# Patient Record
Sex: FEMALE | Race: WHITE | Employment: OTHER | URBAN - METROPOLITAN AREA
[De-identification: names, ages, dates, MRNs, and addresses within clinical notes are randomized per-mention and may not be internally consistent; named-entity substitution may affect disease eponyms.]

---

## 2017-01-01 ENCOUNTER — SNF VISIT (OUTPATIENT)
Dept: INTERNAL MEDICINE CLINIC | Facility: SKILLED NURSING FACILITY | Age: 82
End: 2017-01-01

## 2017-01-01 ENCOUNTER — HOSPITAL ENCOUNTER (OUTPATIENT)
Facility: HOSPITAL | Age: 82
Setting detail: OBSERVATION
Discharge: NURSING, REHAB OR LONG-TERM CARE | End: 2017-01-01
Attending: EMERGENCY MEDICINE | Admitting: INTERNAL MEDICINE
Payer: MEDICARE

## 2017-01-01 ENCOUNTER — APPOINTMENT (OUTPATIENT)
Dept: GENERAL RADIOLOGY | Facility: HOSPITAL | Age: 82
End: 2017-01-01
Attending: EMERGENCY MEDICINE
Payer: MEDICARE

## 2017-01-01 ENCOUNTER — OFFICE VISIT (OUTPATIENT)
Dept: CARDIOLOGY CLINIC | Facility: HOSPITAL | Age: 82
End: 2017-01-01
Attending: INTERNAL MEDICINE
Payer: MEDICARE

## 2017-01-01 ENCOUNTER — HOSPITAL ENCOUNTER (OUTPATIENT)
Facility: HOSPITAL | Age: 82
Setting detail: OBSERVATION
Discharge: HOME HEALTH | End: 2017-01-01
Attending: EMERGENCY MEDICINE | Admitting: INTERNAL MEDICINE
Payer: MEDICARE

## 2017-01-01 ENCOUNTER — HOSPITAL ENCOUNTER (EMERGENCY)
Facility: HOSPITAL | Age: 82
Discharge: HOME | End: 2017-01-01
Attending: EMERGENCY MEDICINE
Payer: MEDICARE

## 2017-01-01 ENCOUNTER — APPOINTMENT (OUTPATIENT)
Dept: GENERAL RADIOLOGY | Facility: HOSPITAL | Age: 82
End: 2017-01-01
Attending: ORTHOPAEDIC SURGERY
Payer: MEDICARE

## 2017-01-01 ENCOUNTER — APPOINTMENT (OUTPATIENT)
Dept: CT IMAGING | Facility: HOSPITAL | Age: 82
End: 2017-01-01
Attending: EMERGENCY MEDICINE
Payer: MEDICARE

## 2017-01-01 ENCOUNTER — LAB REQUISITION (OUTPATIENT)
Dept: LAB | Facility: HOSPITAL | Age: 82
End: 2017-01-01
Payer: MEDICARE

## 2017-01-01 VITALS
HEART RATE: 86 BPM | BODY MASS INDEX: 33 KG/M2 | DIASTOLIC BLOOD PRESSURE: 62 MMHG | SYSTOLIC BLOOD PRESSURE: 130 MMHG | OXYGEN SATURATION: 90 % | WEIGHT: 169 LBS

## 2017-01-01 VITALS
TEMPERATURE: 97 F | DIASTOLIC BLOOD PRESSURE: 80 MMHG | HEART RATE: 78 BPM | WEIGHT: 164 LBS | RESPIRATION RATE: 20 BRPM | OXYGEN SATURATION: 91 % | SYSTOLIC BLOOD PRESSURE: 119 MMHG | BODY MASS INDEX: 32 KG/M2

## 2017-01-01 VITALS
DIASTOLIC BLOOD PRESSURE: 77 MMHG | TEMPERATURE: 97 F | WEIGHT: 175 LBS | OXYGEN SATURATION: 94 % | BODY MASS INDEX: 34 KG/M2 | SYSTOLIC BLOOD PRESSURE: 155 MMHG | HEART RATE: 85 BPM | RESPIRATION RATE: 19 BRPM

## 2017-01-01 VITALS
HEIGHT: 60 IN | OXYGEN SATURATION: 93 % | RESPIRATION RATE: 20 BRPM | DIASTOLIC BLOOD PRESSURE: 62 MMHG | HEART RATE: 90 BPM | TEMPERATURE: 98 F | BODY MASS INDEX: 32 KG/M2 | WEIGHT: 163 LBS | SYSTOLIC BLOOD PRESSURE: 155 MMHG

## 2017-01-01 VITALS
DIASTOLIC BLOOD PRESSURE: 42 MMHG | SYSTOLIC BLOOD PRESSURE: 122 MMHG | HEART RATE: 68 BPM | BODY MASS INDEX: 34 KG/M2 | WEIGHT: 175 LBS | OXYGEN SATURATION: 90 %

## 2017-01-01 VITALS
WEIGHT: 173 LBS | SYSTOLIC BLOOD PRESSURE: 141 MMHG | BODY MASS INDEX: 34 KG/M2 | HEART RATE: 78 BPM | DIASTOLIC BLOOD PRESSURE: 66 MMHG | OXYGEN SATURATION: 89 %

## 2017-01-01 VITALS
SYSTOLIC BLOOD PRESSURE: 145 MMHG | OXYGEN SATURATION: 94 % | HEART RATE: 70 BPM | BODY MASS INDEX: 33 KG/M2 | WEIGHT: 170.5 LBS | DIASTOLIC BLOOD PRESSURE: 73 MMHG

## 2017-01-01 VITALS
WEIGHT: 172.31 LBS | RESPIRATION RATE: 18 BRPM | DIASTOLIC BLOOD PRESSURE: 71 MMHG | OXYGEN SATURATION: 91 % | SYSTOLIC BLOOD PRESSURE: 133 MMHG | BODY MASS INDEX: 34 KG/M2 | HEART RATE: 77 BPM

## 2017-01-01 VITALS
HEART RATE: 76 BPM | DIASTOLIC BLOOD PRESSURE: 72 MMHG | BODY MASS INDEX: 34 KG/M2 | WEIGHT: 176 LBS | OXYGEN SATURATION: 91 % | SYSTOLIC BLOOD PRESSURE: 149 MMHG

## 2017-01-01 VITALS
RESPIRATION RATE: 20 BRPM | OXYGEN SATURATION: 95 % | HEART RATE: 83 BPM | SYSTOLIC BLOOD PRESSURE: 124 MMHG | HEIGHT: 60 IN | TEMPERATURE: 98 F | DIASTOLIC BLOOD PRESSURE: 50 MMHG | BODY MASS INDEX: 31.74 KG/M2 | WEIGHT: 161.69 LBS

## 2017-01-01 VITALS
BODY MASS INDEX: 34 KG/M2 | DIASTOLIC BLOOD PRESSURE: 60 MMHG | SYSTOLIC BLOOD PRESSURE: 154 MMHG | OXYGEN SATURATION: 91 % | WEIGHT: 176 LBS | HEART RATE: 79 BPM

## 2017-01-01 DIAGNOSIS — I50.9 CHF (CONGESTIVE HEART FAILURE) (HCC): ICD-10-CM

## 2017-01-01 DIAGNOSIS — I50.33 ACUTE ON CHRONIC DIASTOLIC CONGESTIVE HEART FAILURE (HCC): Primary | Chronic | ICD-10-CM

## 2017-01-01 DIAGNOSIS — E87.70 HYPERVOLEMIA, UNSPECIFIED HYPERVOLEMIA TYPE: Primary | ICD-10-CM

## 2017-01-01 DIAGNOSIS — I50.9 HEART FAILURE (HCC): ICD-10-CM

## 2017-01-01 DIAGNOSIS — I25.10 ATHEROSCLEROTIC HEART DISEASE OF NATIVE CORONARY ARTERY WITHOUT ANGINA PECTORIS: ICD-10-CM

## 2017-01-01 DIAGNOSIS — I48.19 ATRIAL FIBRILLATION, PERSISTENT (HCC): ICD-10-CM

## 2017-01-01 DIAGNOSIS — R60.9 PERIPHERAL EDEMA: ICD-10-CM

## 2017-01-01 DIAGNOSIS — M43.6 TORTICOLLIS: ICD-10-CM

## 2017-01-01 DIAGNOSIS — M43.6 TORTICOLLIS: Primary | ICD-10-CM

## 2017-01-01 DIAGNOSIS — I50.9 HEART FAILURE, UNSPECIFIED (HCC): ICD-10-CM

## 2017-01-01 DIAGNOSIS — Z91.81 RISK FOR FALLS: Primary | ICD-10-CM

## 2017-01-01 DIAGNOSIS — I50.32 CHRONIC DIASTOLIC CHF (CONGESTIVE HEART FAILURE) (HCC): Primary | ICD-10-CM

## 2017-01-01 DIAGNOSIS — I50.33 ACUTE ON CHRONIC DIASTOLIC CONGESTIVE HEART FAILURE (HCC): Chronic | ICD-10-CM

## 2017-01-01 DIAGNOSIS — D64.9 ANEMIA, UNSPECIFIED TYPE: ICD-10-CM

## 2017-01-01 DIAGNOSIS — T07.XXXA MULTIPLE CONTUSIONS: ICD-10-CM
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
## 2017-01-01 DIAGNOSIS — N30.00 ACUTE CYSTITIS WITHOUT HEMATURIA: Primary | ICD-10-CM

## 2017-01-01 LAB
ANION GAP SERPL CALC-SCNC: 10 MMOL/L (ref 0–18)
ANION GAP SERPL CALC-SCNC: 9 MMOL/L (ref 0–18)
ANION GAP SERPL CALC-SCNC: 9 MMOL/L (ref 0–18)
BASOPHILS # BLD: 0 K/UL (ref 0–0.2)
BASOPHILS # BLD: 0.1 K/UL (ref 0–0.2)
BASOPHILS NFR BLD: 0 %
BASOPHILS NFR BLD: 1 %
BNP SERPL-MCNC: 181 PG/ML (ref 0–100)
BUN SERPL-MCNC: 14 MG/DL (ref 8–20)
BUN SERPL-MCNC: 18 MG/DL (ref 8–20)
BUN SERPL-MCNC: 18 MG/DL (ref 8–20)
BUN/CREAT SERPL: 16.3 (ref 10–20)
BUN/CREAT SERPL: 17.1 (ref 10–20)
BUN/CREAT SERPL: 22.2 (ref 10–20)
CALCIUM SERPL-MCNC: 8.6 MG/DL (ref 8.5–10.5)
CALCIUM SERPL-MCNC: 9 MG/DL (ref 8.5–10.5)
CALCIUM SERPL-MCNC: 9.1 MG/DL (ref 8.5–10.5)
CHLORIDE SERPL-SCNC: 92 MMOL/L (ref 95–110)
CHLORIDE SERPL-SCNC: 94 MMOL/L (ref 95–110)
CHLORIDE SERPL-SCNC: 99 MMOL/L (ref 95–110)
CO2 SERPL-SCNC: 31 MMOL/L (ref 22–32)
CO2 SERPL-SCNC: 33 MMOL/L (ref 22–32)
CO2 SERPL-SCNC: 35 MMOL/L (ref 22–32)
CREAT SERPL-MCNC: 0.81 MG/DL (ref 0.5–1.5)
CREAT SERPL-MCNC: 0.86 MG/DL (ref 0.5–1.5)
CREAT SERPL-MCNC: 1.05 MG/DL (ref 0.5–1.5)
EOSINOPHIL # BLD: 0.1 K/UL (ref 0–0.7)
EOSINOPHIL # BLD: 0.2 K/UL (ref 0–0.7)
EOSINOPHIL NFR BLD: 1 %
EOSINOPHIL NFR BLD: 2 %
ERYTHROCYTE [DISTWIDTH] IN BLOOD BY AUTOMATED COUNT: 13.6 % (ref 11–15)
ERYTHROCYTE [DISTWIDTH] IN BLOOD BY AUTOMATED COUNT: 13.9 % (ref 11–15)
GLUCOSE SERPL-MCNC: 102 MG/DL (ref 70–99)
GLUCOSE SERPL-MCNC: 97 MG/DL (ref 70–99)
GLUCOSE SERPL-MCNC: 98 MG/DL (ref 70–99)
HCT VFR BLD AUTO: 39.1 % (ref 35–48)
HCT VFR BLD AUTO: 41.3 % (ref 35–48)
HGB BLD-MCNC: 13 G/DL (ref 12–16)
HGB BLD-MCNC: 13.8 G/DL (ref 12–16)
LYMPHOCYTES # BLD: 1.6 K/UL (ref 1–4)
LYMPHOCYTES # BLD: 1.6 K/UL (ref 1–4)
LYMPHOCYTES NFR BLD: 13 %
LYMPHOCYTES NFR BLD: 17 %
MAGNESIUM SERPL-MCNC: 2 MG/DL (ref 1.8–2.5)
MCH RBC QN AUTO: 31 PG (ref 27–32)
MCH RBC QN AUTO: 31.3 PG (ref 27–32)
MCHC RBC AUTO-ENTMCNC: 33.4 G/DL (ref 32–37)
MCHC RBC AUTO-ENTMCNC: 33.5 G/DL (ref 32–37)
MCV RBC AUTO: 92.4 FL (ref 80–100)
MCV RBC AUTO: 93.8 FL (ref 80–100)
MONOCYTES # BLD: 1.1 K/UL (ref 0–1)
MONOCYTES # BLD: 1.2 K/UL (ref 0–1)
MONOCYTES NFR BLD: 10 %
MONOCYTES NFR BLD: 12 %
NEUTROPHILS # BLD AUTO: 6.3 K/UL (ref 1.8–7.7)
NEUTROPHILS # BLD AUTO: 9.1 K/UL (ref 1.8–7.7)
NEUTROPHILS NFR BLD: 69 %
NEUTROPHILS NFR BLD: 76 %
OSMOLALITY UR CALC.SUM OF ELEC: 282 MOSM/KG (ref 275–295)
OSMOLALITY UR CALC.SUM OF ELEC: 288 MOSM/KG (ref 275–295)
OSMOLALITY UR CALC.SUM OF ELEC: 288 MOSM/KG (ref 275–295)
PLATELET # BLD AUTO: 191 K/UL (ref 140–400)
PLATELET # BLD AUTO: 206 K/UL (ref 140–400)
PMV BLD AUTO: 7.1 FL (ref 7.4–10.3)
PMV BLD AUTO: 7.3 FL (ref 7.4–10.3)
POTASSIUM SERPL-SCNC: 3.7 MMOL/L (ref 3.3–5.1)
POTASSIUM SERPL-SCNC: 3.7 MMOL/L (ref 3.3–5.1)
POTASSIUM SERPL-SCNC: 3.8 MMOL/L (ref 3.3–5.1)
POTASSIUM SERPL-SCNC: 3.9 MMOL/L (ref 3.3–5.1)
RBC # BLD AUTO: 4.17 M/UL (ref 3.7–5.4)
RBC # BLD AUTO: 4.47 M/UL (ref 3.7–5.4)
SODIUM SERPL-SCNC: 135 MMOL/L (ref 136–144)
SODIUM SERPL-SCNC: 138 MMOL/L (ref 136–144)
SODIUM SERPL-SCNC: 139 MMOL/L (ref 136–144)
TROPONIN I SERPL-MCNC: 0.03 NG/ML (ref ?–0.03)
WBC # BLD AUTO: 12.1 K/UL (ref 4–11)
WBC # BLD AUTO: 9.2 K/UL (ref 4–11)

## 2017-01-01 PROCEDURE — 80048 BASIC METABOLIC PNL TOTAL CA: CPT | Performed by: EMERGENCY MEDICINE

## 2017-01-01 PROCEDURE — 93005 ELECTROCARDIOGRAM TRACING: CPT

## 2017-01-01 PROCEDURE — 80048 BASIC METABOLIC PNL TOTAL CA: CPT | Performed by: NURSE PRACTITIONER

## 2017-01-01 PROCEDURE — 85025 COMPLETE CBC W/AUTO DIFF WBC: CPT | Performed by: EMERGENCY MEDICINE

## 2017-01-01 PROCEDURE — 72100 X-RAY EXAM L-S SPINE 2/3 VWS: CPT | Performed by: EMERGENCY MEDICINE

## 2017-01-01 PROCEDURE — 80061 LIPID PANEL: CPT | Performed by: INTERNAL MEDICINE

## 2017-01-01 PROCEDURE — 99285 EMERGENCY DEPT VISIT HI MDM: CPT

## 2017-01-01 PROCEDURE — 84132 ASSAY OF SERUM POTASSIUM: CPT | Performed by: INTERNAL MEDICINE

## 2017-01-01 PROCEDURE — 73030 X-RAY EXAM OF SHOULDER: CPT | Performed by: EMERGENCY MEDICINE

## 2017-01-01 PROCEDURE — 99211 OFF/OP EST MAY X REQ PHY/QHP: CPT | Performed by: NURSE PRACTITIONER

## 2017-01-01 PROCEDURE — 93010 ELECTROCARDIOGRAM REPORT: CPT | Performed by: EMERGENCY MEDICINE

## 2017-01-01 PROCEDURE — 96375 TX/PRO/DX INJ NEW DRUG ADDON: CPT

## 2017-01-01 PROCEDURE — 3E0233Z INTRODUCTION OF ANTI-INFLAMMATORY INTO MUSCLE, PERCUTANEOUS APPROACH: ICD-10-PCS | Performed by: ANESTHESIOLOGY

## 2017-01-01 PROCEDURE — 99214 OFFICE O/P EST MOD 30 MIN: CPT | Performed by: NURSE PRACTITIONER

## 2017-01-01 PROCEDURE — 97166 OT EVAL MOD COMPLEX 45 MIN: CPT

## 2017-01-01 PROCEDURE — 84460 ALANINE AMINO (ALT) (SGPT): CPT | Performed by: INTERNAL MEDICINE

## 2017-01-01 PROCEDURE — 36415 COLL VENOUS BLD VENIPUNCTURE: CPT | Performed by: NURSE PRACTITIONER

## 2017-01-01 PROCEDURE — 83735 ASSAY OF MAGNESIUM: CPT | Performed by: INTERNAL MEDICINE

## 2017-01-01 PROCEDURE — 87641 MR-STAPH DNA AMP PROBE: CPT | Performed by: EMERGENCY MEDICINE

## 2017-01-01 PROCEDURE — 80048 BASIC METABOLIC PNL TOTAL CA: CPT | Performed by: INTERNAL MEDICINE

## 2017-01-01 PROCEDURE — 97140 MANUAL THERAPY 1/> REGIONS: CPT

## 2017-01-01 PROCEDURE — 87086 URINE CULTURE/COLONY COUNT: CPT | Performed by: EMERGENCY MEDICINE

## 2017-01-01 PROCEDURE — 83880 ASSAY OF NATRIURETIC PEPTIDE: CPT | Performed by: NURSE PRACTITIONER

## 2017-01-01 PROCEDURE — 83735 ASSAY OF MAGNESIUM: CPT | Performed by: NURSE PRACTITIONER

## 2017-01-01 PROCEDURE — 3E023BZ INTRODUCTION OF ANESTHETIC AGENT INTO MUSCLE, PERCUTANEOUS APPROACH: ICD-10-PCS | Performed by: ANESTHESIOLOGY

## 2017-01-01 PROCEDURE — 81001 URINALYSIS AUTO W/SCOPE: CPT | Performed by: EMERGENCY MEDICINE

## 2017-01-01 PROCEDURE — 97110 THERAPEUTIC EXERCISES: CPT

## 2017-01-01 PROCEDURE — 97116 GAIT TRAINING THERAPY: CPT

## 2017-01-01 PROCEDURE — 36415 COLL VENOUS BLD VENIPUNCTURE: CPT

## 2017-01-01 PROCEDURE — 96374 THER/PROPH/DIAG INJ IV PUSH: CPT | Performed by: NURSE PRACTITIONER

## 2017-01-01 PROCEDURE — 97530 THERAPEUTIC ACTIVITIES: CPT

## 2017-01-01 PROCEDURE — 70450 CT HEAD/BRAIN W/O DYE: CPT | Performed by: EMERGENCY MEDICINE

## 2017-01-01 PROCEDURE — 97162 PT EVAL MOD COMPLEX 30 MIN: CPT

## 2017-01-01 PROCEDURE — 87077 CULTURE AEROBIC IDENTIFY: CPT | Performed by: EMERGENCY MEDICINE

## 2017-01-01 PROCEDURE — 96374 THER/PROPH/DIAG INJ IV PUSH: CPT

## 2017-01-01 PROCEDURE — 84450 TRANSFERASE (AST) (SGOT): CPT | Performed by: INTERNAL MEDICINE

## 2017-01-01 PROCEDURE — 72040 X-RAY EXAM NECK SPINE 2-3 VW: CPT | Performed by: ORTHOPAEDIC SURGERY

## 2017-01-01 PROCEDURE — 96365 THER/PROPH/DIAG IV INF INIT: CPT

## 2017-01-01 PROCEDURE — 84484 ASSAY OF TROPONIN QUANT: CPT | Performed by: EMERGENCY MEDICINE

## 2017-01-01 PROCEDURE — 20552 NJX 1/MLT TRIGGER POINT 1/2: CPT

## 2017-01-01 PROCEDURE — 73560 X-RAY EXAM OF KNEE 1 OR 2: CPT | Performed by: EMERGENCY MEDICINE

## 2017-01-01 PROCEDURE — 82272 OCCULT BLD FECES 1-3 TESTS: CPT

## 2017-01-01 PROCEDURE — 85025 COMPLETE CBC W/AUTO DIFF WBC: CPT | Performed by: INTERNAL MEDICINE

## 2017-01-01 PROCEDURE — 97161 PT EVAL LOW COMPLEX 20 MIN: CPT

## 2017-01-01 RX ORDER — ROPINIROLE 1 MG/1
1 TABLET, FILM COATED ORAL NIGHTLY
Status: DISCONTINUED | OUTPATIENT
Start: 2017-01-01 | End: 2017-01-01

## 2017-01-01 RX ORDER — MELATONIN
325
Status: DISCONTINUED | OUTPATIENT
Start: 2017-01-01 | End: 2017-01-01

## 2017-01-01 RX ORDER — SENNA AND DOCUSATE SODIUM 50; 8.6 MG/1; MG/1
2 TABLET, FILM COATED ORAL DAILY
Status: DISCONTINUED | OUTPATIENT
Start: 2017-01-01 | End: 2017-01-01

## 2017-01-01 RX ORDER — POTASSIUM CHLORIDE 20 MEQ/1
TABLET, EXTENDED RELEASE ORAL
Status: COMPLETED
Start: 2017-01-01 | End: 2017-01-01

## 2017-01-01 RX ORDER — LIDOCAINE 50 MG/G
1 PATCH TOPICAL DAILY
Qty: 15 PATCH | Refills: 0 | Status: ON HOLD | OUTPATIENT
Start: 2017-01-01 | End: 2018-01-01

## 2017-01-01 RX ORDER — PANTOPRAZOLE SODIUM 40 MG/1
40 TABLET, DELAYED RELEASE ORAL DAILY
Status: DISCONTINUED | OUTPATIENT
Start: 2017-01-01 | End: 2017-01-01

## 2017-01-01 RX ORDER — ASPIRIN 81 MG/1
81 TABLET ORAL DAILY
Status: DISCONTINUED | OUTPATIENT
Start: 2017-01-01 | End: 2017-01-01

## 2017-01-01 RX ORDER — LEVOTHYROXINE SODIUM 88 UG/1
88 TABLET ORAL
Status: DISCONTINUED | OUTPATIENT
Start: 2017-01-01 | End: 2017-01-01

## 2017-01-01 RX ORDER — FUROSEMIDE 10 MG/ML
INJECTION INTRAMUSCULAR; INTRAVENOUS
Status: COMPLETED
Start: 2017-01-01 | End: 2017-01-01

## 2017-01-01 RX ORDER — HYDROCODONE BITARTRATE AND ACETAMINOPHEN 5; 325 MG/1; MG/1
1 TABLET ORAL EVERY 8 HOURS PRN
Qty: 20 TABLET | Refills: 0 | Status: SHIPPED | OUTPATIENT
Start: 2017-01-01 | End: 2017-01-01

## 2017-01-01 RX ORDER — MAGNESIUM OXIDE 400 MG (241.3 MG MAGNESIUM) TABLET
400 TABLET 2 TIMES DAILY
Status: DISCONTINUED | OUTPATIENT
Start: 2017-01-01 | End: 2017-01-01

## 2017-01-01 RX ORDER — DIAZEPAM 2 MG/1
2 TABLET ORAL EVERY 8 HOURS PRN
Status: DISCONTINUED | OUTPATIENT
Start: 2017-01-01 | End: 2017-01-01

## 2017-01-01 RX ORDER — POTASSIUM CHLORIDE 20 MEQ/1
20 TABLET, EXTENDED RELEASE ORAL 2 TIMES DAILY
Status: DISCONTINUED | OUTPATIENT
Start: 2017-01-01 | End: 2017-01-01

## 2017-01-01 RX ORDER — GABAPENTIN 100 MG/1
100 CAPSULE ORAL NIGHTLY
Status: DISCONTINUED | OUTPATIENT
Start: 2017-01-01 | End: 2017-01-01

## 2017-01-01 RX ORDER — DIAZEPAM 2 MG/1
2 TABLET ORAL ONCE
Status: COMPLETED | OUTPATIENT
Start: 2017-01-01 | End: 2017-01-01

## 2017-01-01 RX ORDER — DIAZEPAM 5 MG/ML
2.5 INJECTION, SOLUTION INTRAMUSCULAR; INTRAVENOUS ONCE
Status: COMPLETED | OUTPATIENT
Start: 2017-01-01 | End: 2017-01-01

## 2017-01-01 RX ORDER — TORSEMIDE 20 MG/1
20 TABLET ORAL ONCE
Status: COMPLETED | OUTPATIENT
Start: 2017-01-01 | End: 2017-01-01

## 2017-01-01 RX ORDER — POLYETHYLENE GLYCOL 3350 17 G/17G
17 POWDER, FOR SOLUTION ORAL DAILY
Status: DISCONTINUED | OUTPATIENT
Start: 2017-01-01 | End: 2017-01-01

## 2017-01-01 RX ORDER — TORSEMIDE 20 MG/1
TABLET ORAL
Qty: 60 TABLET | Refills: 0 | Status: SHIPPED | OUTPATIENT
Start: 2017-01-01 | End: 2017-01-01

## 2017-01-01 RX ORDER — DILTIAZEM HYDROCHLORIDE 240 MG/1
240 CAPSULE, COATED, EXTENDED RELEASE ORAL DAILY
Status: DISCONTINUED | OUTPATIENT
Start: 2017-01-01 | End: 2017-01-01

## 2017-01-01 RX ORDER — POLYETHYLENE GLYCOL 3350 17 G/17G
17 POWDER, FOR SOLUTION ORAL DAILY PRN
Status: DISCONTINUED | OUTPATIENT
Start: 2017-01-01 | End: 2017-01-01

## 2017-01-01 RX ORDER — POTASSIUM CHLORIDE 750 MG/1
30 TABLET, FILM COATED, EXTENDED RELEASE ORAL 2 TIMES DAILY
Qty: 120 TABLET | Refills: 1 | Status: ON HOLD | OUTPATIENT
Start: 2017-01-01 | End: 2018-01-01 | Stop reason: DRUGHIGH

## 2017-01-01 RX ORDER — CETIRIZINE HYDROCHLORIDE 5 MG/1
5 TABLET ORAL DAILY PRN
Status: DISCONTINUED | OUTPATIENT
Start: 2017-01-01 | End: 2017-01-01

## 2017-01-01 RX ORDER — LORATADINE 10 MG/1
10 TABLET ORAL DAILY PRN
Status: DISCONTINUED | OUTPATIENT
Start: 2017-01-01 | End: 2017-01-01 | Stop reason: RX

## 2017-01-01 RX ORDER — LIDOCAINE 50 MG/G
1 PATCH TOPICAL EVERY 24 HOURS
Status: DISCONTINUED | OUTPATIENT
Start: 2017-01-01 | End: 2017-01-01

## 2017-01-01 RX ORDER — FUROSEMIDE 40 MG/1
40 TABLET ORAL 2 TIMES DAILY
Status: DISCONTINUED | OUTPATIENT
Start: 2017-01-01 | End: 2017-01-01

## 2017-01-01 RX ORDER — SENNA AND DOCUSATE SODIUM 50; 8.6 MG/1; MG/1
2 TABLET, FILM COATED ORAL NIGHTLY PRN
Status: DISCONTINUED | OUTPATIENT
Start: 2017-01-01 | End: 2017-01-01

## 2017-01-01 RX ORDER — POTASSIUM CHLORIDE 750 MG/1
TABLET, FILM COATED, EXTENDED RELEASE ORAL
Qty: 120 TABLET | Refills: 0 | Status: SHIPPED | OUTPATIENT
Start: 2017-01-01 | End: 2017-01-01

## 2017-01-01 RX ORDER — POTASSIUM CHLORIDE 20 MEQ/1
40 TABLET, EXTENDED RELEASE ORAL ONCE
Status: COMPLETED | OUTPATIENT
Start: 2017-01-01 | End: 2017-01-01

## 2017-01-01 RX ORDER — DIAZEPAM 2 MG/1
2 TABLET ORAL EVERY 8 HOURS PRN
Qty: 20 TABLET | Refills: 0 | Status: SHIPPED | OUTPATIENT
Start: 2017-01-01 | End: 2017-01-01

## 2017-01-01 RX ORDER — LIDOCAINE 50 MG/G
1 PATCH TOPICAL DAILY
Status: DISCONTINUED | OUTPATIENT
Start: 2017-01-01 | End: 2017-01-01

## 2017-01-01 RX ORDER — POTASSIUM CHLORIDE 20 MEQ/1
TABLET, EXTENDED RELEASE ORAL
Status: DISCONTINUED
Start: 2017-01-01 | End: 2017-01-01

## 2017-01-01 RX ORDER — BISACODYL 10 MG
10 SUPPOSITORY, RECTAL RECTAL
Status: DISCONTINUED | OUTPATIENT
Start: 2017-01-01 | End: 2017-01-01

## 2017-01-01 RX ORDER — HYDROMORPHONE HYDROCHLORIDE 1 MG/ML
0.2 INJECTION, SOLUTION INTRAMUSCULAR; INTRAVENOUS; SUBCUTANEOUS 4 TIMES DAILY PRN
Status: DISCONTINUED | OUTPATIENT
Start: 2017-01-01 | End: 2017-01-01

## 2017-01-01 RX ORDER — PANTOPRAZOLE SODIUM 40 MG/1
40 TABLET, DELAYED RELEASE ORAL
Status: DISCONTINUED | OUTPATIENT
Start: 2017-01-01 | End: 2017-01-01

## 2017-01-01 RX ORDER — FUROSEMIDE 40 MG/1
40 TABLET ORAL
Status: DISCONTINUED | OUTPATIENT
Start: 2017-01-01 | End: 2017-01-01

## 2017-01-01 RX ORDER — POTASSIUM CHLORIDE 750 MG/1
TABLET, FILM COATED, EXTENDED RELEASE ORAL
Qty: 120 TABLET | Refills: 3 | OUTPATIENT
Start: 2017-01-01

## 2017-01-01 RX ORDER — TORSEMIDE 20 MG/1
TABLET ORAL
Qty: 60 TABLET | Refills: 1 | Status: SHIPPED | OUTPATIENT
Start: 2017-01-01 | End: 2017-01-01 | Stop reason: DRUGHIGH

## 2017-01-01 RX ORDER — CEPHALEXIN 250 MG/1
250 CAPSULE ORAL 3 TIMES DAILY
Qty: 21 CAPSULE | Refills: 0 | Status: SHIPPED | OUTPATIENT
Start: 2017-01-01 | End: 2017-01-01

## 2017-01-01 RX ORDER — POTASSIUM CHLORIDE 750 MG/1
20 TABLET, EXTENDED RELEASE ORAL 2 TIMES DAILY
Qty: 120 TABLET | Refills: 0 | Status: SHIPPED | OUTPATIENT
Start: 2017-01-01 | End: 2017-01-01

## 2017-01-01 RX ORDER — ACETAMINOPHEN 325 MG/1
650 TABLET ORAL EVERY 6 HOURS PRN
Status: DISCONTINUED | OUTPATIENT
Start: 2017-01-01 | End: 2017-01-01

## 2017-01-01 RX ORDER — POTASSIUM CHLORIDE 750 MG/1
TABLET, FILM COATED, EXTENDED RELEASE ORAL
Qty: 120 TABLET | Refills: 1 | Status: SHIPPED | OUTPATIENT
Start: 2017-01-01 | End: 2017-01-01

## 2017-01-01 RX ORDER — NYSTATIN 100000 U/G
CREAM TOPICAL 2 TIMES DAILY
Status: DISCONTINUED | OUTPATIENT
Start: 2017-01-01 | End: 2017-01-01

## 2017-01-01 RX ORDER — CETIRIZINE HYDROCHLORIDE 10 MG/1
10 TABLET ORAL
Status: DISCONTINUED | OUTPATIENT
Start: 2017-01-01 | End: 2017-01-01

## 2017-01-01 RX ORDER — TRAMADOL HYDROCHLORIDE 50 MG/1
50 TABLET ORAL EVERY 6 HOURS PRN
Status: DISCONTINUED | OUTPATIENT
Start: 2017-01-01 | End: 2017-01-01

## 2017-01-01 RX ORDER — FUROSEMIDE 40 MG/1
TABLET ORAL
Qty: 60 TABLET | Refills: 2 | OUTPATIENT
Start: 2017-01-01

## 2017-01-01 RX ORDER — TORSEMIDE 20 MG/1
40 TABLET ORAL
Qty: 120 TABLET | Refills: 0 | Status: ON HOLD | OUTPATIENT
Start: 2017-01-01 | End: 2018-01-01

## 2017-01-01 RX ORDER — PRAVASTATIN SODIUM 20 MG
20 TABLET ORAL NIGHTLY
Status: DISCONTINUED | OUTPATIENT
Start: 2017-01-01 | End: 2017-01-01

## 2017-01-01 RX ORDER — HYDROCODONE BITARTRATE AND ACETAMINOPHEN 5; 325 MG/1; MG/1
1 TABLET ORAL EVERY 6 HOURS PRN
Status: DISCONTINUED | OUTPATIENT
Start: 2017-01-01 | End: 2017-01-01

## 2017-01-01 RX ORDER — TORSEMIDE 20 MG/1
20 TABLET ORAL
Qty: 60 TABLET | Refills: 0 | Status: SHIPPED | OUTPATIENT
Start: 2017-01-01 | End: 2017-01-01

## 2017-01-01 RX ORDER — CYCLOBENZAPRINE HCL 10 MG
5 TABLET ORAL 3 TIMES DAILY PRN
Status: DISCONTINUED | OUTPATIENT
Start: 2017-01-01 | End: 2017-01-01

## 2017-01-01 RX ADMIN — FUROSEMIDE 40 MG: 10 INJECTION INTRAMUSCULAR; INTRAVENOUS at 13:30:00

## 2017-01-01 RX ADMIN — POTASSIUM CHLORIDE 20 MEQ: 20 TABLET, EXTENDED RELEASE ORAL at 11:45:00

## 2017-01-01 RX ADMIN — FUROSEMIDE 40 MG: 10 INJECTION INTRAMUSCULAR; INTRAVENOUS at 13:25:00

## 2017-01-01 RX ADMIN — POTASSIUM CHLORIDE 20 MEQ: 20 TABLET, EXTENDED RELEASE ORAL at 13:40:00

## 2017-01-01 RX ADMIN — FUROSEMIDE 40 MG: 10 INJECTION INTRAMUSCULAR; INTRAVENOUS at 11:45:00

## 2017-01-01 RX ADMIN — POTASSIUM CHLORIDE 20 MEQ: 20 TABLET, EXTENDED RELEASE ORAL at 13:30:00

## 2017-01-01 RX ADMIN — POTASSIUM CHLORIDE 20 MEQ: 20 TABLET, EXTENDED RELEASE ORAL at 13:35:00

## 2017-01-01 RX ADMIN — FUROSEMIDE 40 MG: 10 INJECTION INTRAMUSCULAR; INTRAVENOUS at 13:49:00

## 2017-01-01 RX ADMIN — FUROSEMIDE 40 MG: 10 INJECTION INTRAMUSCULAR; INTRAVENOUS at 16:03:00

## 2017-01-04 ENCOUNTER — PRIOR ORIGINAL RECORDS (OUTPATIENT)
Dept: OTHER | Age: 82
End: 2017-01-04

## 2017-01-04 ENCOUNTER — OFFICE VISIT (OUTPATIENT)
Dept: CARDIOLOGY CLINIC | Facility: HOSPITAL | Age: 82
End: 2017-01-04
Attending: INTERNAL MEDICINE

## 2017-01-04 VITALS
RESPIRATION RATE: 18 BRPM | OXYGEN SATURATION: 91 % | BODY MASS INDEX: 31 KG/M2 | WEIGHT: 157 LBS | SYSTOLIC BLOOD PRESSURE: 126 MMHG | HEART RATE: 70 BPM | DIASTOLIC BLOOD PRESSURE: 52 MMHG

## 2017-01-04 DIAGNOSIS — I50.9 CONGESTIVE HEART FAILURE, UNSPECIFIED: Primary | ICD-10-CM

## 2017-01-04 LAB
ANION GAP SERPL CALC-SCNC: 10 MMOL/L (ref 0–18)
BUN SERPL-MCNC: 25 MG/DL (ref 8–20)
BUN/CREAT SERPL: 26.9 (ref 10–20)
CALCIUM SERPL-MCNC: 9.1 MG/DL (ref 8.5–10.5)
CHLORIDE SERPL-SCNC: 98 MMOL/L (ref 95–110)
CO2 SERPL-SCNC: 29 MMOL/L (ref 22–32)
CREAT SERPL-MCNC: 0.93 MG/DL (ref 0.5–1.5)
GLUCOSE SERPL-MCNC: 108 MG/DL (ref 70–99)
OSMOLALITY UR CALC.SUM OF ELEC: 289 MOSM/KG (ref 275–295)
POTASSIUM SERPL-SCNC: 3.7 MMOL/L (ref 3.3–5.1)
SODIUM SERPL-SCNC: 137 MMOL/L (ref 136–144)

## 2017-01-04 PROCEDURE — 80048 BASIC METABOLIC PNL TOTAL CA: CPT | Performed by: NURSE PRACTITIONER

## 2017-01-04 PROCEDURE — 99213 OFFICE O/P EST LOW 20 MIN: CPT | Performed by: NURSE PRACTITIONER

## 2017-01-04 PROCEDURE — 99211 OFF/OP EST MAY X REQ PHY/QHP: CPT | Performed by: NURSE PRACTITIONER

## 2017-01-04 PROCEDURE — 36415 COLL VENOUS BLD VENIPUNCTURE: CPT | Performed by: NURSE PRACTITIONER

## 2017-01-04 RX ORDER — FUROSEMIDE 40 MG/1
40 TABLET ORAL 2 TIMES DAILY
COMMUNITY
End: 2017-01-01 | Stop reason: ALTCHOICE

## 2017-01-04 RX ORDER — POTASSIUM CHLORIDE 750 MG/1
20 TABLET, EXTENDED RELEASE ORAL 2 TIMES DAILY
COMMUNITY
End: 2017-01-01

## 2017-01-04 NOTE — PROGRESS NOTES
Heart Failure [facility] Patient Status:  Outpatient    3/1/1925 MRN [id number]   Location MD Hill Frod MD        80 y.o. female with PMH of CHF, A.fib, Hypothyroidi ([address]     HTN (hypertension)        Subjective  A few nights ago the patient slide off the bed at home. She hit her back when this happened. She called 911 to have her get off the edge of the bed. Refused going to hospital at that time.  2 of last 4 nights symmetrical, trachea midline  Lungs: + bibasilar rales  Chest wall: no tenderness  Heart: S1, S2 normal, irregularly irregular rhythm  Abdomen: soft, non-tender; bowel sounds normal; no masses,  no organomegaly  Extremities:edema 1-2+ extending just below soy sauce, pre-packaged rice or potatoes.  Please remember to read nutrition labels for sodium content.     -Keep fluids at 32 oz minimum and 64 oz maximum daily.     -Please call with more shortness of breath, more swelling in your feet, legs, ankles, or a

## 2017-01-04 NOTE — PATIENT INSTRUCTIONS
Please obtain comfortable compression stockings. Please wear your compression stockings throughout the day. You may take off at bedtime. Continue all medications.      Please see Dr Lucila Medina on 2/8/17 as scheduled    Things for You to Remember:     -Tono

## 2017-02-07 ENCOUNTER — PRIOR ORIGINAL RECORDS (OUTPATIENT)
Dept: OTHER | Age: 82
End: 2017-02-07

## 2017-02-07 LAB
BUN: 25 MG/DL
CREATININE, SERUM: 0.93 MG/DL
GLUCOSE: 108 MG/DL
POTASSIUM, SERUM: 3.7 MEQ/L
SODIUM: 137 MEQ/L

## 2017-02-08 ENCOUNTER — PRIOR ORIGINAL RECORDS (OUTPATIENT)
Dept: OTHER | Age: 82
End: 2017-02-08

## 2017-03-14 ENCOUNTER — APPOINTMENT (OUTPATIENT)
Dept: GENERAL RADIOLOGY | Facility: HOSPITAL | Age: 82
DRG: 605 | End: 2017-03-14
Attending: EMERGENCY MEDICINE
Payer: MEDICARE

## 2017-03-14 ENCOUNTER — HOSPITAL ENCOUNTER (INPATIENT)
Facility: HOSPITAL | Age: 82
LOS: 3 days | Discharge: NURSING, REHAB OR LONG-TERM CARE | DRG: 605 | End: 2017-03-17
Attending: EMERGENCY MEDICINE | Admitting: INTERNAL MEDICINE
Payer: MEDICARE

## 2017-03-14 DIAGNOSIS — M25.561 ACUTE PAIN OF BOTH KNEES: ICD-10-CM

## 2017-03-14 DIAGNOSIS — M25.562 ACUTE PAIN OF BOTH KNEES: ICD-10-CM

## 2017-03-14 DIAGNOSIS — R60.9 PERIPHERAL EDEMA: ICD-10-CM

## 2017-03-14 DIAGNOSIS — R26.2 INABILITY TO AMBULATE DUE TO KNEE: ICD-10-CM
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
## 2017-03-14 PROBLEM — E03.9 HYPOTHYROIDISM: Chronic | Status: ACTIVE | Noted: 2017-03-14

## 2017-03-14 PROBLEM — I50.32 DIASTOLIC CHF, CHRONIC (HCC): Chronic | Status: ACTIVE | Noted: 2017-03-14

## 2017-03-14 LAB
ANION GAP SERPL CALC-SCNC: 11 MMOL/L (ref 0–18)
BASOPHILS # BLD: 0 K/UL (ref 0–0.2)
BASOPHILS NFR BLD: 0 %
BILIRUB UR QL: NEGATIVE
BNP SERPL-MCNC: 158 PG/ML (ref 0–100)
BUN SERPL-MCNC: 25 MG/DL (ref 8–20)
BUN/CREAT SERPL: 18.7 (ref 10–20)
CALCIUM SERPL-MCNC: 9.6 MG/DL (ref 8.5–10.5)
CHLORIDE SERPL-SCNC: 99 MMOL/L (ref 95–110)
CLARITY UR: CLEAR
CO2 SERPL-SCNC: 28 MMOL/L (ref 22–32)
COLOR UR: YELLOW
CREAT SERPL-MCNC: 1.34 MG/DL (ref 0.5–1.5)
EOSINOPHIL # BLD: 0 K/UL (ref 0–0.7)
EOSINOPHIL NFR BLD: 1 %
ERYTHROCYTE [DISTWIDTH] IN BLOOD BY AUTOMATED COUNT: 14.6 % (ref 11–15)
GLUCOSE SERPL-MCNC: 155 MG/DL (ref 70–99)
GLUCOSE UR-MCNC: NEGATIVE MG/DL
HCT VFR BLD AUTO: 41.4 % (ref 35–48)
HGB BLD-MCNC: 13.6 G/DL (ref 12–16)
HYALINE CASTS #/AREA URNS AUTO: 5 /LPF
KETONES UR-MCNC: NEGATIVE MG/DL
LYMPHOCYTES # BLD: 0.8 K/UL (ref 1–4)
LYMPHOCYTES NFR BLD: 8 %
MAGNESIUM SERPL-MCNC: 2.2 MG/DL (ref 1.8–2.5)
MCH RBC QN AUTO: 29.2 PG (ref 27–32)
MCHC RBC AUTO-ENTMCNC: 32.8 G/DL (ref 32–37)
MCV RBC AUTO: 89 FL (ref 80–100)
MONOCYTES # BLD: 0.7 K/UL (ref 0–1)
MONOCYTES NFR BLD: 7 %
NEUTROPHILS # BLD AUTO: 8.2 K/UL (ref 1.8–7.7)
NEUTROPHILS NFR BLD: 84 %
NITRITE UR QL STRIP.AUTO: NEGATIVE
OSMOLALITY UR CALC.SUM OF ELEC: 294 MOSM/KG (ref 275–295)
PH UR: 6 [PH] (ref 5–8)
PLATELET # BLD AUTO: 276 K/UL (ref 140–400)
PMV BLD AUTO: 6.4 FL (ref 7.4–10.3)
POTASSIUM SERPL-SCNC: 4.4 MMOL/L (ref 3.3–5.1)
PROT UR-MCNC: NEGATIVE MG/DL
RBC # BLD AUTO: 4.65 M/UL (ref 3.7–5.4)
RBC #/AREA URNS AUTO: 1 /HPF
SODIUM SERPL-SCNC: 138 MMOL/L (ref 136–144)
SP GR UR STRIP: 1.01 (ref 1–1.03)
TROPONIN I SERPL-MCNC: 0.03 NG/ML (ref ?–0.03)
UROBILINOGEN UR STRIP-ACNC: <2
VIT C UR-MCNC: NEGATIVE MG/DL
WBC # BLD AUTO: 9.7 K/UL (ref 4–11)
WBC #/AREA URNS AUTO: 16 /HPF

## 2017-03-14 PROCEDURE — 72170 X-RAY EXAM OF PELVIS: CPT

## 2017-03-14 PROCEDURE — 81001 URINALYSIS AUTO W/SCOPE: CPT | Performed by: EMERGENCY MEDICINE

## 2017-03-14 PROCEDURE — 83880 ASSAY OF NATRIURETIC PEPTIDE: CPT | Performed by: EMERGENCY MEDICINE

## 2017-03-14 PROCEDURE — 96375 TX/PRO/DX INJ NEW DRUG ADDON: CPT

## 2017-03-14 PROCEDURE — 85025 COMPLETE CBC W/AUTO DIFF WBC: CPT | Performed by: EMERGENCY MEDICINE

## 2017-03-14 PROCEDURE — 97161 PT EVAL LOW COMPLEX 20 MIN: CPT

## 2017-03-14 PROCEDURE — 93005 ELECTROCARDIOGRAM TRACING: CPT

## 2017-03-14 PROCEDURE — 71010 XR CHEST AP PORTABLE  (CPT=71010): CPT

## 2017-03-14 PROCEDURE — 80048 BASIC METABOLIC PNL TOTAL CA: CPT | Performed by: EMERGENCY MEDICINE

## 2017-03-14 PROCEDURE — 99285 EMERGENCY DEPT VISIT HI MDM: CPT

## 2017-03-14 PROCEDURE — 84484 ASSAY OF TROPONIN QUANT: CPT | Performed by: EMERGENCY MEDICINE

## 2017-03-14 PROCEDURE — 93010 ELECTROCARDIOGRAM REPORT: CPT | Performed by: EMERGENCY MEDICINE

## 2017-03-14 PROCEDURE — 83735 ASSAY OF MAGNESIUM: CPT | Performed by: EMERGENCY MEDICINE

## 2017-03-14 PROCEDURE — 87086 URINE CULTURE/COLONY COUNT: CPT | Performed by: EMERGENCY MEDICINE

## 2017-03-14 PROCEDURE — 96374 THER/PROPH/DIAG INJ IV PUSH: CPT

## 2017-03-14 PROCEDURE — 97166 OT EVAL MOD COMPLEX 45 MIN: CPT

## 2017-03-14 PROCEDURE — 73560 X-RAY EXAM OF KNEE 1 OR 2: CPT

## 2017-03-14 RX ORDER — HYDROMORPHONE HYDROCHLORIDE 1 MG/ML
0.3 INJECTION, SOLUTION INTRAMUSCULAR; INTRAVENOUS; SUBCUTANEOUS EVERY 6 HOURS PRN
Status: DISCONTINUED | OUTPATIENT
Start: 2017-03-14 | End: 2017-03-17

## 2017-03-14 RX ORDER — POTASSIUM CHLORIDE 20 MEQ/1
20 TABLET, EXTENDED RELEASE ORAL 2 TIMES DAILY
Status: DISCONTINUED | OUTPATIENT
Start: 2017-03-14 | End: 2017-03-17

## 2017-03-14 RX ORDER — FUROSEMIDE 10 MG/ML
40 INJECTION INTRAMUSCULAR; INTRAVENOUS
Status: DISCONTINUED | OUTPATIENT
Start: 2017-03-14 | End: 2017-03-17

## 2017-03-14 RX ORDER — FUROSEMIDE 10 MG/ML
40 INJECTION INTRAMUSCULAR; INTRAVENOUS ONCE
Status: COMPLETED | OUTPATIENT
Start: 2017-03-14 | End: 2017-03-14

## 2017-03-14 RX ORDER — MAGNESIUM OXIDE 400 MG (241.3 MG MAGNESIUM) TABLET
400 TABLET 2 TIMES DAILY
Status: DISCONTINUED | OUTPATIENT
Start: 2017-03-14 | End: 2017-03-17

## 2017-03-14 RX ORDER — MELATONIN
325
Status: DISCONTINUED | OUTPATIENT
Start: 2017-03-14 | End: 2017-03-17

## 2017-03-14 RX ORDER — CETIRIZINE HYDROCHLORIDE 5 MG/1
5 TABLET ORAL DAILY PRN
Status: DISCONTINUED | OUTPATIENT
Start: 2017-03-14 | End: 2017-03-17

## 2017-03-14 RX ORDER — LEVOTHYROXINE SODIUM 88 UG/1
88 TABLET ORAL
Status: DISCONTINUED | OUTPATIENT
Start: 2017-03-14 | End: 2017-03-17

## 2017-03-14 RX ORDER — HYDROCODONE BITARTRATE AND ACETAMINOPHEN 5; 325 MG/1; MG/1
1 TABLET ORAL EVERY 6 HOURS PRN
Status: DISCONTINUED | OUTPATIENT
Start: 2017-03-14 | End: 2017-03-17

## 2017-03-14 RX ORDER — ACETAMINOPHEN 325 MG/1
650 TABLET ORAL EVERY 6 HOURS PRN
Status: DISCONTINUED | OUTPATIENT
Start: 2017-03-14 | End: 2017-03-17

## 2017-03-14 RX ORDER — PANTOPRAZOLE SODIUM 40 MG/1
40 TABLET, DELAYED RELEASE ORAL DAILY
Status: DISCONTINUED | OUTPATIENT
Start: 2017-03-14 | End: 2017-03-17

## 2017-03-14 RX ORDER — POLYETHYLENE GLYCOL 3350 17 G/17G
17 POWDER, FOR SOLUTION ORAL DAILY
Status: DISCONTINUED | OUTPATIENT
Start: 2017-03-14 | End: 2017-03-17

## 2017-03-14 RX ORDER — MORPHINE SULFATE 2 MG/ML
2 INJECTION, SOLUTION INTRAMUSCULAR; INTRAVENOUS ONCE
Status: COMPLETED | OUTPATIENT
Start: 2017-03-14 | End: 2017-03-14

## 2017-03-14 RX ORDER — ROPINIROLE 1 MG/1
1 TABLET, FILM COATED ORAL NIGHTLY
Status: DISCONTINUED | OUTPATIENT
Start: 2017-03-14 | End: 2017-03-17

## 2017-03-14 RX ORDER — DILTIAZEM HYDROCHLORIDE 240 MG/1
240 CAPSULE, COATED, EXTENDED RELEASE ORAL DAILY
Status: DISCONTINUED | OUTPATIENT
Start: 2017-03-14 | End: 2017-03-17

## 2017-03-14 RX ORDER — TRIAMCINOLONE ACETONIDE 40 MG/ML
80 INJECTION, SUSPENSION INTRA-ARTICULAR; INTRAMUSCULAR ONCE
Status: DISCONTINUED | OUTPATIENT
Start: 2017-03-14 | End: 2017-03-17

## 2017-03-14 RX ORDER — SENNA AND DOCUSATE SODIUM 50; 8.6 MG/1; MG/1
2 TABLET, FILM COATED ORAL NIGHTLY PRN
Status: DISCONTINUED | OUTPATIENT
Start: 2017-03-14 | End: 2017-03-17

## 2017-03-14 RX ORDER — NYSTATIN 100000 U/G
CREAM TOPICAL 2 TIMES DAILY
Status: DISCONTINUED | OUTPATIENT
Start: 2017-03-14 | End: 2017-03-17

## 2017-03-14 RX ORDER — ASPIRIN 81 MG/1
81 TABLET ORAL DAILY
Status: DISCONTINUED | OUTPATIENT
Start: 2017-03-14 | End: 2017-03-17

## 2017-03-14 RX ORDER — BISACODYL 10 MG
10 SUPPOSITORY, RECTAL RECTAL
Status: DISCONTINUED | OUTPATIENT
Start: 2017-03-14 | End: 2017-03-17

## 2017-03-14 RX ORDER — GABAPENTIN 100 MG/1
100 CAPSULE ORAL NIGHTLY
Status: DISCONTINUED | OUTPATIENT
Start: 2017-03-14 | End: 2017-03-17

## 2017-03-14 RX ORDER — PRAVASTATIN SODIUM 20 MG
20 TABLET ORAL NIGHTLY
Status: DISCONTINUED | OUTPATIENT
Start: 2017-03-14 | End: 2017-03-17

## 2017-03-14 NOTE — H&P
[facility] Patient Status:  Inpatient    3/1/1925 MRN [id number]   Location [facility] 4W/SW/SE Attending Chung Gallo MD   Hosp Day # 0 PCP Eliana Chin MD     Date:  3/14/2017  Date of A times daily. Disp:  Rfl:  Taking   Potassium Chloride ER 10 MEQ Oral Tab CR Take 20 mEq by mouth 2 (two) times daily.  Disp:  Rfl:  Taking   Levothyroxine Sodium 88 MCG Oral Tab Take 88 mcg by mouth before breakfast. Disp:  Rfl:  Taking   apixaban 2.5 MG Or of breath and wheezing. Cardiovascular: Positive for palpitations and leg swelling. Negative for chest pain. Gastrointestinal: Positive for constipation. Negative for nausea, vomiting and abdominal pain.    Genitourinary: Negative for hematuria and dif [facility])  Will ask Cardiology to see. Hypothyroidism  Stable on Levothyroxine.           Tatyana Madera MD  3/14/2017  7:56 AM

## 2017-03-14 NOTE — PROGRESS NOTES
80year old  Admitted for evaluation of Accidental fall, initial encounter . Condition is stable .  For details see H & P.

## 2017-03-14 NOTE — CONSULTS
[facility] NOTE  Cardiology Consultation    [facility] Patient Status:  Inpatient    3/1/1925 MRN [id number]   Location [facility] 4W/SW/SE Attending Twyla Mendoza MD   Hosp Day # 0 PCP Laine Schneider MD     [facility] for [facility] SERVICES, [facility] persistent ([facility] [address]     HTN (hypertension)     Accidental fall     Accidental fall, initial encounter     Acute pain of both knees     Peripheral edema     Inability to ambulate due to knee     Diastolic CHF, chronic (HCC)     Hypothyroidism          Assessme 40 mg by mouth 2 (two) times daily. Disp:  Rfl:  Taking   Potassium Chloride ER 10 MEQ Oral Tab CR Take 20 mEq by mouth 2 (two) times daily.  Disp:  Rfl:  Taking   Levothyroxine Sodium 88 MCG Oral Tab Take 88 mcg by mouth before breakfast. Disp:  Rfl:  Francisco Chandler (VITAMIN D) 1000 units tab 1,000 Units, 1,000 Units, Oral, Daily  •  DilTIAZem HCl ER Coated Beads (CARDIZEM CD) 24 hr cap 240 mg, 240 mg, Oral, Daily  •  ferrous sulfate EC tab 325 mg, 325 mg, Oral, Daily with breakfast  •  Levothyroxine Sodium (SYNTHROID Exam:  Physical Exam:  The patient appears alert and comfortable in no acute distress  Head; atraumatic normocephalic  Conjunctiva; not injected nonicteric  Neck; no thyroidomegaly, jugular venous pulses normal carotid upstrokes are normal no bruits  Lungs

## 2017-03-14 NOTE — OCCUPATIONAL THERAPY NOTE
OCCUPATIONAL THERAPY EVALUATION - INPATIENT     Room Number: 417/417-A  Evaluation Date: 3/14/2017  Type of Evaluation: Initial  Presenting Problem:  (sp fall with HAYDEE knee contusions)    Physician Order: IP Consult to Occupational Therapy  Reason for Ther Glaucoma    • Macular degeneration    • Hypothyroidism    • Peripheral neuropathy ([address]    • Diverticulitis        Past Surgical History      Past Surgical History    TOTAL HIP REPLACEMENT      Comment Bilateral    REMOVAL GALLBLADDER      HYSTERECTOMY None    AM-PAC Score:  Score: 16  Approx Degree of Impairment: 53.32%  Standardized Score (AM-PAC Scale): 35.96  CMS Modifier (G-Code): CK    FUNCTIONAL TRANSFER ASSESSMENT  Supine to Sit : Maximum assistance  Sit to Stand: Minimum assistance    Toilet Tra

## 2017-03-14 NOTE — ED PROVIDER NOTES
Patient Seen in: [facility] AND [facility] Emergency Department    History   Patient presents with:  Fall    Stated Complaint: Mechanical fall     HPI    81 yo F with PMH HTN, HL, afib on eliquis, CAD, CHF presenting for evaluation after inadvertently falling forw Sulfate 325 (65 FE) MG Oral Tab,  Take 325 mg by mouth daily with breakfast.     Pantoprazole Sodium (PROTONIX) 40 MG Oral Tab EC,  Take 1 tablet by mouth daily. Pravastatin Sodium (PRAVACHOL) 20 MG Oral Tab,  Take 20 mg by mouth nightly.      Cholecalcif MM.  Head: Normocephalic. Atraumatic. Neck: Neck supple. No midline c-spine tenderness/stepoff/deformtiy. Cardiovascular: Irregularly irregular. Pulmonary/Chest: Effort normal. Bibasilar crackles. Abdominal: Soft. Nontender.   Musculoskeletal: No gross artficat            Bilateral knee radiographs   AP pelvic radiograph   AP portable chest radiograph  4 AM      IMPRESSION:  Chest:  Minimal perihilar bronchial wall cuffing bilaterally  Minimal hazy left retrocardiac opacification, likely atelectasis, cli printouts. MDM     DIFFERENTIAL DIAGNOSIS: After history and physical exam differential diagnosis was considered for fracture, CHF/ACS. Pulse ox: 89%: Abnormal on RA, as interpreted by myself    Cardiac Monitor Interpretation: Pulse Readings from Last

## 2017-03-14 NOTE — HISTORICAL OFFICE NOTE
Gina Aleman  : 1925  ACCOUNT:  [id number]  [phone]  PCP: Dr. Deidra Buerger     TODAY'S DATE: 2017  DICTATED BY:  Brent Monge M.D., F.A.C.C. ]    CHIEF COMPLAINT: [Followup of . CAD, established, Followup of .  Heart failure, congestive, F Lisinopril - Oral, Cough and Penicillins - CLASS    MEDICATIONS: Selected prescriptions see below    VITAL SIGNS: [B/P - 134/60 , Pulse - 78, Weight -  156, Height -   60 , BMI - 30.5 ]    CONS: frail. WEIGHT: BMI parameters reviewed and discussed.  HEAD/FA Pantoprazole Sodium   40MG      daily                                    05/20/16 Klor-Con M10          10MEQ     1 tab once daily                         05/20/16 MagOx 400             400 (241  one tab once daily                       03/04/16 Levothyrox FACTORS:  CAD - Hypertension    REVIEW OF SYSTEMS:  CONS: doing well. EYES: denies significant visual changes. ENMT: denies difficulties with hearing, otherwise negative. CV: no chest pains and see HPI. RESP: denies dyspnea, cough or wheezing.  GI: denies m [facility] pacemaker today. Still in atrial fibrillation, sometimes rapid rates up to 160s.]    DECISION MAKIN. Atrial fibrillation. Has persistent atrial fibrillation for the last two months.   Is anticoagulated continuously for over a month, initial 1000UNIT  1 daily      LOW [place] - DD: 03/04/2016 - DT: 03/07/2016 - Job ID: [id number]   C: Dr. Trevor Coy

## 2017-03-14 NOTE — CONSULTS
[facility]    Report of Consultation    Shelia Cardoso Patient Status:  Inpatient    3/1/1925 MRN [id number]   Location [facility] 4W/SW/SE Attending Chung Gallo MD   Hosp Day # 0 PCP Eliana Chin MD     Date of Admission:  3/1 Oral, Daily  •  Cholecalciferol (VITAMIN D) 1000 units tab 1,000 Units, 1,000 Units, Oral, Daily  •  DilTIAZem HCl ER Coated Beads (CARDIZEM CD) 24 hr cap 240 mg, 240 mg, Oral, Daily  •  ferrous sulfate EC tab 325 mg, 325 mg, Oral, Daily with breakfast  • 41.4 03/14/2017    03/14/2017   CREATSERUM 1.34 03/14/2017   BUN 25 03/14/2017    03/14/2017   K 4.4 03/14/2017   CL 99 03/14/2017   CO2 28 03/14/2017    03/14/2017   CA 9.6 03/14/2017   MG 2.2 03/14/2017   TROP 0.03 03/14/2017      Xr ONEALD.  Will order kenalog for injection to knees. Phys tx. Full weight bearing. Bilateral neoprene knee supports.   Thank you for consult        Clemencia Staples  3/14/2017  12:31 PM

## 2017-03-14 NOTE — DISCHARGE PLANNING
MARK met with the pt. At bedside and spoke with her son Anu Brizuela via telephone. The pt. Lives in a one level home and has a caregriver 7 days a week from 7am-1pm.  The pt's caregiver assists with adls, cooking and cleaning. The pt.  Uses a walker in the house an

## 2017-03-14 NOTE — PHYSICAL THERAPY NOTE
PHYSICAL THERAPY EVALUATION - INPATIENT     Room Number: 417/417-A  Evaluation Date: 3/14/2017  Type of Evaluation: Initial  Physician Order: PT Eval and Treat    Presenting Problem: fall  Reason for Therapy: Mobility Dysfunction and Discharge Planning neuropathy ([facility] [address]    • Diverticulitis        Past Surgical History      Past Surgical History    TOTAL HIP REPLACEMENT      Comment Bilateral    REMOVAL GALLBLADDER      HYSTERECTOMY      WRIST FRACTURE SURGERY Right     CARDIAC PACEMAKER PLACEMENT  2014 or (including adjusting bedclothes, sheets and blankets)?: A Lot   -   Sitting down on and standing up from a chair with arms (e.g., wheelchair, bedside commode, etc.): Unable   -   Moving from lying on back to sitting on the side of the bed?: A Lot   How muc with walker - rolling     Goal #2  Current Status    Goal #3 Patient is able to ambulate 50 feet with assist device: walker - rolling at assistance level: minimum assistance   Goal #3   Current Status    Goal #4    Goal #4   Current Status    Goal #5 Anthony

## 2017-03-15 ENCOUNTER — PRIOR ORIGINAL RECORDS (OUTPATIENT)
Dept: OTHER | Age: 82
End: 2017-03-15

## 2017-03-15 LAB
ANION GAP SERPL CALC-SCNC: 3 MMOL/L (ref 0–18)
BASOPHILS # BLD: 0 K/UL (ref 0–0.2)
BASOPHILS NFR BLD: 1 %
BUN SERPL-MCNC: 18 MG/DL (ref 8–20)
BUN/CREAT SERPL: 18.6 (ref 10–20)
CALCIUM SERPL-MCNC: 8.8 MG/DL (ref 8.5–10.5)
CHLORIDE SERPL-SCNC: 98 MMOL/L (ref 95–110)
CO2 SERPL-SCNC: 36 MMOL/L (ref 22–32)
CREAT SERPL-MCNC: 0.97 MG/DL (ref 0.5–1.5)
EOSINOPHIL # BLD: 0.3 K/UL (ref 0–0.7)
EOSINOPHIL NFR BLD: 4 %
ERYTHROCYTE [DISTWIDTH] IN BLOOD BY AUTOMATED COUNT: 14.6 % (ref 11–15)
GLUCOSE SERPL-MCNC: 100 MG/DL (ref 70–99)
HCT VFR BLD AUTO: 34.5 % (ref 35–48)
HGB BLD-MCNC: 11.6 G/DL (ref 12–16)
LYMPHOCYTES # BLD: 2.2 K/UL (ref 1–4)
LYMPHOCYTES NFR BLD: 29 %
MAGNESIUM SERPL-MCNC: 1.9 MG/DL (ref 1.8–2.5)
MCH RBC QN AUTO: 30 PG (ref 27–32)
MCHC RBC AUTO-ENTMCNC: 33.7 G/DL (ref 32–37)
MCV RBC AUTO: 89 FL (ref 80–100)
MONOCYTES # BLD: 0.8 K/UL (ref 0–1)
MONOCYTES NFR BLD: 10 %
NEUTROPHILS # BLD AUTO: 4.3 K/UL (ref 1.8–7.7)
NEUTROPHILS NFR BLD: 57 %
OSMOLALITY UR CALC.SUM OF ELEC: 286 MOSM/KG (ref 275–295)
PLATELET # BLD AUTO: 215 K/UL (ref 140–400)
PMV BLD AUTO: 6.8 FL (ref 7.4–10.3)
POTASSIUM SERPL-SCNC: 3.9 MMOL/L (ref 3.3–5.1)
RBC # BLD AUTO: 3.87 M/UL (ref 3.7–5.4)
SODIUM SERPL-SCNC: 137 MMOL/L (ref 136–144)
WBC # BLD AUTO: 7.6 K/UL (ref 4–11)

## 2017-03-15 PROCEDURE — 83735 ASSAY OF MAGNESIUM: CPT | Performed by: INTERNAL MEDICINE

## 2017-03-15 PROCEDURE — 97530 THERAPEUTIC ACTIVITIES: CPT

## 2017-03-15 PROCEDURE — 80048 BASIC METABOLIC PNL TOTAL CA: CPT | Performed by: INTERNAL MEDICINE

## 2017-03-15 PROCEDURE — 3E0U33Z INTRODUCTION OF ANTI-INFLAMMATORY INTO JOINTS, PERCUTANEOUS APPROACH: ICD-10-PCS | Performed by: ORTHOPAEDIC SURGERY

## 2017-03-15 PROCEDURE — 97110 THERAPEUTIC EXERCISES: CPT

## 2017-03-15 PROCEDURE — 85025 COMPLETE CBC W/AUTO DIFF WBC: CPT | Performed by: INTERNAL MEDICINE

## 2017-03-15 RX ORDER — GENTAMICIN SULFATE 3 MG/ML
1 SOLUTION/ DROPS OPHTHALMIC 2 TIMES DAILY
Status: DISCONTINUED | OUTPATIENT
Start: 2017-03-15 | End: 2017-03-17

## 2017-03-15 RX ORDER — 0.9 % SODIUM CHLORIDE 0.9 %
VIAL (ML) INJECTION
Status: COMPLETED
Start: 2017-03-15 | End: 2017-03-15

## 2017-03-15 NOTE — PLAN OF CARE
GENITOURINARY - ADULT    • Absence of urinary retention-taylor in place Progressing        MUSCULOSKELETAL - ADULT    • Return mobility to safest level of function-was up to chiar with therapy Progressing    • Maintain proper alignment of affected body part

## 2017-03-15 NOTE — PHYSICAL THERAPY NOTE
PHYSICAL THERAPY TREATMENT NOTE - INPATIENT    Room Number: 417/417-A       Presenting Problem: fall    Problem List  Principal Problem:    Accidental fall, initial encounter  Active Problems:    Acute pain of both knees    Lumbago    Atrial fibrillation, a railing?: Total    AM-PAC Score:  Raw Score: 8   PT Approx Degree of Impairment Score: 86.62%   Standardized Score (AM-PAC Scale): 28.58   CMS Modifier (G-Code): CM    FUNCTIONAL ABILITY STATUS  Gait Assessment   Gait Assistance: Maximum assistance  Dist

## 2017-03-15 NOTE — PLAN OF CARE
GENITOURINARY - ADULT    • Absence of urinary retention Progressing    Pt continues to have a taylor     PAIN - ADULT    • Verbalizes/displays adequate comfort level or patient's stated pain goal Progressing    Pt has been receiving prn pain meds from this

## 2017-03-15 NOTE — PROGRESS NOTES
[facility]    Progress Note    Graham Waterman Patient Status:  Inpatient    3/1/1925 MRN [id number]   Location [facility] 4W/SW/SE Attending Oliverio Davis MD   Hosp Day # 1 PCP Dalton Hooper MD       SUBJECTIVE:  Sleeping.  Easil magnesium oxide (MAG-OX) tab 400 mg 400 mg Oral BID   Pantoprazole Sodium (PROTONIX) EC tab 40 mg 40 mg Oral Daily   Potassium Chloride ER (K-DUR M20) CR tab 20 mEq 20 mEq Oral BID   Pravastatin Sodium (PRAVACHOL) tab 20 mg 20 mg Oral Nightly   rOPINIRol discharge, patient will require TBD.         LOLA ZAMUDIO  3/15/2017  12:15 PM

## 2017-03-15 NOTE — PLAN OF CARE
GENITOURINARY - ADULT    • Absence of urinary retention Progressing    Hilario cath inserted this am due to strict i&o and frequency. No s/s infection noted.      MUSCULOSKELETAL - ADULT    • Return mobility to safest level of function Progressing    • Martin

## 2017-03-16 LAB
ANION GAP SERPL CALC-SCNC: 8 MMOL/L (ref 0–18)
BASOPHILS # BLD: 0 K/UL (ref 0–0.2)
BASOPHILS NFR BLD: 0 %
BUN SERPL-MCNC: 17 MG/DL (ref 8–20)
BUN/CREAT SERPL: 22.7 (ref 10–20)
CALCIUM SERPL-MCNC: 9.3 MG/DL (ref 8.5–10.5)
CHLORIDE SERPL-SCNC: 95 MMOL/L (ref 95–110)
CO2 SERPL-SCNC: 36 MMOL/L (ref 22–32)
CREAT SERPL-MCNC: 0.75 MG/DL (ref 0.5–1.5)
EOSINOPHIL # BLD: 0.1 K/UL (ref 0–0.7)
EOSINOPHIL NFR BLD: 1 %
ERYTHROCYTE [DISTWIDTH] IN BLOOD BY AUTOMATED COUNT: 14.3 % (ref 11–15)
GLUCOSE SERPL-MCNC: 124 MG/DL (ref 70–99)
HCT VFR BLD AUTO: 38.1 % (ref 35–48)
HGB BLD-MCNC: 12.7 G/DL (ref 12–16)
LYMPHOCYTES # BLD: 1.1 K/UL (ref 1–4)
LYMPHOCYTES NFR BLD: 16 %
MCH RBC QN AUTO: 29.8 PG (ref 27–32)
MCHC RBC AUTO-ENTMCNC: 33.3 G/DL (ref 32–37)
MCV RBC AUTO: 89.3 FL (ref 80–100)
MONOCYTES # BLD: 0.4 K/UL (ref 0–1)
MONOCYTES NFR BLD: 6 %
NEUTROPHILS # BLD AUTO: 5.4 K/UL (ref 1.8–7.7)
NEUTROPHILS NFR BLD: 77 %
OSMOLALITY UR CALC.SUM OF ELEC: 291 MOSM/KG (ref 275–295)
PLATELET # BLD AUTO: 233 K/UL (ref 140–400)
PMV BLD AUTO: 7.1 FL (ref 7.4–10.3)
POTASSIUM SERPL-SCNC: 3.9 MMOL/L (ref 3.3–5.1)
RBC # BLD AUTO: 4.26 M/UL (ref 3.7–5.4)
SODIUM SERPL-SCNC: 139 MMOL/L (ref 136–144)
WBC # BLD AUTO: 7 K/UL (ref 4–11)

## 2017-03-16 PROCEDURE — 85025 COMPLETE CBC W/AUTO DIFF WBC: CPT | Performed by: INTERNAL MEDICINE

## 2017-03-16 PROCEDURE — 97535 SELF CARE MNGMENT TRAINING: CPT

## 2017-03-16 PROCEDURE — 97110 THERAPEUTIC EXERCISES: CPT

## 2017-03-16 PROCEDURE — 97530 THERAPEUTIC ACTIVITIES: CPT

## 2017-03-16 PROCEDURE — 80048 BASIC METABOLIC PNL TOTAL CA: CPT | Performed by: INTERNAL MEDICINE

## 2017-03-16 RX ORDER — BISACODYL 10 MG
10 SUPPOSITORY, RECTAL RECTAL
Qty: 10 SUPPOSITORY | Refills: 0 | Status: ON HOLD | OUTPATIENT
Start: 2017-03-16 | End: 2018-01-01

## 2017-03-16 RX ORDER — ACETAMINOPHEN 325 MG/1
650 TABLET ORAL EVERY 6 HOURS PRN
Qty: 60 TABLET | Refills: 0 | Status: ON HOLD | OUTPATIENT
Start: 2017-03-16 | End: 2018-01-01

## 2017-03-16 RX ORDER — POLYETHYLENE GLYCOL 3350 17 G/17G
17 POWDER, FOR SOLUTION ORAL DAILY
Qty: 1 EACH | Refills: 0 | Status: ON HOLD | OUTPATIENT
Start: 2017-03-16 | End: 2018-01-01

## 2017-03-16 RX ORDER — 0.9 % SODIUM CHLORIDE 0.9 %
VIAL (ML) INJECTION
Status: COMPLETED
Start: 2017-03-16 | End: 2017-03-16

## 2017-03-16 RX ORDER — GENTAMICIN SULFATE 3 MG/ML
1 SOLUTION/ DROPS OPHTHALMIC 2 TIMES DAILY
Qty: 1 BOTTLE | Refills: 0 | Status: SHIPPED | OUTPATIENT
Start: 2017-03-16 | End: 2017-03-23

## 2017-03-16 RX ORDER — NYSTATIN 100000 U/G
CREAM TOPICAL
Qty: 1 TUBE | Refills: 0 | Status: ON HOLD | OUTPATIENT
Start: 2017-03-16 | End: 2018-01-01

## 2017-03-16 NOTE — PROGRESS NOTES
[facility]    Progress Note    Nghia Dewitt Patient Status:  Inpatient    3/1/1925 MRN [id number]   Location [facility] 4W/SW/SE Attending Rahul Matthews MD   Hosp Day # 2 PCP Carmelita Dumont MD       Interval History:   Feels much 29.8   MCHC  32.8  33.7  33.3   RDW  14.6  14.6  14.3   WBC  9.7  7.6  7.0   PLT  276  215  233     Recent Labs   Lab  03/14/17   0356  03/15/17   0536  03/16/17   0604   GLU  155*  100*  124*   BUN  25*  18  17   CREATSERUM  1.34  0.97  0.75   GFRAA  45* Levothyroxine. [place] for discharge to Rehab, discussed with son.         Halley Ochoa MD  3/16/2017

## 2017-03-16 NOTE — PROGRESS NOTES
[facility]    Progress Note    Shelia Cardoso Patient Status:  Inpatient    3/1/1925 MRN [id number]   Location [facility] 4W/SW/SE Attending Chung Gallo MD   Hosp Day # 2 PCP Eliana Chin MD     Subjective:  Shelia Cardoso is

## 2017-03-16 NOTE — PLAN OF CARE
GENITOURINARY - ADULT    • Absence of urinary retention Progressing        MUSCULOSKELETAL - ADULT    • Return mobility to safest level of function Progressing    • Maintain proper alignment of affected body part Progressing        PAIN - ADULT    • Verbal

## 2017-03-16 NOTE — PHYSICAL THERAPY NOTE
PHYSICAL THERAPY TREATMENT NOTE - INPATIENT    Room Number: 417/417-A       Presenting Problem: fall    Problem List  Principal Problem:    Accidental fall, initial encounter  Active Problems:    Acute pain of both knees    Lumbago    Atrial fibrillation, Score: 8   PT Approx Degree of Impairment Score: 86.62%   Standardized Score (AM-PAC Scale): 28.58   CMS Modifier (G-Code): CM    FUNCTIONAL ABILITY STATUS  Gait Assessment   Gait Assistance: Not tested  Distance (ft): 4-5 side steps  Assistive Device: Rol

## 2017-03-16 NOTE — OCCUPATIONAL THERAPY NOTE
OCCUPATIONAL THERAPY TREATMENT NOTE - INPATIENT     Room Number: 417/417-A         Presenting Problem:  (sp fall with HAYDEE knee contusions)    Problem List  Principal Problem:    Accidental fall, initial encounter  Active Problems:    Acute pain of both kne Putting on and taking off regular lower body clothing?: A Lot  -   Bathing (including washing, rinsing, drying)?: A Lot  -   Toileting, which includes using toilet, bedpan or urinal? : A Little  -   Putting on and taking off regular upper body clothing?: N

## 2017-03-17 VITALS
TEMPERATURE: 98 F | SYSTOLIC BLOOD PRESSURE: 158 MMHG | HEART RATE: 89 BPM | HEIGHT: 62 IN | OXYGEN SATURATION: 91 % | WEIGHT: 153.19 LBS | RESPIRATION RATE: 18 BRPM | DIASTOLIC BLOOD PRESSURE: 58 MMHG | BODY MASS INDEX: 28.19 KG/M2

## 2017-03-17 RX ORDER — 0.9 % SODIUM CHLORIDE 0.9 %
VIAL (ML) INJECTION
Status: COMPLETED
Start: 2017-03-17 | End: 2017-03-17

## 2017-03-17 NOTE — PLAN OF CARE
GENITOURINARY - ADULT    • Absence of urinary retention Adequate for Discharge        MUSCULOSKELETAL - ADULT    • Return mobility to safest level of function Adequate for Discharge    • Maintain proper alignment of affected body part Adequate for Discharg

## 2017-03-17 NOTE — PROGRESS NOTES
[facility]    Progress Note    Marcfernandez Keane Patient Status:  Inpatient    3/1/1925 MRN [id number]   Location [facility] 4W/SW/SE Attending Nelly Stauffer MD   Hosp Day # 3 PCP Tatyana Madera MD       Interval History:   Feels good 233     Recent Labs   Lab  03/15/17   0536  03/16/17   0604   GLU  100*  124*   BUN  18  17   CREATSERUM  0.97  0.75   GFRAA  >60  >60   GFRNAA  54*  >60   CA  8.8  9.3   NA  137  139   K  3.9  3.9   CL  98  95   CO2  36*  36*   MG  1.9   --      No result

## 2017-03-17 NOTE — PLAN OF CARE
Xiomara Dewitt rested well, has very little discomfort, gave tylenol, uses call light appropriately, will DC to [facility] today.

## 2017-03-17 NOTE — DISCHARGE PLANNING
The pt. Is scheduled to discharge to [facility] today 3/17 at 3p, via [address] [facility]. The pt. And son are aware and agreeable.       Report [phone]    [facility], [address] [place] [address]

## 2017-03-20 NOTE — DISCHARGE SUMMARY
[facility]    Discharge Summary    Marquis Givens Patient Status:  Inpatient    3/1/1925 MRN [id number]   Location [facility] 4W/SW/SE Attending No att. providers found   [facility] Day # 3 PCP Lorena Hall MD     Date of Admission: 3/1 Consulting Physician     Dominic Salgado MD Consulting Physician               Discharge Plan:   Discharge Condition: Good    Discharge Medication List as of 3/17/2017 12:45 PM    New Orders    acetaminophen 325 MG Oral Tab  Take 2 tablets (650 mg total) b R-2    Pravastatin Sodium (PRAVACHOL) 20 MG Oral Tab  Take 20 mg by mouth nightly.  , Historical    Cholecalciferol (VITAMIN D) 1000 UNITS Oral Cap  Take 1,000 Units by mouth daily.   , Historical    rOPINIRole HCl (REQUIP) 1 MG Oral Tab  Take 1 mg by mouth

## 2017-03-21 ENCOUNTER — SNF VISIT (OUTPATIENT)
Dept: INTERNAL MEDICINE CLINIC | Facility: SKILLED NURSING FACILITY | Age: 82
End: 2017-03-21

## 2017-03-21 VITALS
OXYGEN SATURATION: 98 % | HEART RATE: 80 BPM | RESPIRATION RATE: 20 BRPM | SYSTOLIC BLOOD PRESSURE: 150 MMHG | DIASTOLIC BLOOD PRESSURE: 77 MMHG | TEMPERATURE: 98 F | BODY MASS INDEX: 28 KG/M2 | WEIGHT: 153 LBS

## 2017-03-21 NOTE — PROGRESS NOTES
Vandana Hassan  : 3/1/1925  Age 80year old  female patient is admitted to [facility] 3/17/17 for Rehabilitation    Chief complaint:Peripheral edema   Inability to ambulate due to knee   Accidental fall, initial encounter   Acute pain of both knees 02/20/1970    Alcohol Use: Yes                Comment: 1 small glass of wine 2-3 times per                 month.       ALLERGIES:    Corn                      Crestor [Rosuvastat*      Digitek [Digoxin]         Iodine I 131 Tositu*      Latex tablet Rfl: 3   aspirin 81 MG Oral Tab Take 81 mg by mouth daily.  Disp:  Rfl:    Ferrous Sulfate 325 (65 FE) MG Oral Tab Take 325 mg by mouth daily with breakfast.   Disp:  Rfl: 0   Pantoprazole Sodium (PROTONIX) 40 MG Oral Tab EC Take 1 tablet by mouth da normal; there is no nystagmus, no drainage from eyes, receiving eyes drops for a recent eye inflammation  HENT: normocephalic; normal nose, no nasal drainage, mucous membranes pink, moist, pharynx no exudate, no visible cerumen.   NECK: supple; FROM; no JVD the time was spent counseling the patient and/or coordinating care.     PACO Pierre  03/21/2017   10:34 AM

## 2017-03-24 ENCOUNTER — SNF VISIT (OUTPATIENT)
Dept: INTERNAL MEDICINE CLINIC | Facility: SKILLED NURSING FACILITY | Age: 82
End: 2017-03-24

## 2017-03-24 VITALS
WEIGHT: 153 LBS | OXYGEN SATURATION: 98 % | HEART RATE: 80 BPM | SYSTOLIC BLOOD PRESSURE: 132 MMHG | DIASTOLIC BLOOD PRESSURE: 78 MMHG | RESPIRATION RATE: 20 BRPM | TEMPERATURE: 98 F | BODY MASS INDEX: 28 KG/M2

## 2017-03-24 DIAGNOSIS — M25.562 ACUTE PAIN OF BOTH KNEES: ICD-10-CM

## 2017-03-24 DIAGNOSIS — R26.2 INABILITY TO AMBULATE DUE TO KNEE: Primary | ICD-10-CM

## 2017-03-24 DIAGNOSIS — M25.561 ACUTE PAIN OF BOTH KNEES: ICD-10-CM

## 2017-03-24 NOTE — PROGRESS NOTES
Isaak Bruce  : 3/1/1925  Age 80year old  female [facility]    Ad[facility] to [facility] on: 3/14/17 to 3/17/17    Chief complaint:Peripheral edema   Inability to ambulate due to knee   Accidental Tube Rfl: 0   Polyethylene Glycol 3350 Oral Powd Pack Take 17 g by mouth daily. Disp: 1 each Rfl: 0   bisacodyl 10 MG Rectal Suppos Place 1 suppository (10 mg total) rectally daily as needed.  Disp: 10 suppository Rfl: 0   furosemide 40 MG Oral Tab Take 40 noted  EYES:no visual complaints or deficits  HENT: denies nasal congestion, sinus pain or sore throat;  RESPIRATORY: denies shortness of breath, wheezing or cough   CARDIOVASCULAR:denies chest pain, no palpitations   GI: denies nausea, vomiting, constipat THIS VISIT:  Reviewed [facility] records    SEE PLAN BELOW  1.  Knee pain s/p fall  -received steriod injections both knees By Dr. Xi Lainez to monitor knee bruises  -cont pain management with acetaminophen 650 mg every 6 prn    -cont PT/OT  -Will nee

## 2017-03-27 ENCOUNTER — SNF VISIT (OUTPATIENT)
Dept: INTERNAL MEDICINE CLINIC | Facility: SKILLED NURSING FACILITY | Age: 82
End: 2017-03-27

## 2017-03-27 VITALS
TEMPERATURE: 97 F | OXYGEN SATURATION: 97 % | DIASTOLIC BLOOD PRESSURE: 68 MMHG | SYSTOLIC BLOOD PRESSURE: 119 MMHG | RESPIRATION RATE: 18 BRPM | BODY MASS INDEX: 25 KG/M2 | WEIGHT: 139 LBS | HEART RATE: 62 BPM

## 2017-03-27 DIAGNOSIS — M25.561 ACUTE PAIN OF BOTH KNEES: Primary | ICD-10-CM

## 2017-03-27 DIAGNOSIS — I10 ESSENTIAL HYPERTENSION: ICD-10-CM

## 2017-03-27 DIAGNOSIS — Z91.81 RISK FOR FALLS: ICD-10-CM

## 2017-03-27 DIAGNOSIS — M25.562 ACUTE PAIN OF BOTH KNEES: Primary | ICD-10-CM

## 2017-03-27 NOTE — PROGRESS NOTES
Kulwinder Carlisle  : 3/1/1925  Age 80year old  female patient is admitted to [facility] 3/17/17 for Rehabilitation    Admitted to [facility] on:3/14/17 to 3/17/17      Chief complaint::Peripheral edema    Inability to ambulate due to knee    Acci 2 tablets (650 mg total) by mouth every 6 (six) hours as needed. Disp: 60 tablet Rfl: 0   nystatin 256244 UNIT/GM External Cream Apply to skin folds twice  A day. Disp: 1 Tube Rfl: 0   Polyethylene Glycol 3350 Oral Powd Pack Take 17 g by mouth daily.  Disp: Wt 139 lb (63.05 kg)  SpO2 97%      REVIEW OF SYSTEMS:  GENERAL HEALTH:feels well otherwise  SKIN: reports bruises on knees and rash under skin folds  WOUNDS: none noted  EYES:no visual complaints or deficits  HENT: denies nasal congestion, sinus pain or edema  NEUROLOGIC: intact; no sensorimotor deficit  PSYCHIATRIC: alert and oriented x 3; affect appropriate      MEDICAL DECISION MAKING  POA son    DIAGNOSTICS REVIEWED AT THIS VISIT:  Reviewed [address] [place] [address] records    SEE PLAN BELOW  1.  Knee pain s/p fall- improvi

## 2017-03-28 ENCOUNTER — SNF VISIT (OUTPATIENT)
Dept: INTERNAL MEDICINE CLINIC | Facility: SKILLED NURSING FACILITY | Age: 82
End: 2017-03-28

## 2017-03-28 VITALS
RESPIRATION RATE: 18 BRPM | DIASTOLIC BLOOD PRESSURE: 64 MMHG | SYSTOLIC BLOOD PRESSURE: 130 MMHG | WEIGHT: 170 LBS | BODY MASS INDEX: 31 KG/M2 | TEMPERATURE: 97 F | OXYGEN SATURATION: 96 % | HEART RATE: 70 BPM

## 2017-03-28 DIAGNOSIS — I10 ESSENTIAL HYPERTENSION: ICD-10-CM

## 2017-03-28 DIAGNOSIS — M25.562 ACUTE PAIN OF BOTH KNEES: Primary | ICD-10-CM

## 2017-03-28 DIAGNOSIS — M25.561 ACUTE PAIN OF BOTH KNEES: Primary | ICD-10-CM

## 2017-04-04 ENCOUNTER — SNF VISIT (OUTPATIENT)
Dept: INTERNAL MEDICINE CLINIC | Facility: SKILLED NURSING FACILITY | Age: 82
End: 2017-04-04

## 2017-04-04 VITALS
SYSTOLIC BLOOD PRESSURE: 145 MMHG | OXYGEN SATURATION: 98 % | HEART RATE: 80 BPM | RESPIRATION RATE: 18 BRPM | TEMPERATURE: 98 F | BODY MASS INDEX: 28 KG/M2 | DIASTOLIC BLOOD PRESSURE: 65 MMHG | WEIGHT: 153 LBS

## 2017-04-04 DIAGNOSIS — M25.561 ACUTE PAIN OF BOTH KNEES: Primary | ICD-10-CM

## 2017-04-04 DIAGNOSIS — M25.562 ACUTE PAIN OF BOTH KNEES: Primary | ICD-10-CM

## 2017-04-04 NOTE — PROGRESS NOTES
Akhilon Klyah  : 3/1/1925  Age 80year old  female patient is admitted to [place] [address] for 3/17/17 for Rehabilitation    Admitted to [facility] on:3/14/17 to 3/17/17    Chief complaint:Peripheral edema    Inability to ambulate due to knee    Acc Outpatient Prescriptions:  acetaminophen 325 MG Oral Tab Take 2 tablets (650 mg total) by mouth every 6 (six) hours as needed. Disp: 60 tablet Rfl: 0   nystatin 997279 UNIT/GM External Cream Apply to skin folds twice  A day.  Disp: 1 Tube Rfl: 0   Polyethyl Pulse 80  Temp(Src) 98 °F (36.7 °C) (Tympanic)  Resp 18  Wt 153 lb (69.4 kg)  SpO2 98%      REVIEW OF SYSTEMS:  GENERAL HEALTH:feels well otherwise  SKIN: reports bruises on knees and rash under skin folds, both improving  WOUNDS: none noted  EYES:no visu discolored  MUSCULOSKELETAL: no acute synovitis upper or lower extremity  EXTREMITIES/VASCULAR:no cyanosis, clubbing or edema  NEUROLOGIC: intact; no sensorimotor deficit  PSYCHIATRIC: alert and oriented x 3; affect appropriate but confused slightly, easy

## 2017-04-05 ENCOUNTER — SNF VISIT (OUTPATIENT)
Dept: INTERNAL MEDICINE CLINIC | Facility: SKILLED NURSING FACILITY | Age: 82
End: 2017-04-05

## 2017-04-05 VITALS
TEMPERATURE: 98 F | SYSTOLIC BLOOD PRESSURE: 117 MMHG | WEIGHT: 153 LBS | BODY MASS INDEX: 28 KG/M2 | HEART RATE: 80 BPM | DIASTOLIC BLOOD PRESSURE: 78 MMHG | RESPIRATION RATE: 20 BRPM | OXYGEN SATURATION: 96 %

## 2017-04-05 DIAGNOSIS — M25.562 ACUTE PAIN OF BOTH KNEES: Primary | ICD-10-CM

## 2017-04-05 DIAGNOSIS — I10 ESSENTIAL HYPERTENSION: ICD-10-CM

## 2017-04-05 DIAGNOSIS — M25.561 ACUTE PAIN OF BOTH KNEES: Primary | ICD-10-CM

## 2017-04-05 NOTE — PROGRESS NOTES
Christian Herrera, 11/1925, 80year old, female is being discharged from [place] [address] 3/17/17 for Rehabilitation      DISCHARGE SUMMARY    Date of Admission:3/14/17 to 3/17/17 [address] [place] [address]    Date of Discharge: 3/17/17 TO 4/5/17                               Gracie anemia, taking Iron tabs  ENDOCRINE: denies excessive thirst or urination; denies unexpected wt gain or wt loss  ALLERGY/IMM.: denies food or seasonal allergies    PHYSICAL EXAM:  GENERAL HEALTH: well developed, well nourished, in no apparent distress  CASTRO bid  -Dietician  followed  5. Constipation -resolved  -cont to monitor stools daily  -cont Miralax and senna prn    -Dulcolax suppos prn  -ctm  6. Htn- stable  -VS Q SHIFT  - Cont Ditiazem CD 240mg po daily  -cpm  7.  Bacterial Infection    -cont to monitor

## 2017-04-20 LAB — MAGNESIUM: 1.9 MG/DL

## 2017-05-09 ENCOUNTER — PRIOR ORIGINAL RECORDS (OUTPATIENT)
Dept: OTHER | Age: 82
End: 2017-05-09

## 2017-07-27 PROBLEM — M43.6 TORTICOLLIS: Status: ACTIVE | Noted: 2017-01-01

## 2017-07-27 NOTE — ED INITIAL ASSESSMENT (HPI)
Woke up this morning with neck pain. Started radiating to shoulders, head, and ears throughout the day.  Contacted EMS for tport to hospital. Norbertoies MARLA, n/v/d, states that she does have a little more trouble breathing than usual.

## 2017-07-28 NOTE — CONSULTS
Delmi Keane    80year old  Wt Readings from Last 6 Encounters:  07/27/17 : 161 lb 11.2 oz (73.3 kg)  04/05/17 : 153 lb (69.4 kg)  04/04/17 : 153 lb (69.4 kg)  03/28/17 : 170 lb (77.1 kg)  03/27/17 : 139 lb (63 kg)  03/24/17 : 153 lb (69.4 kg)        Naima Oconnor Oral Tab Take 1 tablet (400 mg total) by mouth 2 (two) times daily. Disp: 60 tablet Rfl: 3   aspirin 81 MG Oral Tab Take 81 mg by mouth daily.  Disp:  Rfl:    Ferrous Sulfate 325 (65 FE) MG Oral Tab Take 325 mg by mouth daily with breakfast.   Disp:  Rfl: 0 EXAM     07/28/17  0956   BP: 133/59   Pulse: 79   Resp: 20   Temp: 97.6 °F (36.4 °C)       No intake or output data in the 24 hours ending 07/28/17 1022        Patient has good strength in the upper and lower extremities including shoulder abductors, gio

## 2017-07-28 NOTE — PROGRESS NOTES
[facility] Note:  Renal Dose Adjustment for Cetirizine (ZYRTEC)    Daljit Whittaker has been prescribed Cetirizine (Zyrtec) 10 mg orally daily. Estimated Creatinine Clearance: 24.6 mL/min (based on SCr of 1.05 mg/dL).     Her calculated creatinine clearan

## 2017-07-28 NOTE — PHYSICAL THERAPY NOTE
PHYSICAL THERAPY EVALUATION - INPATIENT     Room Number: 566/566-A  Evaluation Date: 7/28/2017  Type of Evaluation: Initial  Physician Order: PT Eval and Treat    Presenting Problem: Torticollis and neck pain  Reason for Therapy: Mobility Dysfunction a apnea, peripheral neuropathy, RA, HLD, and Afib      Problem List  Principal Problem:    Torticollis  Active Problems:    Atrial fibrillation, persistent (HCC)    HTN (hypertension)    Diastolic CHF, chronic (HCC)    Hypothyroidism      Past Medical Histor limited    Lower extremity ROM is within functional limits    Lower extremity strength is grossly 3/5 strength     BALANCE  Static Sitting: Good  Dynamic Sitting: Fair +  Static Standing: Fair -  Dynamic Standing: Poor +    ADDITIONAL TESTS  Additional Bijal met by: 8/5/17  Patient Goal Patient's self-stated goal is: to go home    Goal #1 Patient is able to demonstrate supine - sit EOB @ level: modified independent     Goal #1   Current Status    Goal #2 Patient is able to demonstrate transfers Sit to/from [facility]

## 2017-07-28 NOTE — OCCUPATIONAL THERAPY NOTE
OCCUPATIONAL THERAPY EVALUATION - INPATIENT     Room Number: 566/566-A  Evaluation Date: 7/28/2017  Type of Evaluation: Initial  Presenting Problem:  (neck pain)    Physician Order: IP Consult to Occupational Therapy  Reason for Therapy: ADL/IADL Dysfuncti • Macular degeneration    • Osteoporosis    • Peripheral neuropathy ([facility])    • Restless leg syndrome    • Rheumatoid arthritis(714.0) ([facility])    • Sleep apnea    • Thyroid disease    • Unspecified essential hypertension    • Wrist fracture     RT       Pas off regular upper body clothing?: A Lot  -   Taking care of personal grooming such as brushing teeth?: A Little  -   Eating meals?: A Little    AM-PAC Score:  Score: 14  Approx Degree of Impairment: 59.67%  Standardized Score (AM-PAC Scale): 33.39  CMS Mod

## 2017-07-28 NOTE — ED PROVIDER NOTES
Patient Seen in: [facility] 5sw/se    History   Patient presents with:  Neck Pain (musculoskeletal, neurologic)    Stated Complaint: Neck/ear pain    HPI    80-year-old female presents for evaluation of right neck pain.   Patient reports she woke u Sennosides-Docusate Sodium 8.6-50 MG Oral Tab,  Take 2 tablets by mouth nightly as needed for constipation. magnesium oxide 400 MG Oral Tab,  Take 1 tablet (400 mg total) by mouth 2 (two) times daily.    aspirin 81 MG Oral Tab,  Take 81 mg by mouth alessandro 1747]    Current:/60 (BP Location: Right arm)   Pulse 91   Temp 97.9 °F (36.6 °C) (Oral)   Resp 20   Ht 152.4 cm (5')   Wt 73.3 kg   SpO2 95%   BMI 31.58 kg/m²         Physical Exam   Constitutional: She is oriented to person, place, and time.  She ap METABOLIC PANEL (8)   MAGNESIUM   RAINBOW DRAW BLUE   RAINBOW DRAW GOLD   RAINBOW DRAW LAVENDER   RAINBOW DRAW LIGHT GREEN   RAINBOW DRAW LAVENDER TALL (BNP)   RAINBOW DRAW DARK GREEN     EKG    Rate, intervals and axes as noted on EKG Report.   Rate: 100

## 2017-07-28 NOTE — PHYSICAL THERAPY NOTE
Attempted physical therapy evaluation. Patient presented in bed with 8/10 pain at neck. Patient declined physical therapy as she was in too much pain and wanting to rest. Patient also reported that she is going to have imaging performed of her neck.  Bonilla sandoval

## 2017-07-28 NOTE — PROGRESS NOTES
Horace Atkinson    Wt Readings from Last 6 Encounters:  07/27/17 : 161 lb 11.2 oz (73.3 kg)  04/05/17 : 153 lb (69.4 kg)  04/04/17 : 153 lb (69.4 kg)  03/28/17 : 170 lb (77.1 kg)  03/27/17 : 139 lb (63 kg)  03/24/17 : 153 lb (69.4 kg)    80year old        P 400 MG Oral Tab Take 1 tablet (400 mg total) by mouth 2 (two) times daily. Disp: 60 tablet Rfl: 3   aspirin 81 MG Oral Tab Take 81 mg by mouth daily.  Disp:  Rfl:    Ferrous Sulfate 325 (65 FE) MG Oral Tab Take 325 mg by mouth daily with breakfast.   Disp: PHYSICAL EXAM     07/27/17  1946 07/27/17 2117 07/27/17 2235 07/28/17  0956   BP: 135/64 152/60  133/59   Pulse: 96 91  79   Resp: 18 20  20   Temp:  97.9 °F (36.6 °C)  97.6 °F (36.4 °C)   TempSrc:  Oral  Oral   SpO2: 97% 95%  93%   Weight:   1

## 2017-07-28 NOTE — PLAN OF CARE
Problem: Patient Centered Care  Goal: Patient preferences are identified and integrated in the patient's plan of care  Interventions:  - What would you like us to know as we care for you?   - Provide timely, complete, and accurate information to patient/fa using appropriate pain scale  - Administer analgesics based on type and severity of pain and evaluate response  - Implement non-pharmacological measures as appropriate and evaluate response  - Consider cultural and social influences on pain and pain manage

## 2017-07-28 NOTE — H&P
[facility] Patient Status:  Observation    3/1/1925 MRN [id number]   Location [facility] 5SW/SE Attending Cindy Batista MD   Hosp Day # 0 PCP Yeyo Huerta MD     Date:  2017  Date of A Disp: 60 tablet Rfl: 0 7/26/2017 at Unknown time   nystatin [id number] UNIT/GM External Cream Apply to skin folds twice  A day. Disp: 1 Tube Rfl: 0 7/26/2017 at Unknown time   furosemide 40 MG Oral Tab Take 40 mg by mouth 2 (two) times daily.  Disp:  Rfl:  7/27 loratadine 10 MG Oral Tab Take 10 mg by mouth daily as needed for Allergies. Disp:  Rfl:  Unknown at Unknown time   Cholecalciferol (VITAMIN D) 1000 UNITS Oral Cap Take 1,000 Units by mouth daily.    Disp:  Rfl:  Taking       Review of Systems:     Enoc Davis ([facility]  Compensated. Hypothyroidism  Resume Levothyroxine.           South Aquino MD  7/28/2017  8:02 AM

## 2017-07-28 NOTE — PROGRESS NOTES
Patient has been stable through the shifts. Patient is currently up and eating dinner. Patient had complaints of pain earlier in her neck on right side, PRN flexeral was given with good effect.  Patient was seen by today by PT/OT and is recommending rehab f

## 2017-07-29 NOTE — CHRONIC PAIN
[facility] - [facility]  Report of Consultation    Harsha Nazario Patient Status:  Observation    3/1/1925 MRN [id number]   Location [facility] 5SW/SE Attending Damion Loredo MD   Hosp Day # 0 PCP Annalee Mckinley MD     Date of Admission:  / Pregabalin                Raloxifene                Shellfish-Derived P*        Medications:    Current Facility-Administered Medications:   •  [START ON 7/30/2017] Pantoprazole Sodium (PROTONIX) EC tab 40 mg, 40 mg, Oral, QAM AC  •  acetaminophen (TYLEN mg Oral Daily   • furosemide  40 mg Oral BID (Diuretic)   • Levothyroxine Sodium  88 mcg Oral Before breakfast   • magnesium oxide  400 mg Oral BID   • Cholecalciferol  1,000 Units Oral Daily     Continuous Infusions:   PRN Meds:.acetaminophen, Cetirizine 7/28/2017 at 12:37          =====  CONCLUSION:             1. Demineralization. 2. Moderate osteoarthritis. 3. Atherosclerosis. 4. Pacemaker leads.               Impression:  Patient Active Problem List:     Congenital spondylolisthesis     Degeneration

## 2017-07-29 NOTE — PROGRESS NOTES
[facility]    Progress Note    Krystal Whalen Patient Status:  Observation    3/1/1925 MRN [id number]   Location [facility] 5SW/SE Attending Chester Crenshaw MD   Hosp Day # 0 PCP Candance Shack, MD       Interval History:   Feels bett 59*  >60   GFRNAA  49*  >60   CA  9.0  8.6   NA  135*  138   K  3.8  3.7   CL  92*  94*   CO2  33*  35*   MG   --   2.0     No results for input(s): ALKPHO, AST, ALT, BILT, TP, PT, INR, PTT in the last 72 hours.     Xr Cervical Spine (2 Views) (cpt=[id number])

## 2017-07-29 NOTE — PLAN OF CARE
Pt discharge home alert and oriented with 02 @ 2lnc , removed PIV, pt left unit in stable condition by ambulance, belongings taken by pt, instructions and given to EMS son Ajay Walter made aware of discharge

## 2017-07-29 NOTE — HOME CARE LIAISON
Met with pt in her room prior to discharge. Discussed home health, she has had services before. She is in agreement with the plan for services through [address]. Referral sent to [facility] intake.

## 2017-07-29 NOTE — PLAN OF CARE
Problem: Patient Centered Care  Goal: Patient preferences are identified and integrated in the patient's plan of care  Interventions:  - What would you like us to know as we care for you?   - Provide timely, complete, and accurate information to patient/fa severity of pain and evaluate response  - Implement non-pharmacological measures as appropriate and evaluate response  - Consider cultural and social influences on pain and pain management  - Manage/alleviate anxiety  - Utilize distraction and/or relaxatio

## 2017-07-30 NOTE — PLAN OF CARE
Problem: Patient/Family Goals  Goal: Patient/Family Long Term Goal  Patient's Long Term Goal: to be discharged back home. Interventions:  - Monitor labs/vitals.   - Pain control.  - See additional Care Plan goals for specific interventions    Outcome: Pr precautions as indicated by assessment.  - Educate pt/family on patient safety including physical limitations  - Instruct pt to call for assistance with activity based on assessment  - Modify environment to reduce risk of injury  - Provide assistive device

## 2017-07-30 NOTE — ED NOTES
Patient reports pain between the shoulder blades that has increased to \"intolerable levels\" today. Denies chest pain, denies jaw pain, denies shoulder pain and denies radiating pain.

## 2017-07-30 NOTE — DISCHARGE PLANNING
Pt was just discharged yesterday, 7/29, w/ [facility]. Pt was observation last hospitalization and is currently observation this hospitalization. If SNF is recommended, pt would not have benefits for SNF and would have to pay out of pocket.  PT/OT to e

## 2017-07-30 NOTE — PLAN OF CARE
Problem: Patient Centered Care  Goal: Patient preferences are identified and integrated in the patient's plan of care  Interventions:  - What would you like us to know as we care for you?   - Provide timely, complete, and accurate information to patient/fa redness and/or skin breakdown  - Initiate interventions, skin care algorithm/standards of care as needed  Outcome: Not Progressing  Redness under abd. Folds, breasts, groin area. Med rec completed. Meds reordered, PT/OT/SW consults ordered.   Orders car

## 2017-07-30 NOTE — PHYSICAL THERAPY NOTE
PHYSICAL THERAPY TREATMENT NOTE - INPATIENT    Room Number: 554/554-A     Session: PT treatment             Problem List  Principal Problem:    Torticollis      Past Medical History  Past Medical History:   Diagnosis Date   • Arthritis    • Chronic a-fib down on and standing up from a chair with arms (e.g., wheelchair, bedside commode, etc.): A Lot   -   Moving from lying on back to sitting on the side of the bed?: A Lot   How much help from another person does the patient currently need. ..   -   Moving to with walker - rolling   Goal #2  Current Status     Goal #3 Patient is able to ambulate 20 feet with assist device: walker - rolling at assistance level: supervision   Goal #3   Current Status     Goal #4     Goal #4   Current Status     Goal #5 Patient to

## 2017-07-30 NOTE — H&P
[facility] Patient Status:  Observation    3/1/1925 MRN [id number]   Location [facility] 5SW/SE Attending Siri Talavera MD   Hosp Day # 0 PCP Dawson Hobson MD     Date:  2017  Date of A mouth every 8 (eight) hours as needed (muscle spasm). Disp: 20 tablet Rfl: 0 Taking   acetaminophen 325 MG Oral Tab Take 2 tablets (650 mg total) by mouth every 6 (six) hours as needed.  Disp: 60 tablet Rfl: 0 Taking   nystatin [id number] UNIT/GM External Cream Taking   gabapentin (NEURONTIN) 100 MG Oral Cap TAKE 2 CAPSULES BY MOUTH AT BEDTIME (Patient taking differently: 1 cap at bedtime) Disp: 60 capsule Rfl: 0 Taking       Review of Systems:     Constitutional: Positive for fatigue.    Respiratory: Negative for

## 2017-07-30 NOTE — ED PROVIDER NOTES
Patient Seen in: [facility] Emergency Department    History   Patient presents with:  Neck Pain (musculoskeletal, neurologic)    Stated Complaint: neck pain    HPI    Patient is a 55-year-old female from independent living who presents with severe daily. Levothyroxine Sodium 88 MCG Oral Tab,  Take 88 mcg by mouth before breakfast.   apixaban 2.5 MG Oral Tab,  Take 5 mg by mouth 2 (two) times daily. DiltiaZEM HCl ER Coated Beads 240 MG Oral Capsule SR 24 Hr,  Take 240 mg by mouth daily.    Sennosi air)    Current:/41   Pulse 84   Temp 98.4 °F (36.9 °C) (Temporal)   Resp 20   Wt 64 kg   SpO2 95%   BMI 27.54 kg/m²         Physical Exam  GENERAL: No acute distress, awake and alert  HEENT: MMM, EOMI, PERRL  Neck: TTP diffusely in paracervical musc

## 2017-07-30 NOTE — ED INITIAL ASSESSMENT (HPI)
Dx with torticollis a few days ago, was discharged home. Patient c/o increased neck pain. No further complaints.

## 2017-07-31 ENCOUNTER — PRIOR ORIGINAL RECORDS (OUTPATIENT)
Dept: OTHER | Age: 82
End: 2017-07-31

## 2017-07-31 NOTE — PAYOR COMM NOTE
HPI:   Salas Antoine is a(n) 80year old female with PMH of HTN, Atrial fibrillation. CHF admitted for evaluation   And treatment of severe right sided neck pains for the past 5-6 days.  Was discharged from the Hospital   yesterday , but at home pain got wo

## 2017-07-31 NOTE — PLAN OF CARE
Problem: Patient/Family Goals  Goal: Patient/Family Long Term Goal  Patient's Long Term Goal: to be discharged back home. Interventions:  - Monitor labs/vitals.   - Pain control.  - See additional Care Plan goals for specific interventions    Outcome: Pr response  - Consider cultural and social influences on pain and pain management  - Manage/alleviate anxiety  - Utilize distraction and/or relaxation techniques  - Monitor for opioid side effects  - Notify MD/LIP if interventions unsuccessful or patient rep

## 2017-07-31 NOTE — PROCEDURES
Trigger Point Injection    Pt placed in in supine upright position with head turned to the left side. Area cleaned and prepped with alcohol. Trigger point identified along the right cervical trapzeious muscle.   Using a 25g needle, 5ml of 0.25% Marcaine p

## 2017-07-31 NOTE — PROGRESS NOTES
[facility]    Progress Note    Kenzie Cruise Patient Status:  Observation    3/1/1925 MRN [id number]   Location [facility] 5SW/SE Attending Oswald Daniel MD   Hosp Day # 0 PCP Terri Ch MD       Interval History:   Feels bett 2.0     No results for input(s): ALKPHO, AST, ALT, BILT, TP, PT, INR, PTT in the last 72 hours. Xr Cervical Spine (2 Views) (tfe=[id number])    Result Date: 7/28/2017  CONCLUSION:  1. Demineralization. 2. Moderate osteoarthritis. 3. Atherosclerosis.  4. Pac

## 2017-07-31 NOTE — OCCUPATIONAL THERAPY NOTE
OCCUPATIONAL THERAPY EVALUATION - INPATIENT     Room Number: 554/554-A  Evaluation Date: 7/31/2017  Type of Evaluation: Initial  Presenting Problem:  (neck pain )    Physician Order: IP Consult to Occupational Therapy  Reason for Therapy: ADL/IADL Dysfunct cervical exercises to promote better ROM and to improve flexibility  - pt also completed periscapular isometrics from seated position in protraction/retraction and shoulder shrugs.   Sit to stand mod a x 1 with slow shuffling gait to pivot to b/s chair - de distances)    Stairs in Home: 1 stoop to enter  Use of Assistive Device(s): RW for short distance with slow gait     Prior Level of [place]: pt has a caregiver 7am to 8pm at night that assist with am/pm adls, uses RW for functional transfers to and fr Little  -   Eating meals?: None    AM-PAC Score:  Score: 16  Approx Degree of Impairment: 53.32%  Standardized Score (AM-PAC Scale): 35.96  CMS Modifier (G-Code): CK    FUNCTIONAL TRANSFER ASSESSMENT  Supine to Sit : Moderate assistance  Sit to Stand:  Mode

## 2017-07-31 NOTE — PHYSICAL THERAPY NOTE
PHYSICAL THERAPY EVALUATION - INPATIENT     Room Number: 554/554-A  Evaluation Date: 7/31/2017  Type of Evaluation: Initial  Physician Order: PT Eval and Treat    Presenting Problem: Torticollis R sided  Reason for Therapy: Mobility Dysfunction and Dis to current admission: Pt was just discharged yesterday, 7/29, w/ [facility] Pt was observation last hospitalization and is currently observation this hospitalization.     Problem List  Principal Problem:    Torticollis      Past Medical History  Past 3-/5<>3/5  BALANCE  Static Sitting: Fair +  Dynamic Sitting: Fair +  Static Standing: Poor +  Dynamic Standing: Poor    ACTIVITY TOLERANCE  No shortness of breath    AM-PAC '6-Clicks' INPATIENT SHORT FORM - BASIC MOBILITY  How much difficulty does the jaden Goal #3 Patient is able to ambulate 50 feet with assist device: walker - rolling at assistance level: minimum assistance   Goal #3   Current Status    Goal #4    Goal #4   Current Status    Goal #5 Patient to demonstrate independence with home activity/e

## 2017-07-31 NOTE — PLAN OF CARE
NEUROLOGICAL - ADULT    • Achieves stable or improved neurological status Progressing        PAIN - ADULT    • Verbalizes/displays adequate comfort level or patient's stated pain goal Progressing        Patient Centered Care    • Patient preferences are id

## 2017-07-31 NOTE — PROGRESS NOTES
[facility]  Report of Consultation    Emmy North Patient Status:  Observation    3/1/1925 MRN [id number]   Location [facility] 5SW/SE Attending Starla Millard MD   Hosp Day # 0 PCP Dolph Hammans, MD     Date of Admission:  / Pregabalin                Raloxifene                Shellfish-Derived P*        Medications:    Current Facility-Administered Medications:   •  acetaminophen (TYLENOL) tab 650 mg, 650 mg, Oral, Q6H PRN  •  apixaban (ELIQUIS) tab 5 mg, 5 mg, Oral, BID Alert  Siting in bed eating breakfast  + right cervical paraspinal muscle spasm  Limited cervical ROM due to pain    Laboratory Data:    Lab Results  Component Value Date   CREATSERUM 0.89 07/31/2017   BUN 25 07/31/2017    07/31/2017   K 4.3 07/31/ Sciatica     Chondrocalcinosis of left wrist     Ulnar impaction syndrome, left     Chronic diastolic CHF (congestive heart failure) (HCC)     Atrial fibrillation, persistent (HCC)     HTN (hypertension)     Accidental fall     Accidental fall, initial enc

## 2017-08-01 NOTE — PLAN OF CARE
Problem: Patient/Family Goals  Goal: Patient/Family Long Term Goal  Patient's Long Term Goal: to be discharged back home. Interventions:  - Monitor labs/vitals.   - Pain control.  - See additional Care Plan goals for specific interventions    Outcome: Pr Encourage pt to monitor pain and request assistance  - Assess pain using appropriate pain scale  - Administer analgesics based on type and severity of pain and evaluate response  - Implement non-pharmacological measures as appropriate and evaluate response

## 2017-08-01 NOTE — PLAN OF CARE
Problem: NEUROLOGICAL - ADULT  Goal: Achieves stable or improved neurological status  INTERVENTIONS  - Assess for and report changes in neurological status  - Initiate measures to prevent increased intracranial pressure  - Maintain blood pressure and fluid falls and injury, call light within reach, alarms on and audible    Problem: SKIN/TISSUE INTEGRITY - ADULT  Goal: Skin integrity remains intact  INTERVENTIONS  - Assess and document risk factors for pressure ulcer development  - Assess and document skin in

## 2017-08-01 NOTE — CM/SW NOTE
Called by Rn states son wants patient to go to [facility] on dc. CTL spoke with son Srinath Wesley to explain his mom is oversedvation status and in order to go to [facility] it would have to be paid privately and not by insurance.  Figueroa Skaggs Se agrees for referral to be

## 2017-08-01 NOTE — PLAN OF CARE
Pt discharge to [facility] by ambulance in stable condition. Report given to manfred CARBAJAL, belonging send with pt.

## 2017-08-01 NOTE — PHYSICAL THERAPY NOTE
PHYSICAL THERAPY TREATMENT NOTE - INPATIENT    Room Number: 554/554-A       Presenting Problem: Torticollis R sided    Problem List  Principal Problem:    Torticollis      ASSESSMENT   Noted to have and increase cervical  AROM with pt jean marie to have a less Static Sitting: Fair +  Dynamic Sitting: Fair           Static Standing: Poor +  Dynamic Standing: Poor    ACTIVITY TOLERANCE  O2 Saturation at rest 94% and with activity 95%  Liters of O2:  3    AM-PAC '6-Clicks' INPATIENT SHORT FORM - B Goals to be met by: 8/10/17    All goals ongoing 8/1/2017    Patient Goal Patient's self-stated goal is: home and no pain   Goal #1 Patient is able to demonstrate supine - sit EOB @ level: minimum assistance   Goal #1   Current Status     Goal #2 Patient

## 2017-08-01 NOTE — PROGRESS NOTES
[facility]    Progress Note    Negrito Holden Patient Status:  Observation    3/1/1925 MRN [id number]   Location [facility] 5SW/SE Attending Twyla Mendoza MD   Hosp Day # 0 PCP Laine Schneider MD       Interval History:   Sleepy, fe the last 72 hours. Assessment and Plan:     Torticollis  Better, s/p trigger point injection right trapezius muscle. Atrial fibrillation, persistent (HCC)  HR controlled. HTN (hypertension)  Stable.       Diastolic CHF, chronic (HCC)

## 2017-08-01 NOTE — DISCHARGE PLANNING
LYNNE received for [facility] services. Pt had previously agreed to a referral to [facility]. Updated referral made to [facility]/Julia.     MYKEL poe [id number]

## 2017-08-01 NOTE — DISCHARGE PLANNING
4:51pm Update- Pt has been financially accepted at [facility]. SNF liaison spoke with the family member Carolyn Antonio who is aware of the plan to transfer this evening. Pt requires O2 so an ambulance has been arranged for today 8/1 at 6:30pm via [employer].     Repor

## 2017-08-03 NOTE — PROGRESS NOTES
Twan Casillas  : 3/1/1925  Age 80year old  female patient is admitted to [place] [address] for strengthening and rehabilitation on 2017. Chief complaint: Torticollis     HPI   80year old female with PMH of HTN, Atrial fibrillation.  CHF admitted fo essential hypertension    • Wrist fracture     RT     Past Surgical History:  2014 or 2015: CARDIAC PACEMAKER PLACEMENT  No date: HYSTERECTOMY  No date: REMOVAL GALLBLADDER  No date: TOTAL HIP REPLACEMENT      Comment: Bilateral  No date: WRIST FRACTURE DEGROOT times daily. Disp:  Rfl:    Levothyroxine Sodium 88 MCG Oral Tab Take 88 mcg by mouth before breakfast. Disp:  Rfl:    apixaban 2.5 MG Oral Tab Take 5 mg by mouth 2 (two) times daily.  Disp:  Rfl:    DiltiaZEM HCl ER Coated Beads 240 MG Oral Capsule SR 24 H diarrhea; no rectal bleeding; no heartburn  :no dysuria, urgency or frequency; no vaginal discharge; no urinary incontinence; no hematuria  MUSCULOSKELETAL:no joint complaints upper or lower extremities  NEURO:no sensory or motor complaint  PSYCHE: no sy BID  -Continue ASA  -BMP ordered 8/4      5. Hypothyroidism  -Continue Levothyroxine    6.  Discharge plan  -Will need family care conference to discuss plan  -Followed with [facility] previously   F/U with PCP within 1 week of discharge from rehab    T

## 2017-08-04 NOTE — DISCHARGE SUMMARY
[place] [facility]    Discharge Summary    Elle Tao Patient Status:  Observation    3/1/1925 MRN [id number]   Location [facility] 5SW/SE Attending No att. providers found   Hosp Day # 0 PCP Rosario Storm MD     Date of Admission:  MG Oral Tab  Take 1 tablet by mouth every 8 (eight) hours as needed., Script printed, Disp-20 tablet, R-0    lidocaine 5 % External Patch  Place 1 patch onto the skin daily. , Script printed, Disp-15 patch, R-0      Home Meds - Unchanged    diazepam 2 MG Or R-2    Pravastatin Sodium (PRAVACHOL) 20 MG Oral Tab  Take 20 mg by mouth nightly.  , Historical    Cholecalciferol (VITAMIN D) 1000 UNITS Oral Cap  Take 1,000 Units by mouth daily.   , Historical    rOPINIRole HCl (REQUIP) 1 MG Oral Tab  Take 1 mg by mouth

## 2017-08-23 ENCOUNTER — PRIOR ORIGINAL RECORDS (OUTPATIENT)
Dept: OTHER | Age: 82
End: 2017-08-23

## 2017-08-23 LAB
BUN: 25 MG/DL
CREATININE, SERUM: 0.89 MG/DL
GLUCOSE: 123 MG/DL
POTASSIUM, SERUM: 4.3 MEQ/L
SODIUM: 139 MEQ/L

## 2017-09-28 ENCOUNTER — PRIOR ORIGINAL RECORDS (OUTPATIENT)
Dept: OTHER | Age: 82
End: 2017-09-28

## 2017-09-28 PROBLEM — I50.33 ACUTE ON CHRONIC DIASTOLIC CONGESTIVE HEART FAILURE (HCC): Chronic | Status: ACTIVE | Noted: 2017-03-14

## 2017-09-28 NOTE — PROGRESS NOTES
Jhony Acosta Patient Status:  Outpatient    3/1/1925 MRN [id number]   Location Jenkins Klinefelter, MD Bartholomew Mowers is a 80year old female who presents to clinic for acute on chronic d 09/28/2017 12:39 PM   K 3.9 09/28/2017 12:39 PM   CL 99 09/28/2017 12:39 PM   CO2 31 09/28/2017 12:39 PM   GLU 98 09/28/2017 12:39 PM   CA 9.1 09/28/2017 12:39 PM   MG 2.0 07/31/2017 06:14 AM   TROP 0.03 07/27/2017 06:03 PM       Clinical labs drawn by MA: Patient is accompanied with her son and caretaker. Per son,  the previous caretaker had been providing his mom with high sodium foods such as pizza, canned tomato soup, etc.  Since finding this out she has been placed back on low sodium diet.   Clinic wt 1 soy sauce, pre-packaged rice or potatoes. Please remember to read nutrition labels for sodium content.      Return to clinic on 10/5/17    Additional recommendations:     · Compare your home medications with the medication list attached, call with questions

## 2017-09-28 NOTE — PATIENT INSTRUCTIONS
Increase Lasix to 60 mg twice daily for 3 days, then Lasix 40 mg twice daily    Increase KDur 30 meq twice daily for 3 days, then KDur 20 meq twice daily    Monitor yourself for signs and symptoms of fluid overload, increased shortness of breath, difficult

## 2017-10-17 ENCOUNTER — PRIOR ORIGINAL RECORDS (OUTPATIENT)
Dept: OTHER | Age: 82
End: 2017-10-17

## 2017-10-18 NOTE — PROGRESS NOTES
Jhony Acosta Patient Status:  Outpatient    3/1/1925 MRN [id number]   Location Jenkins Klinefelter, MD Verner Master, MD Rosales Johnson is a 80year old female who presents to clinic for 28 10/17/2017 09:00 AM   ALT 18 10/17/2017 09:00 AM   MG 2.0 07/31/2017 06:14 AM   TROP 0.03 07/27/2017 06:03 PM       Clinical labs drawn by MA: BMP- Stable, BNP- stable    /64   Pulse 79   Wt 176 lb (79.8 kg)   SpO2 91%   BMI 34.37 kg/m²     Hospit Returns today with continued LE edema and decrease in O2 Sat to 88% on RA. Patient is accompanied with her son and caretaker. Care giver adhering to low sodium diet. Son has been buying frozen vegetables for his mom. Clinic wt 176.   Last hospitalizatio or potatoes. Please remember to read nutrition labels for sodium content.      Return to clinic on 10/25/17      Additional recommendations:     · Compare your home medications with the medication list attached, call with questions or there are any differen

## 2017-10-18 NOTE — PATIENT INSTRUCTIONS
Resume wearing O2 2L/NC    Increase Lasix to 60 mg twice daily.       Increase K Dur to 30 meq in am and 20 meq in pm    Monitor yourself for signs and symptoms of fluid overload, increased shortness of breath, difficulty breathing when laying down etc. Nikita

## 2017-10-26 NOTE — PATIENT INSTRUCTIONS
Resume wearing O2 2L/NC    Stop furosemide (Lasix)    Start Torsemide 20 mg twice daily      Increase K Dur to 20 meq twice daily    Monitor yourself for signs and symptoms of fluid overload, increased shortness of breath, difficulty breathing when laying

## 2017-10-26 NOTE — PROGRESS NOTES
Jhony Acosta Patient Status:  Outpatient    3/1/1925 MRN [id number]   Location AmeliaMD Marco A Pierre MD Corinn Fillers is a 80year old female who presents to clinic for MA: BMP- Stable, Mg Stable    /51 (BP Location: Left arm, Patient Position: Sitting, Cuff Size: adult)   Pulse 93   Resp 18   Wt 172 lb 4.8 oz (78.2 kg)   SpO2 90%   BMI 33.65 kg/m²     Hospital D/C wt: 164  Clinic weights: 1) 176 2) 176 3) 172  Home Crackles in lungs have improved, but still present. Son has been preparing her meals trying to adhere to low sodium diet, however, provided her with turkey sausage this am.  Clinic wt 172.  Home weight not done this am.  Last hospitalization discharge wt 1 · Compare your home medications with the medication list attached, call with questions or there are any differences    · Heart healthy, decreased refined sugars, and  2000 mg sodium restricted diet and maintain fluids at 32 to 64 ounces per day.  Common

## 2017-11-02 NOTE — PATIENT INSTRUCTIONS
Continue Torsemide 20 mg twice daily      Continue K Dur 20 meq twice daily    Increase potassium rich foods in your diet    Monitor yourself for signs and symptoms of fluid overload, increased shortness of breath, difficulty breathing when laying down etc

## 2017-11-02 NOTE — PROGRESS NOTES
Jhony Acosta Patient Status:  Outpatient    3/1/1925 MRN [id number]   Location MD Frankie Hawk MD Sixto Schatz is a 80year old female who presents to clinic for 06:03 PM       Clinical labs drawn by MA: BMP- Stable, Mg Stable    /62   Pulse 86   Wt 169 lb (76.7 kg)   SpO2 90%   BMI 33.01 kg/m²     Hospital D/C wt: 164  Clinic weights: 1) 176 2) 176 3) 172 4) 169  Home Wt:              2)165 3)    General eron adhering to low sodium diet, which he has significantly improved on. Clinic wt 169. Home weight not done this am.  Last hospitalization discharge wt 164.   No JVD, few fine crackles bibasilar,  Soft abdomen, 1+ LE edema bilaterally to upper shins, erythema Please remember to read nutrition labels for sodium content. · Limit caffeine to no more than 16 ounces per day     · Exercise daily as tolerated, with goal of doing moderate aerobic exercise like walking for about 30 minutes 5 days per week.  Start by

## 2017-11-09 NOTE — PROGRESS NOTES
Jhony Acosta Patient Status:  Outpatient    3/1/1925 MRN [id number]   Location MD Annie Cedeño MD Hildegarde Sasha is a 80year old female who presents to clinic for Clinical labs drawn by MA: BMP- Stable, Mg Stable    /84   Pulse 52   Wt 170 lb 8 oz (77.3 kg)   SpO2 94%   BMI 33.30 kg/m²     Hospital D/C wt: 164  Clinic weights: 1) 176 2) 176 3) 172 4) 169 5) 170   Home Wt:              2)165      IV start done this am.  Last hospitalization discharge wt 164. No JVD,  Fine crackles in lungs. ,  Soft abdomen, 1+ LE edema bilaterally to upper shins,No further improvement in lower extremity edema. Son is struggling to continue to care for his mom.   He ha rice or potatoes. Please remember to read nutrition labels for sodium content.      · Limit caffeine to no more than 16 ounces per day     · Exercise daily as tolerated, with goal of doing moderate aerobic exercise like walking for about 30 minutes 5 days p

## 2017-11-09 NOTE — PATIENT INSTRUCTIONS
Continue Torsemide 20 mg twice daily      Continue K Dur 20 meq twice daily    Monitor yourself for signs and symptoms of fluid overload, increased shortness of breath, difficulty breathing when laying down etc. Call the clinic if any of these signs develo

## 2017-11-29 ENCOUNTER — PRIOR ORIGINAL RECORDS (OUTPATIENT)
Dept: OTHER | Age: 82
End: 2017-11-29

## 2017-11-29 PROBLEM — E87.70 VOLUME OVERLOAD: Status: ACTIVE | Noted: 2017-01-01

## 2017-11-29 NOTE — PATIENT INSTRUCTIONS
Hold Torsemide dose tonight, but give KDur 20 meq tonight    Increase Torsemide to 40 mg twice daily for 3 days, then torsemide 20 mg twice daily    Increase K Dur 20 meq three times a day for 3 days, then 20 meq twice  daily    Monitor yourself for signs your activity to prevent shortness of breath or fatigue.  Stop exercise if you develop chest pain, lightheadedness, or significant shortness of breath

## 2017-11-29 NOTE — PROGRESS NOTES
Jhony Acosta Patient Status:  Outpatient    3/1/1925 MRN [id number]   Location MD Larry Montemayor MD Mathews Teo is a 80year old female who presents to clinic for Stable, BNP    /42   Pulse 68   Wt 175 lb (79.4 kg)   SpO2 90%   BMI 34.18 kg/m²     Hospital D/C wt: 164  Clinic weights: 1) 176 2) 176 3) 172 4) 169 5) 170 6) 175  Home Wt:              5)237                    General appearance: alert, appears st [place] for dipping. Clinic wt 175. Wt up 5 pounds since last visit. Up 11 pounds from goal weight. Home weight not done this am.  No JVD,  Crackles 2/3 up bilaterally,  Soft abdomen, 2+ LE edema bilaterally to knees, firm.      Son is struggling to [employer] medications with the medication list attached, call with questions or there are any differences    · Heart healthy, decreased refined sugars, and  2000 mg sodium restricted diet and maintain fluids at 32 to 64 ounces per day.  Common high sodium foods inclu

## 2017-12-06 ENCOUNTER — PRIOR ORIGINAL RECORDS (OUTPATIENT)
Dept: OTHER | Age: 82
End: 2017-12-06

## 2017-12-06 LAB
ALT (SGPT): 18 U/L
AST (SGOT): 28 U/L
CHOLESTEROL, TOTAL: 139 MG/DL
HDL CHOLESTEROL: 46 MG/DL
LDL CHOLESTEROL: 67 MG/DL
TRIGLYCERIDES: 132 MG/DL

## 2017-12-07 NOTE — ED NOTES
Pt assisted up from the bed and to washroom with walker. Pt able to move slowly but does c/o generalized pain. Pt awaiting diet tray. Will continue to monitor.

## 2017-12-07 NOTE — ED PROVIDER NOTES
Patient Seen in: [facility] Emergency Department    History   Patient presents with:  Fall (musculoskeletal, neurologic)    Stated Complaint: FALL, LEFT SHOULDER, LOWER BACK AND RIGHT KNEE PAIN    HPI  The patient is a 49-year-old female who is br reviewed and negative except as noted above.     Physical Exam   ED Triage Vitals [12/07/17 0714]  BP: 132/74  Pulse: 84  Resp: 20  Temp: (!) 97.4 °F (36.3 °C)  Temp src: Oral  SpO2: (!) 88 %  O2 Device: None (Room air)    Current:/77   Pulse 85   Tem and vitals reviewed.     Differential diagnosis includes fracture, cause of fall including balance, infection palpitations, anemia, dehydration        ED Course     Labs Reviewed   BASIC METABOLIC PANEL (8) - Abnormal; Notable for the following:        Resu Radiology findings: Xr Knee (1 Or 2 Views), Right (cpt=73560)    Result Date: 12/7/2017  CONCLUSION:  1. No acute findings. 2. DJD. Xr Shoulder, Complete (min 2 Views), Right (cpt=73030)    Result Date: 12/7/2017  CONCLUSION:  1.  No acute findi Medication List    START taking these medications    cephALEXin 250 MG Oral Cap  Take 1 capsule (250 mg total) by mouth 3 (three) times daily.   Qty: 21 capsule Refills: 0

## 2017-12-07 NOTE — ED NOTES
Pt's son Jessenia Buck is here at bedside. Updated on medications given today. Will f/u with pmd.  Meds for uti as told side effects discussed. Told to return with any new or worsening symptoms. Son and patient verbalized knowledge. Out via wheelchair.   Pt and s

## 2017-12-07 NOTE — CM/SW NOTE
Patient will be d/c from the Er. She is current with [facility] for nursing. We will add PT/OT.  Order in computer and spoke with ehsan wilde [facility] ,Pj Burt and she will send updated referral for PT/OT

## 2017-12-07 NOTE — HOME CARE LIAISON
NOTIFIED BY TORREY HICKS THAT PATIENT CURRENTLY IN ER. PATIENT IS ACTIVE WITH [facility] AND ORDERS RECEIVED FOR NURSING, PT AND OT. HAVE UPDATED RESIDENTIAL STAFF WHO WILL FOLLOW UP WITH PATIENT AFTER DISCHARGE.

## 2017-12-07 NOTE — ED INITIAL ASSESSMENT (HPI)
Pt sts that she fell onto her right side while attempting to step off of her scale at home. sts she tripped backwards striking her entire right side. Struck the back of her head, but denies loc. C/o right shoulder, lower back, and right knee pain.   EMS

## 2017-12-12 LAB
BUN: 18 MG/DL
CREATININE, SERUM: 1.08 MG/DL
GLUCOSE: 117 MG/DL
POTASSIUM, SERUM: 3.9 MEQ/L
SODIUM: 134 MEQ/L

## 2017-12-28 NOTE — PROGRESS NOTES
Jhony Acosta Patient Status:  Outpatient    3/1/1925 MRN [id number]   Location MD Corrine Leonard MD Gerre Creeks is a 80year old female who presents to clinic for 09:00 AM   MG 2.2 10/26/2017 10:57 AM   TROP 0.03 07/27/2017 06:03 PM       Clinical labs drawn by MA: BMP- Stable, BNP    /66   Pulse 78   Wt 173 lb (78.5 kg)   SpO2 (!) 89%   BMI 33.79 kg/m²     Hospital D/C wt: 164  Clinic weights: 1) 176 2) 176 3 extremities over the last couple of days since [place]. Has had significant dietary indiscretions with eating ham, french fries, [employer], etc.  Appetite remains good. Patient states having good energy, she gifty's and knits. Clinic wt 173.  Still up · Compare your home medications with the medication list attached, call with questions or there are any differences    · Heart healthy, decreased refined sugars, and  2000 mg sodium restricted diet and maintain fluids at 32 to 64 ounces per day.  Common

## 2017-12-28 NOTE — PATIENT INSTRUCTIONS
Hold Torsemide dose tonight, but give KDur 20 meq tonight    Increase Torsemide to 40 mg twice daily     Increase K Dur 30 meq (take 3 tablets) twice a day    REPEAT BLOOD WORK PRIOR TO YOUR APPOINTMENT WITH DR. MAYEN's APN on 1/5/18    Follow up appointm your activity to prevent shortness of breath or fatigue.  Stop exercise if you develop chest pain, lightheadedness, or significant shortness of breath

## 2018-01-01 ENCOUNTER — LAB REQUISITION (OUTPATIENT)
Dept: LAB | Facility: HOSPITAL | Age: 83
End: 2018-01-01
Payer: MEDICARE

## 2018-01-01 ENCOUNTER — APPOINTMENT (OUTPATIENT)
Dept: GENERAL RADIOLOGY | Facility: HOSPITAL | Age: 83
DRG: 253 | End: 2018-01-01
Attending: SURGERY
Payer: MEDICARE

## 2018-01-01 ENCOUNTER — ANESTHESIA (OUTPATIENT)
Dept: ENDOSCOPY | Facility: HOSPITAL | Age: 83
DRG: 378 | End: 2018-01-01
Payer: MEDICARE

## 2018-01-01 ENCOUNTER — SNF/IP PROF CHARGE ONLY (OUTPATIENT)
Dept: INTERNAL MEDICINE CLINIC | Facility: CLINIC | Age: 83
End: 2018-01-01

## 2018-01-01 ENCOUNTER — ANESTHESIA (OUTPATIENT)
Dept: SURGERY | Facility: HOSPITAL | Age: 83
DRG: 253 | End: 2018-01-01
Payer: MEDICARE

## 2018-01-01 ENCOUNTER — OFFICE VISIT (OUTPATIENT)
Dept: CARDIOLOGY CLINIC | Facility: HOSPITAL | Age: 83
End: 2018-01-01
Attending: INTERNAL MEDICINE
Payer: MEDICARE

## 2018-01-01 ENCOUNTER — APPOINTMENT (OUTPATIENT)
Dept: GENERAL RADIOLOGY | Facility: HOSPITAL | Age: 83
DRG: 253 | End: 2018-01-01
Attending: EMERGENCY MEDICINE
Payer: MEDICARE

## 2018-01-01 ENCOUNTER — HOSPITAL ENCOUNTER (EMERGENCY)
Facility: HOSPITAL | Age: 83
Discharge: HOME | End: 2018-01-01
Attending: EMERGENCY MEDICINE
Payer: MEDICARE

## 2018-01-01 ENCOUNTER — APPOINTMENT (OUTPATIENT)
Dept: GENERAL RADIOLOGY | Facility: HOSPITAL | Age: 83
DRG: 190 | End: 2018-01-01
Attending: EMERGENCY MEDICINE
Payer: MEDICARE

## 2018-01-01 ENCOUNTER — SURGERY (OUTPATIENT)
Age: 83
End: 2018-01-01

## 2018-01-01 ENCOUNTER — APPOINTMENT (OUTPATIENT)
Dept: GENERAL RADIOLOGY | Facility: HOSPITAL | Age: 83
End: 2018-01-01
Attending: EMERGENCY MEDICINE
Payer: MEDICARE

## 2018-01-01 ENCOUNTER — HOSPITAL ENCOUNTER (INPATIENT)
Facility: HOSPITAL | Age: 83
LOS: 5 days | Discharge: NURSING, REHAB OR LONG-TERM CARE | DRG: 378 | End: 2018-01-01
Attending: EMERGENCY MEDICINE | Admitting: INTERNAL MEDICINE
Payer: MEDICARE

## 2018-01-01 ENCOUNTER — HOSPITAL ENCOUNTER (EMERGENCY)
Facility: HOSPITAL | Age: 83
Discharge: NURSING, REHAB OR LONG-TERM CARE | End: 2018-01-01
Attending: EMERGENCY MEDICINE
Payer: MEDICARE

## 2018-01-01 ENCOUNTER — ANESTHESIA EVENT (OUTPATIENT)
Dept: ENDOSCOPY | Facility: HOSPITAL | Age: 83
DRG: 378 | End: 2018-01-01
Payer: MEDICARE

## 2018-01-01 ENCOUNTER — ANESTHESIA EVENT (OUTPATIENT)
Dept: ENDOSCOPY | Facility: HOSPITAL | Age: 83
DRG: 190 | End: 2018-01-01
Payer: MEDICARE

## 2018-01-01 ENCOUNTER — ANESTHESIA EVENT (OUTPATIENT)
Dept: SURGERY | Facility: HOSPITAL | Age: 83
End: 2018-01-01

## 2018-01-01 ENCOUNTER — APPOINTMENT (OUTPATIENT)
Dept: CV DIAGNOSTICS | Facility: HOSPITAL | Age: 83
DRG: 190 | End: 2018-01-01
Attending: INTERNAL MEDICINE
Payer: MEDICARE

## 2018-01-01 ENCOUNTER — SNF VISIT (OUTPATIENT)
Dept: INTERNAL MEDICINE CLINIC | Facility: SKILLED NURSING FACILITY | Age: 83
End: 2018-01-01

## 2018-01-01 ENCOUNTER — ANESTHESIA EVENT (OUTPATIENT)
Dept: SURGERY | Facility: HOSPITAL | Age: 83
DRG: 253 | End: 2018-01-01
Payer: MEDICARE

## 2018-01-01 ENCOUNTER — APPOINTMENT (OUTPATIENT)
Dept: CT IMAGING | Facility: HOSPITAL | Age: 83
DRG: 253 | End: 2018-01-01
Attending: SURGERY
Payer: MEDICARE

## 2018-01-01 ENCOUNTER — LAB REQUISITION (OUTPATIENT)
Dept: LAB | Facility: HOSPITAL | Age: 83
DRG: 378 | End: 2018-01-01
Payer: MEDICARE

## 2018-01-01 ENCOUNTER — APPOINTMENT (OUTPATIENT)
Dept: GENERAL RADIOLOGY | Facility: HOSPITAL | Age: 83
End: 2018-01-01
Payer: MEDICARE

## 2018-01-01 ENCOUNTER — APPOINTMENT (OUTPATIENT)
Dept: GENERAL RADIOLOGY | Facility: HOSPITAL | Age: 83
DRG: 378 | End: 2018-01-01
Attending: FAMILY MEDICINE
Payer: MEDICARE

## 2018-01-01 ENCOUNTER — APPOINTMENT (OUTPATIENT)
Dept: ULTRASOUND IMAGING | Facility: HOSPITAL | Age: 83
DRG: 253 | End: 2018-01-01
Attending: SURGERY
Payer: MEDICARE

## 2018-01-01 ENCOUNTER — APPOINTMENT (OUTPATIENT)
Dept: LAB | Facility: HOSPITAL | Age: 83
End: 2018-01-01
Attending: NURSE PRACTITIONER
Payer: MEDICARE

## 2018-01-01 ENCOUNTER — HOSPITAL ENCOUNTER (INPATIENT)
Facility: HOSPITAL | Age: 83
LOS: 5 days | Discharge: HOME HEALTH | DRG: 190 | End: 2018-01-01
Attending: EMERGENCY MEDICINE | Admitting: INTERNAL MEDICINE
Payer: MEDICARE

## 2018-01-01 ENCOUNTER — ANESTHESIA (OUTPATIENT)
Dept: ENDOSCOPY | Facility: HOSPITAL | Age: 83
DRG: 190 | End: 2018-01-01
Payer: MEDICARE

## 2018-01-01 ENCOUNTER — HOSPITAL ENCOUNTER (INPATIENT)
Facility: HOSPITAL | Age: 83
LOS: 4 days | DRG: 300 | End: 2018-01-01
Attending: INTERNAL MEDICINE | Admitting: INTERNAL MEDICINE
Payer: OTHER MISCELLANEOUS

## 2018-01-01 ENCOUNTER — TELEPHONE (OUTPATIENT)
Dept: CARDIOLOGY CLINIC | Facility: HOSPITAL | Age: 83
End: 2018-01-01

## 2018-01-01 ENCOUNTER — APPOINTMENT (OUTPATIENT)
Dept: CT IMAGING | Facility: HOSPITAL | Age: 83
DRG: 253 | End: 2018-01-01
Attending: EMERGENCY MEDICINE
Payer: MEDICARE

## 2018-01-01 ENCOUNTER — HOSPITAL ENCOUNTER (INPATIENT)
Facility: HOSPITAL | Age: 83
LOS: 6 days | Discharge: HOSPICE | DRG: 253 | End: 2018-01-01
Attending: EMERGENCY MEDICINE | Admitting: FAMILY MEDICINE
Payer: MEDICARE

## 2018-01-01 ENCOUNTER — TELEPHONE (OUTPATIENT)
Dept: INTERNAL MEDICINE CLINIC | Facility: CLINIC | Age: 83
End: 2018-01-01

## 2018-01-01 ENCOUNTER — ANESTHESIA (OUTPATIENT)
Dept: SURGERY | Facility: HOSPITAL | Age: 83
End: 2018-01-01

## 2018-01-01 VITALS
BODY MASS INDEX: 33 KG/M2 | DIASTOLIC BLOOD PRESSURE: 70 MMHG | SYSTOLIC BLOOD PRESSURE: 123 MMHG | HEART RATE: 94 BPM | OXYGEN SATURATION: 90 % | WEIGHT: 170 LBS

## 2018-01-01 VITALS
WEIGHT: 158 LBS | SYSTOLIC BLOOD PRESSURE: 130 MMHG | OXYGEN SATURATION: 92 % | BODY MASS INDEX: 29 KG/M2 | HEART RATE: 100 BPM | RESPIRATION RATE: 20 BRPM | TEMPERATURE: 98 F | DIASTOLIC BLOOD PRESSURE: 68 MMHG

## 2018-01-01 VITALS
SYSTOLIC BLOOD PRESSURE: 123 MMHG | TEMPERATURE: 98 F | RESPIRATION RATE: 20 BRPM | WEIGHT: 160 LBS | OXYGEN SATURATION: 96 % | BODY MASS INDEX: 29.44 KG/M2 | HEART RATE: 91 BPM | HEIGHT: 62 IN | DIASTOLIC BLOOD PRESSURE: 65 MMHG

## 2018-01-01 VITALS
TEMPERATURE: 98 F | WEIGHT: 163.56 LBS | HEIGHT: 62 IN | DIASTOLIC BLOOD PRESSURE: 54 MMHG | RESPIRATION RATE: 20 BRPM | HEART RATE: 139 BPM | BODY MASS INDEX: 30.1 KG/M2 | SYSTOLIC BLOOD PRESSURE: 109 MMHG | OXYGEN SATURATION: 94 %

## 2018-01-01 VITALS
TEMPERATURE: 98 F | DIASTOLIC BLOOD PRESSURE: 46 MMHG | OXYGEN SATURATION: 92 % | HEART RATE: 100 BPM | RESPIRATION RATE: 24 BRPM | SYSTOLIC BLOOD PRESSURE: 118 MMHG

## 2018-01-01 VITALS
HEART RATE: 80 BPM | DIASTOLIC BLOOD PRESSURE: 28 MMHG | RESPIRATION RATE: 24 BRPM | TEMPERATURE: 102 F | SYSTOLIC BLOOD PRESSURE: 106 MMHG | OXYGEN SATURATION: 83 %

## 2018-01-01 VITALS
SYSTOLIC BLOOD PRESSURE: 112 MMHG | DIASTOLIC BLOOD PRESSURE: 64 MMHG | HEART RATE: 88 BPM | TEMPERATURE: 98 F | RESPIRATION RATE: 16 BRPM | OXYGEN SATURATION: 92 %

## 2018-01-01 VITALS
SYSTOLIC BLOOD PRESSURE: 125 MMHG | OXYGEN SATURATION: 91 % | TEMPERATURE: 99 F | HEART RATE: 92 BPM | WEIGHT: 152.38 LBS | HEIGHT: 62 IN | RESPIRATION RATE: 20 BRPM | DIASTOLIC BLOOD PRESSURE: 54 MMHG | BODY MASS INDEX: 28.04 KG/M2

## 2018-01-01 VITALS
SYSTOLIC BLOOD PRESSURE: 125 MMHG | HEART RATE: 91 BPM | RESPIRATION RATE: 18 BRPM | TEMPERATURE: 98 F | OXYGEN SATURATION: 92 % | DIASTOLIC BLOOD PRESSURE: 67 MMHG | WEIGHT: 145 LBS | BODY MASS INDEX: 27 KG/M2

## 2018-01-01 VITALS
OXYGEN SATURATION: 99 % | HEART RATE: 91 BPM | DIASTOLIC BLOOD PRESSURE: 67 MMHG | HEIGHT: 62 IN | SYSTOLIC BLOOD PRESSURE: 119 MMHG | BODY MASS INDEX: 29.6 KG/M2 | WEIGHT: 160.88 LBS | TEMPERATURE: 98 F | RESPIRATION RATE: 16 BRPM

## 2018-01-01 DIAGNOSIS — M25.561 ACUTE PAIN OF BOTH KNEES: ICD-10-CM

## 2018-01-01 DIAGNOSIS — I50.9 HEART FAILURE (HCC): ICD-10-CM

## 2018-01-01 DIAGNOSIS — B97.89 VIRAL RESPIRATORY ILLNESS: ICD-10-CM

## 2018-01-01 DIAGNOSIS — I50.32 CHRONIC DIASTOLIC CHF (CONGESTIVE HEART FAILURE) (HCC): ICD-10-CM

## 2018-01-01 DIAGNOSIS — K31.811 AVM (ARTERIOVENOUS MALFORMATION) OF DUODENUM, ACQUIRED WITH HEMORRHAGE: ICD-10-CM

## 2018-01-01 DIAGNOSIS — I10 ESSENTIAL HYPERTENSION: ICD-10-CM

## 2018-01-01 DIAGNOSIS — J44.1 COPD WITH ACUTE EXACERBATION (HCC): Primary | ICD-10-CM

## 2018-01-01 DIAGNOSIS — I50.33 ACUTE ON CHRONIC DIASTOLIC CONGESTIVE HEART FAILURE (HCC): Chronic | ICD-10-CM

## 2018-01-01 DIAGNOSIS — K57.90 DIVERTICULOSIS: ICD-10-CM

## 2018-01-01 DIAGNOSIS — I48.91 ATRIAL FIBRILLATION (HCC): ICD-10-CM

## 2018-01-01 DIAGNOSIS — M25.562 ACUTE PAIN OF BOTH KNEES: ICD-10-CM

## 2018-01-01 DIAGNOSIS — K92.2 GASTROINTESTINAL HEMORRHAGE, UNSPECIFIED GASTROINTESTINAL HEMORRHAGE TYPE: Primary | ICD-10-CM

## 2018-01-01 DIAGNOSIS — M62.81 MUSCLE WEAKNESS: ICD-10-CM

## 2018-01-01 DIAGNOSIS — Z01.89 ENCOUNTER FOR OTHER SPECIFIED SPECIAL EXAMINATIONS: ICD-10-CM

## 2018-01-01 DIAGNOSIS — I10 ESSENTIAL (PRIMARY) HYPERTENSION: ICD-10-CM

## 2018-01-01 DIAGNOSIS — I50.40 COMBINED CONGESTIVE SYSTOLIC AND DIASTOLIC HEART FAILURE (HCC): ICD-10-CM

## 2018-01-01 DIAGNOSIS — N39.0 URINARY TRACT INFECTION: ICD-10-CM

## 2018-01-01 DIAGNOSIS — E03.9 HYPOTHYROIDISM, UNSPECIFIED TYPE: Chronic | ICD-10-CM

## 2018-01-01 DIAGNOSIS — I50.9 CONGESTIVE HEART FAILURE, UNSPECIFIED HF CHRONICITY, UNSPECIFIED HEART FAILURE TYPE (HCC): Primary | ICD-10-CM

## 2018-01-01 DIAGNOSIS — I50.33 ACUTE ON CHRONIC DIASTOLIC CONGESTIVE HEART FAILURE (HCC): Primary | Chronic | ICD-10-CM

## 2018-01-01 DIAGNOSIS — I74.5 ILIAC ARTERY THROMBOSIS (HCC): ICD-10-CM

## 2018-01-01 DIAGNOSIS — I73.9 ARTERIAL INSUFFICIENCY OF LOWER EXTREMITY (HCC): Primary | ICD-10-CM

## 2018-01-01 DIAGNOSIS — D50.0 ANEMIA DUE TO GI BLOOD LOSS: ICD-10-CM

## 2018-01-01 DIAGNOSIS — I50.9 HEART FAILURE, UNSPECIFIED (HCC): ICD-10-CM

## 2018-01-01 DIAGNOSIS — I48.19 ATRIAL FIBRILLATION, PERSISTENT (HCC): ICD-10-CM

## 2018-01-01 DIAGNOSIS — D64.9 ANEMIA, UNSPECIFIED TYPE: ICD-10-CM

## 2018-01-01 DIAGNOSIS — K92.2 GASTROINTESTINAL HEMORRHAGE, UNSPECIFIED GASTROINTESTINAL HEMORRHAGE TYPE: ICD-10-CM

## 2018-01-01 DIAGNOSIS — K44.9 HIATAL HERNIA: ICD-10-CM

## 2018-01-01 DIAGNOSIS — J98.8 VIRAL RESPIRATORY ILLNESS: ICD-10-CM

## 2018-01-01 DIAGNOSIS — K92.1 MELENA: ICD-10-CM

## 2018-01-01 DIAGNOSIS — R07.9 CHEST PAIN OF UNCERTAIN ETIOLOGY: Primary | ICD-10-CM

## 2018-01-01 DIAGNOSIS — D50.9 IRON DEFICIENCY ANEMIA, UNSPECIFIED IRON DEFICIENCY ANEMIA TYPE: ICD-10-CM

## 2018-01-01 DIAGNOSIS — E16.2 HYPOGLYCEMIA: ICD-10-CM

## 2018-01-01 DIAGNOSIS — I48.20 CHRONIC ATRIAL FIBRILLATION (HCC): ICD-10-CM

## 2018-01-01 DIAGNOSIS — I50.23 ACUTE ON CHRONIC SYSTOLIC CONGESTIVE HEART FAILURE (HCC): ICD-10-CM

## 2018-01-01 DIAGNOSIS — Z99.81 DEPENDENCE ON SUPPLEMENTAL OXYGEN: ICD-10-CM

## 2018-01-01 DIAGNOSIS — I10 ESSENTIAL HYPERTENSION: Primary | ICD-10-CM

## 2018-01-01 DIAGNOSIS — I50.9 ACUTE ON CHRONIC CONGESTIVE HEART FAILURE, UNSPECIFIED CONGESTIVE HEART FAILURE TYPE: ICD-10-CM

## 2018-01-01 DIAGNOSIS — K92.2 GASTROINTESTINAL HEMORRHAGE: ICD-10-CM

## 2018-01-01 LAB
ADENOVIRUS PCR:: NEGATIVE
ALBUMIN SERPL BCP-MCNC: 2.6 G/DL (ref 3.5–4.8)
ALBUMIN SERPL BCP-MCNC: 2.7 G/DL (ref 3.5–4.8)
ALBUMIN SERPL BCP-MCNC: 2.9 G/DL (ref 3.5–4.8)
ALBUMIN SERPL BCP-MCNC: 3.3 G/DL (ref 3.5–4.8)
ALBUMIN SERPL BCP-MCNC: 3.4 G/DL (ref 3.5–4.8)
ALBUMIN SERPL BCP-MCNC: 3.4 G/DL (ref 3.5–4.8)
ALBUMIN SERPL BCP-MCNC: 3.6 G/DL (ref 3.5–4.8)
ALBUMIN/GLOB SERPL: 0.8 {RATIO} (ref 1–2)
ALBUMIN/GLOB SERPL: 0.9 {RATIO} (ref 1–2)
ALBUMIN/GLOB SERPL: 1 {RATIO} (ref 1–2)
ALBUMIN/GLOB SERPL: 1 {RATIO} (ref 1–2)
ALBUMIN/GLOB SERPL: 1.1 {RATIO} (ref 1–2)
ALBUMIN/GLOB SERPL: 1.1 {RATIO} (ref 1–2)
ALP SERPL-CCNC: 62 U/L (ref 32–100)
ALP SERPL-CCNC: 67 U/L (ref 32–100)
ALP SERPL-CCNC: 67 U/L (ref 32–100)
ALP SERPL-CCNC: 70 U/L (ref 32–100)
ALP SERPL-CCNC: 71 U/L (ref 32–100)
ALP SERPL-CCNC: 86 U/L (ref 32–100)
ALP SERPL-CCNC: 92 U/L (ref 32–100)
ALT SERPL-CCNC: 12 U/L (ref 14–54)
ALT SERPL-CCNC: 13 U/L (ref 14–54)
ALT SERPL-CCNC: 13 U/L (ref 14–54)
ALT SERPL-CCNC: 16 U/L (ref 14–54)
ALT SERPL-CCNC: 22 U/L (ref 14–54)
ALT SERPL-CCNC: 9 U/L (ref 14–54)
ALT SERPL-CCNC: 9 U/L (ref 14–54)
ANION GAP SERPL CALC-SCNC: 10 MMOL/L (ref 0–18)
ANION GAP SERPL CALC-SCNC: 11 MMOL/L (ref 0–18)
ANION GAP SERPL CALC-SCNC: 12 MMOL/L (ref 0–18)
ANION GAP SERPL CALC-SCNC: 13 MMOL/L (ref 0–18)
ANION GAP SERPL CALC-SCNC: 13 MMOL/L (ref 0–18)
ANION GAP SERPL CALC-SCNC: 5 MMOL/L (ref 0–18)
ANION GAP SERPL CALC-SCNC: 8 MMOL/L (ref 0–18)
ANION GAP SERPL CALC-SCNC: 9 MMOL/L (ref 0–18)
ANTIBODY SCREEN: NEGATIVE
AST SERPL-CCNC: 21 U/L (ref 15–41)
AST SERPL-CCNC: 21 U/L (ref 15–41)
AST SERPL-CCNC: 24 U/L (ref 15–41)
AST SERPL-CCNC: 26 U/L (ref 15–41)
AST SERPL-CCNC: 31 U/L (ref 15–41)
B PERT DNA SPEC QL NAA+PROBE: NEGATIVE
BACTERIA UR QL AUTO: NEGATIVE /HPF
BASOPHILS # BLD: 0 K/UL (ref 0–0.2)
BASOPHILS # BLD: 0.1 K/UL (ref 0–0.2)
BASOPHILS NFR BLD: 0 %
BASOPHILS NFR BLD: 1 %
BILIRUB DIRECT SERPL-MCNC: 0.2 MG/DL (ref 0–0.2)
BILIRUB SERPL-MCNC: 0.5 MG/DL (ref 0.3–1.2)
BILIRUB SERPL-MCNC: 0.7 MG/DL (ref 0.3–1.2)
BILIRUB UR QL: NEGATIVE
BLOOD TYPE BARCODE: 6200
BNP SERPL-MCNC: 144 PG/ML (ref 0–100)
BNP SERPL-MCNC: 217 PG/ML (ref 0–100)
BUN SERPL-MCNC: 14 MG/DL (ref 8–20)
BUN SERPL-MCNC: 15 MG/DL (ref 8–20)
BUN SERPL-MCNC: 16 MG/DL (ref 8–20)
BUN SERPL-MCNC: 17 MG/DL (ref 8–20)
BUN SERPL-MCNC: 17 MG/DL (ref 8–20)
BUN SERPL-MCNC: 19 MG/DL (ref 8–20)
BUN SERPL-MCNC: 20 MG/DL (ref 8–20)
BUN SERPL-MCNC: 21 MG/DL (ref 8–20)
BUN SERPL-MCNC: 22 MG/DL (ref 8–20)
BUN SERPL-MCNC: 23 MG/DL (ref 8–20)
BUN SERPL-MCNC: 23 MG/DL (ref 8–20)
BUN SERPL-MCNC: 24 MG/DL (ref 8–20)
BUN SERPL-MCNC: 30 MG/DL (ref 8–20)
BUN SERPL-MCNC: 31 MG/DL (ref 8–20)
BUN SERPL-MCNC: 33 MG/DL (ref 8–20)
BUN SERPL-MCNC: 34 MG/DL (ref 8–20)
BUN SERPL-MCNC: 37 MG/DL (ref 8–20)
BUN SERPL-MCNC: 38 MG/DL (ref 8–20)
BUN SERPL-MCNC: 39 MG/DL (ref 8–20)
BUN SERPL-MCNC: 39 MG/DL (ref 8–20)
BUN/CREAT SERPL: 12.7 (ref 10–20)
BUN/CREAT SERPL: 12.8 (ref 10–20)
BUN/CREAT SERPL: 13.5 (ref 10–20)
BUN/CREAT SERPL: 14.4 (ref 10–20)
BUN/CREAT SERPL: 14.6 (ref 10–20)
BUN/CREAT SERPL: 14.6 (ref 10–20)
BUN/CREAT SERPL: 16 (ref 10–20)
BUN/CREAT SERPL: 16.3 (ref 10–20)
BUN/CREAT SERPL: 16.7 (ref 10–20)
BUN/CREAT SERPL: 18.7 (ref 10–20)
BUN/CREAT SERPL: 19.2 (ref 10–20)
BUN/CREAT SERPL: 22.2 (ref 10–20)
BUN/CREAT SERPL: 23.3 (ref 10–20)
BUN/CREAT SERPL: 23.6 (ref 10–20)
BUN/CREAT SERPL: 23.9 (ref 10–20)
BUN/CREAT SERPL: 27.9 (ref 10–20)
BUN/CREAT SERPL: 35.6 (ref 10–20)
BUN/CREAT SERPL: 40.4 (ref 10–20)
BUN/CREAT SERPL: 41.5 (ref 10–20)
BUN/CREAT SERPL: 44.8 (ref 10–20)
C PNEUM DNA SPEC QL NAA+PROBE: NEGATIVE
CALCIUM SERPL-MCNC: 8 MG/DL (ref 8.5–10.5)
CALCIUM SERPL-MCNC: 8.1 MG/DL (ref 8.5–10.5)
CALCIUM SERPL-MCNC: 8.2 MG/DL (ref 8.5–10.5)
CALCIUM SERPL-MCNC: 8.2 MG/DL (ref 8.5–10.5)
CALCIUM SERPL-MCNC: 8.4 MG/DL (ref 8.5–10.5)
CALCIUM SERPL-MCNC: 8.5 MG/DL (ref 8.5–10.5)
CALCIUM SERPL-MCNC: 8.6 MG/DL (ref 8.5–10.5)
CALCIUM SERPL-MCNC: 8.6 MG/DL (ref 8.5–10.5)
CALCIUM SERPL-MCNC: 8.8 MG/DL (ref 8.5–10.5)
CALCIUM SERPL-MCNC: 8.8 MG/DL (ref 8.5–10.5)
CALCIUM SERPL-MCNC: 8.9 MG/DL (ref 8.5–10.5)
CALCIUM SERPL-MCNC: 8.9 MG/DL (ref 8.5–10.5)
CALCIUM SERPL-MCNC: 9 MG/DL (ref 8.5–10.5)
CALCIUM SERPL-MCNC: 9 MG/DL (ref 8.5–10.5)
CALCIUM SERPL-MCNC: 9.1 MG/DL (ref 8.5–10.5)
CALCIUM SERPL-MCNC: 9.2 MG/DL (ref 8.5–10.5)
CALCIUM SERPL-MCNC: 9.3 MG/DL (ref 8.5–10.5)
CALCIUM SERPL-MCNC: 9.4 MG/DL (ref 8.5–10.5)
CHLORIDE SERPL-SCNC: 88 MMOL/L (ref 95–110)
CHLORIDE SERPL-SCNC: 90 MMOL/L (ref 95–110)
CHLORIDE SERPL-SCNC: 90 MMOL/L (ref 95–110)
CHLORIDE SERPL-SCNC: 91 MMOL/L (ref 95–110)
CHLORIDE SERPL-SCNC: 92 MMOL/L (ref 95–110)
CHLORIDE SERPL-SCNC: 93 MMOL/L (ref 95–110)
CHLORIDE SERPL-SCNC: 95 MMOL/L (ref 95–110)
CHLORIDE SERPL-SCNC: 96 MMOL/L (ref 95–110)
CHLORIDE SERPL-SCNC: 96 MMOL/L (ref 95–110)
CHLORIDE SERPL-SCNC: 97 MMOL/L (ref 95–110)
CHLORIDE SERPL-SCNC: 98 MMOL/L (ref 95–110)
CHLORIDE SERPL-SCNC: 99 MMOL/L (ref 95–110)
CHLORIDE SERPL-SCNC: 99 MMOL/L (ref 95–110)
CLARITY UR: CLEAR
CO2 SERPL-SCNC: 30 MMOL/L (ref 22–32)
CO2 SERPL-SCNC: 30 MMOL/L (ref 22–32)
CO2 SERPL-SCNC: 31 MMOL/L (ref 22–32)
CO2 SERPL-SCNC: 32 MMOL/L (ref 22–32)
CO2 SERPL-SCNC: 33 MMOL/L (ref 22–32)
CO2 SERPL-SCNC: 33 MMOL/L (ref 22–32)
CO2 SERPL-SCNC: 34 MMOL/L (ref 22–32)
CO2 SERPL-SCNC: 35 MMOL/L (ref 22–32)
CO2 SERPL-SCNC: 35 MMOL/L (ref 22–32)
CO2 SERPL-SCNC: 36 MMOL/L (ref 22–32)
CO2 SERPL-SCNC: 38 MMOL/L (ref 22–32)
CO2 SERPL-SCNC: 39 MMOL/L (ref 22–32)
COLOR UR: YELLOW
CORONAVIRUS 229E PCR:: NEGATIVE
CORONAVIRUS HKU1 PCR:: NEGATIVE
CORONAVIRUS NL63 PCR:: NEGATIVE
CORONAVIRUS OC43 PCR:: NEGATIVE
CREAT SERPL-MCNC: 0.87 MG/DL (ref 0.5–1.5)
CREAT SERPL-MCNC: 0.88 MG/DL (ref 0.5–1.5)
CREAT SERPL-MCNC: 0.94 MG/DL (ref 0.5–1.5)
CREAT SERPL-MCNC: 0.94 MG/DL (ref 0.5–1.5)
CREAT SERPL-MCNC: 1.02 MG/DL (ref 0.5–1.5)
CREAT SERPL-MCNC: 1.03 MG/DL (ref 0.5–1.5)
CREAT SERPL-MCNC: 1.04 MG/DL (ref 0.5–1.5)
CREAT SERPL-MCNC: 1.08 MG/DL (ref 0.5–1.5)
CREAT SERPL-MCNC: 1.1 MG/DL (ref 0.5–1.5)
CREAT SERPL-MCNC: 1.11 MG/DL (ref 0.5–1.5)
CREAT SERPL-MCNC: 1.22 MG/DL (ref 0.5–1.5)
CREAT SERPL-MCNC: 1.23 MG/DL (ref 0.5–1.5)
CREAT SERPL-MCNC: 1.27 MG/DL (ref 0.5–1.5)
CREAT SERPL-MCNC: 1.33 MG/DL (ref 0.5–1.5)
CREAT SERPL-MCNC: 1.49 MG/DL (ref 0.5–1.5)
CREAT SERPL-MCNC: 1.51 MG/DL (ref 0.5–1.5)
CREAT SERPL-MCNC: 1.71 MG/DL (ref 0.5–1.5)
CREAT SERPL-MCNC: 2.06 MG/DL (ref 0.5–1.5)
EOSINOPHIL # BLD: 0 K/UL (ref 0–0.7)
EOSINOPHIL # BLD: 0.2 K/UL (ref 0–0.7)
EOSINOPHIL # BLD: 0.3 K/UL (ref 0–0.7)
EOSINOPHIL # BLD: 0.3 K/UL (ref 0–0.7)
EOSINOPHIL # BLD: 0.4 K/UL (ref 0–0.7)
EOSINOPHIL # BLD: 0.4 K/UL (ref 0–0.7)
EOSINOPHIL # BLD: 0.5 K/UL (ref 0–0.7)
EOSINOPHIL # BLD: 0.5 K/UL (ref 0–0.7)
EOSINOPHIL # BLD: 0.7 K/UL (ref 0–0.7)
EOSINOPHIL NFR BLD: 0 %
EOSINOPHIL NFR BLD: 2 %
EOSINOPHIL NFR BLD: 3 %
EOSINOPHIL NFR BLD: 4 %
EOSINOPHIL NFR BLD: 5 %
EOSINOPHIL NFR BLD: 6 %
EOSINOPHIL NFR BLD: 9 %
ERYTHROCYTE [DISTWIDTH] IN BLOOD BY AUTOMATED COUNT: 14.4 % (ref 11–15)
ERYTHROCYTE [DISTWIDTH] IN BLOOD BY AUTOMATED COUNT: 14.5 % (ref 11–15)
ERYTHROCYTE [DISTWIDTH] IN BLOOD BY AUTOMATED COUNT: 14.6 % (ref 11–15)
ERYTHROCYTE [DISTWIDTH] IN BLOOD BY AUTOMATED COUNT: 14.6 % (ref 11–15)
ERYTHROCYTE [DISTWIDTH] IN BLOOD BY AUTOMATED COUNT: 14.7 % (ref 11–15)
ERYTHROCYTE [DISTWIDTH] IN BLOOD BY AUTOMATED COUNT: 14.9 % (ref 11–15)
ERYTHROCYTE [DISTWIDTH] IN BLOOD BY AUTOMATED COUNT: 15.2 % (ref 11–15)
ERYTHROCYTE [DISTWIDTH] IN BLOOD BY AUTOMATED COUNT: 15.2 % (ref 11–15)
ERYTHROCYTE [DISTWIDTH] IN BLOOD BY AUTOMATED COUNT: 15.3 % (ref 11–15)
ERYTHROCYTE [DISTWIDTH] IN BLOOD BY AUTOMATED COUNT: 15.5 % (ref 11–15)
ERYTHROCYTE [DISTWIDTH] IN BLOOD BY AUTOMATED COUNT: 15.7 % (ref 11–15)
ERYTHROCYTE [DISTWIDTH] IN BLOOD BY AUTOMATED COUNT: 15.9 % (ref 11–15)
ERYTHROCYTE [DISTWIDTH] IN BLOOD BY AUTOMATED COUNT: 16.4 % (ref 11–15)
ERYTHROCYTE [DISTWIDTH] IN BLOOD BY AUTOMATED COUNT: 16.6 % (ref 11–15)
ERYTHROCYTE [DISTWIDTH] IN BLOOD BY AUTOMATED COUNT: 16.9 % (ref 11–15)
ERYTHROCYTE [DISTWIDTH] IN BLOOD BY AUTOMATED COUNT: 18 % (ref 11–15)
FERRITIN SERPL IA-MCNC: 41 NG/ML (ref 11–307)
FLUAV H3 RNA SPEC QL NAA+PROBE: POSITIVE
FLUBV RNA SPEC QL NAA+PROBE: NEGATIVE
GLOBULIN PLAS-MCNC: 2.6 G/DL (ref 2.5–3.7)
GLOBULIN PLAS-MCNC: 2.7 G/DL (ref 2.5–3.7)
GLOBULIN PLAS-MCNC: 2.8 G/DL (ref 2.5–3.7)
GLOBULIN PLAS-MCNC: 3.4 G/DL (ref 2.5–3.7)
GLOBULIN PLAS-MCNC: 3.4 G/DL (ref 2.5–3.7)
GLOBULIN PLAS-MCNC: 4.1 G/DL (ref 2.5–3.7)
GLUCOSE SERPL-MCNC: 102 MG/DL (ref 70–99)
GLUCOSE SERPL-MCNC: 106 MG/DL (ref 70–99)
GLUCOSE SERPL-MCNC: 107 MG/DL (ref 70–99)
GLUCOSE SERPL-MCNC: 108 MG/DL (ref 70–99)
GLUCOSE SERPL-MCNC: 108 MG/DL (ref 70–99)
GLUCOSE SERPL-MCNC: 111 MG/DL (ref 70–99)
GLUCOSE SERPL-MCNC: 112 MG/DL (ref 70–99)
GLUCOSE SERPL-MCNC: 115 MG/DL (ref 70–99)
GLUCOSE SERPL-MCNC: 115 MG/DL (ref 70–99)
GLUCOSE SERPL-MCNC: 118 MG/DL (ref 70–99)
GLUCOSE SERPL-MCNC: 120 MG/DL (ref 70–99)
GLUCOSE SERPL-MCNC: 124 MG/DL (ref 70–99)
GLUCOSE SERPL-MCNC: 127 MG/DL (ref 70–99)
GLUCOSE SERPL-MCNC: 132 MG/DL (ref 70–99)
GLUCOSE SERPL-MCNC: 155 MG/DL (ref 70–99)
GLUCOSE SERPL-MCNC: 171 MG/DL (ref 70–99)
GLUCOSE SERPL-MCNC: 193 MG/DL (ref 70–99)
GLUCOSE SERPL-MCNC: 86 MG/DL (ref 70–99)
GLUCOSE SERPL-MCNC: 96 MG/DL (ref 70–99)
GLUCOSE SERPL-MCNC: 99 MG/DL (ref 70–99)
GLUCOSE UR-MCNC: NEGATIVE MG/DL
HCT VFR BLD AUTO: 18.7 % (ref 35–48)
HCT VFR BLD AUTO: 19 % (ref 35–48)
HCT VFR BLD AUTO: 20 % (ref 35–48)
HCT VFR BLD AUTO: 20.3 % (ref 35–48)
HCT VFR BLD AUTO: 22.6 % (ref 35–48)
HCT VFR BLD AUTO: 26.8 % (ref 35–48)
HCT VFR BLD AUTO: 27 % (ref 35–48)
HCT VFR BLD AUTO: 27.7 % (ref 35–48)
HCT VFR BLD AUTO: 27.9 % (ref 35–48)
HCT VFR BLD AUTO: 28.4 % (ref 35–48)
HCT VFR BLD AUTO: 28.4 % (ref 35–48)
HCT VFR BLD AUTO: 28.9 % (ref 35–48)
HCT VFR BLD AUTO: 30.1 % (ref 35–48)
HCT VFR BLD AUTO: 31.2 % (ref 35–48)
HCT VFR BLD AUTO: 31.4 % (ref 35–48)
HCT VFR BLD AUTO: 31.9 % (ref 35–48)
HCT VFR BLD AUTO: 32.6 % (ref 35–48)
HCT VFR BLD AUTO: 34 % (ref 35–48)
HCT VFR BLD AUTO: 35.8 % (ref 35–48)
HCT VFR BLD AUTO: 37.8 % (ref 35–48)
HEMOCCULT STL QL: POSITIVE
HGB BLD-MCNC: 10.1 G/DL (ref 12–16)
HGB BLD-MCNC: 10.4 G/DL (ref 12–16)
HGB BLD-MCNC: 10.5 G/DL (ref 12–16)
HGB BLD-MCNC: 11 G/DL (ref 12–16)
HGB BLD-MCNC: 11.4 G/DL (ref 12–16)
HGB BLD-MCNC: 12.2 G/DL (ref 12–16)
HGB BLD-MCNC: 5.8 G/DL (ref 12–16)
HGB BLD-MCNC: 6.1 G/DL (ref 12–16)
HGB BLD-MCNC: 6.4 G/DL (ref 12–16)
HGB BLD-MCNC: 6.7 G/DL (ref 12–16)
HGB BLD-MCNC: 7.1 G/DL (ref 12–16)
HGB BLD-MCNC: 8.5 G/DL (ref 12–16)
HGB BLD-MCNC: 8.5 G/DL (ref 12–16)
HGB BLD-MCNC: 8.8 G/DL (ref 12–16)
HGB BLD-MCNC: 8.9 G/DL (ref 12–16)
HGB BLD-MCNC: 9 G/DL (ref 12–16)
HGB BLD-MCNC: 9.1 G/DL (ref 12–16)
HGB BLD-MCNC: 9.2 G/DL (ref 12–16)
HGB BLD-MCNC: 9.2 G/DL (ref 12–16)
HGB BLD-MCNC: 9.3 G/DL (ref 12–16)
HGB BLD-MCNC: 9.4 G/DL (ref 12–16)
HGB BLD-MCNC: 9.4 G/DL (ref 12–16)
HGB BLD-MCNC: 9.5 G/DL (ref 12–16)
HGB BLD-MCNC: 9.8 G/DL (ref 12–16)
HGB BLD-MCNC: 9.8 G/DL (ref 12–16)
HGB BLD-MCNC: 9.9 G/DL (ref 12–16)
HGB UR QL STRIP.AUTO: NEGATIVE
HYALINE CASTS #/AREA URNS AUTO: 50 /LPF
IRON SATN MFR SERPL: 5 % (ref 15–50)
IRON SERPL-MCNC: 15 MCG/DL (ref 28–170)
KETONES UR-MCNC: NEGATIVE MG/DL
LEUKOCYTE ESTERASE UR QL STRIP.AUTO: NEGATIVE
LEUKOCYTE ESTERASE UR QL STRIP.AUTO: NEGATIVE
LYMPHOCYTES # BLD: 0.5 K/UL (ref 1–4)
LYMPHOCYTES # BLD: 0.5 K/UL (ref 1–4)
LYMPHOCYTES # BLD: 0.8 K/UL (ref 1–4)
LYMPHOCYTES # BLD: 1.2 K/UL (ref 1–4)
LYMPHOCYTES # BLD: 1.3 K/UL (ref 1–4)
LYMPHOCYTES # BLD: 1.6 K/UL (ref 1–4)
LYMPHOCYTES # BLD: 1.7 K/UL (ref 1–4)
LYMPHOCYTES # BLD: 1.7 K/UL (ref 1–4)
LYMPHOCYTES # BLD: 1.8 K/UL (ref 1–4)
LYMPHOCYTES # BLD: 1.9 K/UL (ref 1–4)
LYMPHOCYTES # BLD: 2.1 K/UL (ref 1–4)
LYMPHOCYTES # BLD: 2.3 K/UL (ref 1–4)
LYMPHOCYTES # BLD: 2.3 K/UL (ref 1–4)
LYMPHOCYTES # BLD: 2.4 K/UL (ref 1–4)
LYMPHOCYTES # BLD: 2.8 K/UL (ref 1–4)
LYMPHOCYTES NFR BLD: 11 %
LYMPHOCYTES NFR BLD: 13 %
LYMPHOCYTES NFR BLD: 14 %
LYMPHOCYTES NFR BLD: 14 %
LYMPHOCYTES NFR BLD: 18 %
LYMPHOCYTES NFR BLD: 19 %
LYMPHOCYTES NFR BLD: 20 %
LYMPHOCYTES NFR BLD: 21 %
LYMPHOCYTES NFR BLD: 21 %
LYMPHOCYTES NFR BLD: 23 %
LYMPHOCYTES NFR BLD: 27 %
LYMPHOCYTES NFR BLD: 28 %
LYMPHOCYTES NFR BLD: 5 %
LYMPHOCYTES NFR BLD: 7 %
LYMPHOCYTES NFR BLD: 8 %
MAGNESIUM SERPL-MCNC: 1.9 MG/DL (ref 1.8–2.5)
MAGNESIUM SERPL-MCNC: 2 MG/DL (ref 1.8–2.5)
MAGNESIUM SERPL-MCNC: 2 MG/DL (ref 1.8–2.5)
MAGNESIUM SERPL-MCNC: 2.1 MG/DL (ref 1.8–2.5)
MAGNESIUM SERPL-MCNC: 2.4 MG/DL (ref 1.8–2.5)
MCH RBC QN AUTO: 27.4 PG (ref 27–32)
MCH RBC QN AUTO: 27.5 PG (ref 27–32)
MCH RBC QN AUTO: 27.5 PG (ref 27–32)
MCH RBC QN AUTO: 27.6 PG (ref 27–32)
MCH RBC QN AUTO: 28.1 PG (ref 27–32)
MCH RBC QN AUTO: 28.3 PG (ref 27–32)
MCH RBC QN AUTO: 28.3 PG (ref 27–32)
MCH RBC QN AUTO: 28.4 PG (ref 27–32)
MCH RBC QN AUTO: 28.4 PG (ref 27–32)
MCH RBC QN AUTO: 28.6 PG (ref 27–32)
MCH RBC QN AUTO: 28.6 PG (ref 27–32)
MCH RBC QN AUTO: 28.7 PG (ref 27–32)
MCH RBC QN AUTO: 28.7 PG (ref 27–32)
MCH RBC QN AUTO: 28.8 PG (ref 27–32)
MCH RBC QN AUTO: 28.8 PG (ref 27–32)
MCH RBC QN AUTO: 29.1 PG (ref 27–32)
MCH RBC QN AUTO: 29.1 PG (ref 27–32)
MCH RBC QN AUTO: 29.4 PG (ref 27–32)
MCHC RBC AUTO-ENTMCNC: 30.9 G/DL (ref 32–37)
MCHC RBC AUTO-ENTMCNC: 31.1 G/DL (ref 32–37)
MCHC RBC AUTO-ENTMCNC: 31.3 G/DL (ref 32–37)
MCHC RBC AUTO-ENTMCNC: 31.3 G/DL (ref 32–37)
MCHC RBC AUTO-ENTMCNC: 31.4 G/DL (ref 32–37)
MCHC RBC AUTO-ENTMCNC: 31.8 G/DL (ref 32–37)
MCHC RBC AUTO-ENTMCNC: 31.8 G/DL (ref 32–37)
MCHC RBC AUTO-ENTMCNC: 31.9 G/DL (ref 32–37)
MCHC RBC AUTO-ENTMCNC: 32.1 G/DL (ref 32–37)
MCHC RBC AUTO-ENTMCNC: 32.3 G/DL (ref 32–37)
MCHC RBC AUTO-ENTMCNC: 32.3 G/DL (ref 32–37)
MCHC RBC AUTO-ENTMCNC: 32.4 G/DL (ref 32–37)
MCHC RBC AUTO-ENTMCNC: 32.5 G/DL (ref 32–37)
MCHC RBC AUTO-ENTMCNC: 32.5 G/DL (ref 32–37)
MCHC RBC AUTO-ENTMCNC: 32.7 G/DL (ref 32–37)
MCHC RBC AUTO-ENTMCNC: 32.7 G/DL (ref 32–37)
MCHC RBC AUTO-ENTMCNC: 33.2 G/DL (ref 32–37)
MCHC RBC AUTO-ENTMCNC: 33.6 G/DL (ref 32–37)
MCV RBC AUTO: 85.3 FL (ref 80–100)
MCV RBC AUTO: 86.6 FL (ref 80–100)
MCV RBC AUTO: 87.3 FL (ref 80–100)
MCV RBC AUTO: 87.6 FL (ref 80–100)
MCV RBC AUTO: 87.7 FL (ref 80–100)
MCV RBC AUTO: 87.8 FL (ref 80–100)
MCV RBC AUTO: 88 FL (ref 80–100)
MCV RBC AUTO: 88.5 FL (ref 80–100)
MCV RBC AUTO: 88.6 FL (ref 80–100)
MCV RBC AUTO: 88.8 FL (ref 80–100)
MCV RBC AUTO: 88.8 FL (ref 80–100)
MCV RBC AUTO: 89.8 FL (ref 80–100)
MCV RBC AUTO: 90.1 FL (ref 80–100)
MCV RBC AUTO: 90.2 FL (ref 80–100)
MCV RBC AUTO: 90.3 FL (ref 80–100)
MCV RBC AUTO: 90.3 FL (ref 80–100)
METAPNEUMOVIRUS PCR:: NEGATIVE
MONOCYTES # BLD: 0.1 K/UL (ref 0–1)
MONOCYTES # BLD: 0.2 K/UL (ref 0–1)
MONOCYTES # BLD: 0.2 K/UL (ref 0–1)
MONOCYTES # BLD: 0.3 K/UL (ref 0–1)
MONOCYTES # BLD: 0.7 K/UL (ref 0–1)
MONOCYTES # BLD: 0.7 K/UL (ref 0–1)
MONOCYTES # BLD: 0.8 K/UL (ref 0–1)
MONOCYTES # BLD: 0.9 K/UL (ref 0–1)
MONOCYTES # BLD: 0.9 K/UL (ref 0–1)
MONOCYTES # BLD: 1 K/UL (ref 0–1)
MONOCYTES # BLD: 1.3 K/UL (ref 0–1)
MONOCYTES NFR BLD: 1 %
MONOCYTES NFR BLD: 10 %
MONOCYTES NFR BLD: 10 %
MONOCYTES NFR BLD: 12 %
MONOCYTES NFR BLD: 17 %
MONOCYTES NFR BLD: 2 %
MONOCYTES NFR BLD: 2 %
MONOCYTES NFR BLD: 3 %
MONOCYTES NFR BLD: 7 %
MONOCYTES NFR BLD: 8 %
MONOCYTES NFR BLD: 8 %
MONOCYTES NFR BLD: 9 %
MONOCYTES NFR BLD: 9 %
MRSA DNA SPEC QL NAA+PROBE: NEGATIVE
MYCOPLASMA PNEUMONIA PCR:: NEGATIVE
NEUTROPHILS # BLD AUTO: 3.7 K/UL (ref 1.8–7.7)
NEUTROPHILS # BLD AUTO: 4.6 K/UL (ref 1.8–7.7)
NEUTROPHILS # BLD AUTO: 4.9 K/UL (ref 1.8–7.7)
NEUTROPHILS # BLD AUTO: 5.2 K/UL (ref 1.8–7.7)
NEUTROPHILS # BLD AUTO: 5.7 K/UL (ref 1.8–7.7)
NEUTROPHILS # BLD AUTO: 7.5 K/UL (ref 1.8–7.7)
NEUTROPHILS # BLD AUTO: 7.9 K/UL (ref 1.8–7.7)
NEUTROPHILS # BLD AUTO: 8.3 K/UL (ref 1.8–7.7)
NEUTROPHILS # BLD AUTO: 8.5 K/UL (ref 1.8–7.7)
NEUTROPHILS # BLD AUTO: 8.6 K/UL (ref 1.8–7.7)
NEUTROPHILS # BLD AUTO: 8.8 K/UL (ref 1.8–7.7)
NEUTROPHILS # BLD AUTO: 8.9 K/UL (ref 1.8–7.7)
NEUTROPHILS # BLD AUTO: 9.1 K/UL (ref 1.8–7.7)
NEUTROPHILS # BLD AUTO: 9.2 K/UL (ref 1.8–7.7)
NEUTROPHILS # BLD AUTO: 9.9 K/UL (ref 1.8–7.7)
NEUTROPHILS NFR BLD: 47 %
NEUTROPHILS NFR BLD: 59 %
NEUTROPHILS NFR BLD: 59 %
NEUTROPHILS NFR BLD: 64 %
NEUTROPHILS NFR BLD: 68 %
NEUTROPHILS NFR BLD: 71 %
NEUTROPHILS NFR BLD: 74 %
NEUTROPHILS NFR BLD: 74 %
NEUTROPHILS NFR BLD: 76 %
NEUTROPHILS NFR BLD: 77 %
NEUTROPHILS NFR BLD: 90 %
NEUTROPHILS NFR BLD: 90 %
NEUTROPHILS NFR BLD: 93 %
NITRITE UR QL STRIP.AUTO: NEGATIVE
OSMOLALITY UR CALC.SUM OF ELEC: 284 MOSM/KG (ref 275–295)
OSMOLALITY UR CALC.SUM OF ELEC: 285 MOSM/KG (ref 275–295)
OSMOLALITY UR CALC.SUM OF ELEC: 286 MOSM/KG (ref 275–295)
OSMOLALITY UR CALC.SUM OF ELEC: 286 MOSM/KG (ref 275–295)
OSMOLALITY UR CALC.SUM OF ELEC: 288 MOSM/KG (ref 275–295)
OSMOLALITY UR CALC.SUM OF ELEC: 289 MOSM/KG (ref 275–295)
OSMOLALITY UR CALC.SUM OF ELEC: 289 MOSM/KG (ref 275–295)
OSMOLALITY UR CALC.SUM OF ELEC: 291 MOSM/KG (ref 275–295)
OSMOLALITY UR CALC.SUM OF ELEC: 292 MOSM/KG (ref 275–295)
OSMOLALITY UR CALC.SUM OF ELEC: 299 MOSM/KG (ref 275–295)
OSMOLALITY UR CALC.SUM OF ELEC: 300 MOSM/KG (ref 275–295)
OSMOLALITY UR CALC.SUM OF ELEC: 300 MOSM/KG (ref 275–295)
OSMOLALITY UR CALC.SUM OF ELEC: 303 MOSM/KG (ref 275–295)
OSMOLALITY UR CALC.SUM OF ELEC: 312 MOSM/KG (ref 275–295)
PARAINFLUENZA 1 PCR:: NEGATIVE
PARAINFLUENZA 2 PCR:: NEGATIVE
PARAINFLUENZA 3 PCR:: NEGATIVE
PARAINFLUENZA 4 PCR:: NEGATIVE
PH UR: 6 [PH] (ref 5–8)
PH UR: 7 [PH] (ref 5–8)
PLATELET # BLD AUTO: 198 K/UL (ref 140–400)
PLATELET # BLD AUTO: 208 K/UL (ref 140–400)
PLATELET # BLD AUTO: 218 K/UL (ref 140–400)
PLATELET # BLD AUTO: 227 K/UL (ref 140–400)
PLATELET # BLD AUTO: 230 K/UL (ref 140–400)
PLATELET # BLD AUTO: 242 K/UL (ref 140–400)
PLATELET # BLD AUTO: 247 K/UL (ref 140–400)
PLATELET # BLD AUTO: 257 K/UL (ref 140–400)
PLATELET # BLD AUTO: 269 K/UL (ref 140–400)
PLATELET # BLD AUTO: 269 K/UL (ref 140–400)
PLATELET # BLD AUTO: 273 K/UL (ref 140–400)
PLATELET # BLD AUTO: 283 K/UL (ref 140–400)
PLATELET # BLD AUTO: 284 K/UL (ref 140–400)
PLATELET # BLD AUTO: 290 K/UL (ref 140–400)
PLATELET # BLD AUTO: 305 K/UL (ref 140–400)
PLATELET # BLD AUTO: 306 K/UL (ref 140–400)
PLATELET # BLD AUTO: 376 K/UL (ref 140–400)
PLATELET # BLD AUTO: 398 K/UL (ref 140–400)
PMV BLD AUTO: 6.3 FL (ref 7.4–10.3)
PMV BLD AUTO: 6.3 FL (ref 7.4–10.3)
PMV BLD AUTO: 6.4 FL (ref 7.4–10.3)
PMV BLD AUTO: 6.6 FL (ref 7.4–10.3)
PMV BLD AUTO: 6.7 FL (ref 7.4–10.3)
PMV BLD AUTO: 6.8 FL (ref 7.4–10.3)
PMV BLD AUTO: 6.8 FL (ref 7.4–10.3)
PMV BLD AUTO: 7.1 FL (ref 7.4–10.3)
PMV BLD AUTO: 7.2 FL (ref 7.4–10.3)
PMV BLD AUTO: 7.2 FL (ref 7.4–10.3)
PMV BLD AUTO: 7.3 FL (ref 7.4–10.3)
PMV BLD AUTO: 7.3 FL (ref 7.4–10.3)
POTASSIUM SERPL-SCNC: 2.6 MMOL/L (ref 3.3–5.1)
POTASSIUM SERPL-SCNC: 2.7 MMOL/L (ref 3.3–5.1)
POTASSIUM SERPL-SCNC: 2.8 MMOL/L (ref 3.3–5.1)
POTASSIUM SERPL-SCNC: 3.2 MMOL/L (ref 3.3–5.1)
POTASSIUM SERPL-SCNC: 3.3 MMOL/L (ref 3.3–5.1)
POTASSIUM SERPL-SCNC: 3.5 MMOL/L (ref 3.3–5.1)
POTASSIUM SERPL-SCNC: 3.5 MMOL/L (ref 3.3–5.1)
POTASSIUM SERPL-SCNC: 3.6 MMOL/L (ref 3.3–5.1)
POTASSIUM SERPL-SCNC: 3.7 MMOL/L (ref 3.3–5.1)
POTASSIUM SERPL-SCNC: 3.8 MMOL/L (ref 3.3–5.1)
POTASSIUM SERPL-SCNC: 3.8 MMOL/L (ref 3.3–5.1)
POTASSIUM SERPL-SCNC: 3.9 MMOL/L (ref 3.3–5.1)
POTASSIUM SERPL-SCNC: 4 MMOL/L (ref 3.3–5.1)
POTASSIUM SERPL-SCNC: 4.2 MMOL/L (ref 3.3–5.1)
POTASSIUM SERPL-SCNC: 4.3 MMOL/L (ref 3.3–5.1)
POTASSIUM SERPL-SCNC: 4.6 MMOL/L (ref 3.3–5.1)
POTASSIUM SERPL-SCNC: 4.6 MMOL/L (ref 3.3–5.1)
POTASSIUM SERPL-SCNC: 5 MMOL/L (ref 3.3–5.1)
PROT SERPL-MCNC: 5.3 G/DL (ref 5.9–8.4)
PROT SERPL-MCNC: 5.4 G/DL (ref 5.9–8.4)
PROT SERPL-MCNC: 5.6 G/DL (ref 5.9–8.4)
PROT SERPL-MCNC: 6.7 G/DL (ref 5.9–8.4)
PROT SERPL-MCNC: 7 G/DL (ref 5.9–8.4)
PROT SERPL-MCNC: 7.4 G/DL (ref 5.9–8.4)
PROT SERPL-MCNC: 7.5 G/DL (ref 5.9–8.4)
PROT UR-MCNC: NEGATIVE MG/DL
RBC # BLD AUTO: 2.1 M/UL (ref 3.7–5.4)
RBC # BLD AUTO: 2.15 M/UL (ref 3.7–5.4)
RBC # BLD AUTO: 2.27 M/UL (ref 3.7–5.4)
RBC # BLD AUTO: 2.28 M/UL (ref 3.7–5.4)
RBC # BLD AUTO: 2.97 M/UL (ref 3.7–5.4)
RBC # BLD AUTO: 3.04 M/UL (ref 3.7–5.4)
RBC # BLD AUTO: 3.13 M/UL (ref 3.7–5.4)
RBC # BLD AUTO: 3.14 M/UL (ref 3.7–5.4)
RBC # BLD AUTO: 3.24 M/UL (ref 3.7–5.4)
RBC # BLD AUTO: 3.29 M/UL (ref 3.7–5.4)
RBC # BLD AUTO: 3.42 M/UL (ref 3.7–5.4)
RBC # BLD AUTO: 3.46 M/UL (ref 3.7–5.4)
RBC # BLD AUTO: 3.55 M/UL (ref 3.7–5.4)
RBC # BLD AUTO: 3.6 M/UL (ref 3.7–5.4)
RBC # BLD AUTO: 3.62 M/UL (ref 3.7–5.4)
RBC # BLD AUTO: 3.98 M/UL (ref 3.7–5.4)
RBC # BLD AUTO: 4.14 M/UL (ref 3.7–5.4)
RBC # BLD AUTO: 4.33 M/UL (ref 3.7–5.4)
RBC #/AREA URNS AUTO: 2 /HPF
RBC #/AREA URNS AUTO: <1 /HPF
RH BLOOD TYPE: POSITIVE
RHINOVIRUS/ENTERO PCR:: NEGATIVE
RSV RNA SPEC QL NAA+PROBE: NEGATIVE
SODIUM SERPL-SCNC: 134 MMOL/L (ref 136–144)
SODIUM SERPL-SCNC: 136 MMOL/L (ref 136–144)
SODIUM SERPL-SCNC: 136 MMOL/L (ref 136–144)
SODIUM SERPL-SCNC: 137 MMOL/L (ref 136–144)
SODIUM SERPL-SCNC: 138 MMOL/L (ref 136–144)
SODIUM SERPL-SCNC: 139 MMOL/L (ref 136–144)
SODIUM SERPL-SCNC: 139 MMOL/L (ref 136–144)
SODIUM SERPL-SCNC: 140 MMOL/L (ref 136–144)
SODIUM SERPL-SCNC: 141 MMOL/L (ref 136–144)
SODIUM SERPL-SCNC: 144 MMOL/L (ref 136–144)
SP GR UR STRIP: 1.01 (ref 1–1.03)
TIBC SERPL-MCNC: 322 MCG/DL (ref 228–428)
TRANSFERRIN SERPL-MCNC: 244 MG/DL (ref 192–382)
TROPONIN I SERPL-MCNC: 0.01 NG/ML (ref ?–0.03)
TROPONIN I SERPL-MCNC: 0.02 NG/ML (ref ?–0.03)
TROPONIN I SERPL-MCNC: 0.03 NG/ML (ref ?–0.03)
UROBILINOGEN UR STRIP-ACNC: <2
VIT B12 SERPL-MCNC: 274 PG/ML (ref 181–914)
VIT C UR-MCNC: 40 MG/DL
VIT C UR-MCNC: 40 MG/DL
VIT C UR-MCNC: NEGATIVE MG/DL
VIT C UR-MCNC: NEGATIVE MG/DL
WBC # BLD AUTO: 10.7 K/UL (ref 4–11)
WBC # BLD AUTO: 10.8 K/UL (ref 4–11)
WBC # BLD AUTO: 11 K/UL (ref 4–11)
WBC # BLD AUTO: 11.5 K/UL (ref 4–11)
WBC # BLD AUTO: 11.5 K/UL (ref 4–11)
WBC # BLD AUTO: 11.9 K/UL (ref 4–11)
WBC # BLD AUTO: 12.9 K/UL (ref 4–11)
WBC # BLD AUTO: 13.1 K/UL (ref 4–11)
WBC # BLD AUTO: 6.4 K/UL (ref 4–11)
WBC # BLD AUTO: 6.4 K/UL (ref 4–11)
WBC # BLD AUTO: 7.8 K/UL (ref 4–11)
WBC # BLD AUTO: 7.9 K/UL (ref 4–11)
WBC # BLD AUTO: 7.9 K/UL (ref 4–11)
WBC # BLD AUTO: 8.1 K/UL (ref 4–11)
WBC # BLD AUTO: 8.4 K/UL (ref 4–11)
WBC # BLD AUTO: 9.2 K/UL (ref 4–11)
WBC # BLD AUTO: 9.7 K/UL (ref 4–11)
WBC # BLD AUTO: 9.7 K/UL (ref 4–11)
WBC #/AREA URNS AUTO: 11 /HPF
WBC #/AREA URNS AUTO: 6 /HPF

## 2018-01-01 PROCEDURE — A4216 STERILE WATER/SALINE, 10 ML: HCPCS | Performed by: EMERGENCY MEDICINE

## 2018-01-01 PROCEDURE — 81001 URINALYSIS AUTO W/SCOPE: CPT | Performed by: INTERNAL MEDICINE

## 2018-01-01 PROCEDURE — 99285 EMERGENCY DEPT VISIT HI MDM: CPT

## 2018-01-01 PROCEDURE — 86850 RBC ANTIBODY SCREEN: CPT | Performed by: EMERGENCY MEDICINE

## 2018-01-01 PROCEDURE — 80048 BASIC METABOLIC PNL TOTAL CA: CPT | Performed by: EMERGENCY MEDICINE

## 2018-01-01 PROCEDURE — 99211 OFF/OP EST MAY X REQ PHY/QHP: CPT | Performed by: NURSE PRACTITIONER

## 2018-01-01 PROCEDURE — 99232 SBSQ HOSP IP/OBS MODERATE 35: CPT | Performed by: REGISTERED NURSE

## 2018-01-01 PROCEDURE — 80048 BASIC METABOLIC PNL TOTAL CA: CPT | Performed by: INTERNAL MEDICINE

## 2018-01-01 PROCEDURE — 0KNQ0ZZ RELEASE RIGHT UPPER LEG MUSCLE, OPEN APPROACH: ICD-10-PCS | Performed by: SURGERY

## 2018-01-01 PROCEDURE — 96374 THER/PROPH/DIAG INJ IV PUSH: CPT

## 2018-01-01 PROCEDURE — 86900 BLOOD TYPING SEROLOGIC ABO: CPT | Performed by: EMERGENCY MEDICINE

## 2018-01-01 PROCEDURE — 04UK0KZ SUPPLEMENT RIGHT FEMORAL ARTERY WITH NONAUTOLOGOUS TISSUE SUBSTITUTE, OPEN APPROACH: ICD-10-PCS | Performed by: SURGERY

## 2018-01-01 PROCEDURE — 30233N1 TRANSFUSION OF NONAUTOLOGOUS RED BLOOD CELLS INTO PERIPHERAL VEIN, PERCUTANEOUS APPROACH: ICD-10-PCS | Performed by: SURGERY

## 2018-01-01 PROCEDURE — 99232 SBSQ HOSP IP/OBS MODERATE 35: CPT | Performed by: INTERNAL MEDICINE

## 2018-01-01 PROCEDURE — 04CK0ZZ EXTIRPATION OF MATTER FROM RIGHT FEMORAL ARTERY, OPEN APPROACH: ICD-10-PCS | Performed by: SURGERY

## 2018-01-01 PROCEDURE — 85025 COMPLETE CBC W/AUTO DIFF WBC: CPT | Performed by: INTERNAL MEDICINE

## 2018-01-01 PROCEDURE — 80053 COMPREHEN METABOLIC PANEL: CPT | Performed by: FAMILY MEDICINE

## 2018-01-01 PROCEDURE — 5A1935Z RESPIRATORY VENTILATION, LESS THAN 24 CONSECUTIVE HOURS: ICD-10-PCS | Performed by: SURGERY

## 2018-01-01 PROCEDURE — 93005 ELECTROCARDIOGRAM TRACING: CPT

## 2018-01-01 PROCEDURE — 75635 CT ANGIO ABDOMINAL ARTERIES: CPT | Performed by: EMERGENCY MEDICINE

## 2018-01-01 PROCEDURE — 92610 EVALUATE SWALLOWING FUNCTION: CPT

## 2018-01-01 PROCEDURE — 99233 SBSQ HOSP IP/OBS HIGH 50: CPT | Performed by: INTERNAL MEDICINE

## 2018-01-01 PROCEDURE — 99231 SBSQ HOSP IP/OBS SF/LOW 25: CPT | Performed by: REGISTERED NURSE

## 2018-01-01 PROCEDURE — 0Y3H0ZZ CONTROL BLEEDING IN RIGHT LOWER LEG, OPEN APPROACH: ICD-10-PCS | Performed by: SURGERY

## 2018-01-01 PROCEDURE — 97162 PT EVAL MOD COMPLEX 30 MIN: CPT

## 2018-01-01 PROCEDURE — 87086 URINE CULTURE/COLONY COUNT: CPT | Performed by: INTERNAL MEDICINE

## 2018-01-01 PROCEDURE — 84484 ASSAY OF TROPONIN QUANT: CPT | Performed by: EMERGENCY MEDICINE

## 2018-01-01 PROCEDURE — 99214 OFFICE O/P EST MOD 30 MIN: CPT | Performed by: NURSE PRACTITIONER

## 2018-01-01 PROCEDURE — 75635 CT ANGIO ABDOMINAL ARTERIES: CPT | Performed by: SURGERY

## 2018-01-01 PROCEDURE — 93306 TTE W/DOPPLER COMPLETE: CPT | Performed by: INTERNAL MEDICINE

## 2018-01-01 PROCEDURE — 85018 HEMOGLOBIN: CPT | Performed by: INTERNAL MEDICINE

## 2018-01-01 PROCEDURE — 80048 BASIC METABOLIC PNL TOTAL CA: CPT

## 2018-01-01 PROCEDURE — 83735 ASSAY OF MAGNESIUM: CPT | Performed by: INTERNAL MEDICINE

## 2018-01-01 PROCEDURE — 71045 X-RAY EXAM CHEST 1 VIEW: CPT

## 2018-01-01 PROCEDURE — 5A1935Z RESPIRATORY VENTILATION, LESS THAN 24 CONSECUTIVE HOURS: ICD-10-PCS | Performed by: ANESTHESIOLOGY

## 2018-01-01 PROCEDURE — 93010 ELECTROCARDIOGRAM REPORT: CPT | Performed by: EMERGENCY MEDICINE

## 2018-01-01 PROCEDURE — 85025 COMPLETE CBC W/AUTO DIFF WBC: CPT

## 2018-01-01 PROCEDURE — 80053 COMPREHEN METABOLIC PANEL: CPT | Performed by: INTERNAL MEDICINE

## 2018-01-01 PROCEDURE — 80076 HEPATIC FUNCTION PANEL: CPT | Performed by: EMERGENCY MEDICINE

## 2018-01-01 PROCEDURE — 71045 X-RAY EXAM CHEST 1 VIEW: CPT | Performed by: SURGERY

## 2018-01-01 PROCEDURE — 99222 1ST HOSP IP/OBS MODERATE 55: CPT | Performed by: INTERNAL MEDICINE

## 2018-01-01 PROCEDURE — 36415 COLL VENOUS BLD VENIPUNCTURE: CPT

## 2018-01-01 PROCEDURE — 0DJ08ZZ INSPECTION OF UPPER INTESTINAL TRACT, VIA NATURAL OR ARTIFICIAL OPENING ENDOSCOPIC: ICD-10-PCS | Performed by: INTERNAL MEDICINE

## 2018-01-01 PROCEDURE — 36415 COLL VENOUS BLD VENIPUNCTURE: CPT | Performed by: NURSE PRACTITIONER

## 2018-01-01 PROCEDURE — 36430 TRANSFUSION BLD/BLD COMPNT: CPT

## 2018-01-01 PROCEDURE — 99310 SBSQ NF CARE HIGH MDM 45: CPT | Performed by: NURSE PRACTITIONER

## 2018-01-01 PROCEDURE — 86901 BLOOD TYPING SEROLOGIC RH(D): CPT | Performed by: EMERGENCY MEDICINE

## 2018-01-01 PROCEDURE — 85025 COMPLETE CBC W/AUTO DIFF WBC: CPT | Performed by: EMERGENCY MEDICINE

## 2018-01-01 PROCEDURE — 82607 VITAMIN B-12: CPT | Performed by: INTERNAL MEDICINE

## 2018-01-01 PROCEDURE — 85027 COMPLETE CBC AUTOMATED: CPT | Performed by: FAMILY MEDICINE

## 2018-01-01 PROCEDURE — 84443 ASSAY THYROID STIM HORMONE: CPT | Performed by: INTERNAL MEDICINE

## 2018-01-01 PROCEDURE — 3E05017 INTRODUCTION OF OTHER THROMBOLYTIC INTO PERIPHERAL ARTERY, OPEN APPROACH: ICD-10-PCS | Performed by: SURGERY

## 2018-01-01 PROCEDURE — 0KJY0ZZ INSPECTION OF LOWER MUSCLE, OPEN APPROACH: ICD-10-PCS | Performed by: SURGERY

## 2018-01-01 PROCEDURE — A4216 STERILE WATER/SALINE, 10 ML: HCPCS | Performed by: INTERNAL MEDICINE

## 2018-01-01 PROCEDURE — 99305 1ST NF CARE MODERATE MDM 35: CPT | Performed by: INTERNAL MEDICINE

## 2018-01-01 PROCEDURE — 0BP1XDZ REMOVAL OF INTRALUMINAL DEVICE FROM TRACHEA, EXTERNAL APPROACH: ICD-10-PCS | Performed by: SURGERY

## 2018-01-01 PROCEDURE — 83880 ASSAY OF NATRIURETIC PEPTIDE: CPT | Performed by: EMERGENCY MEDICINE

## 2018-01-01 PROCEDURE — 99291 CRITICAL CARE FIRST HOUR: CPT

## 2018-01-01 PROCEDURE — 94640 AIRWAY INHALATION TREATMENT: CPT

## 2018-01-01 PROCEDURE — 85060 BLOOD SMEAR INTERPRETATION: CPT | Performed by: EMERGENCY MEDICINE

## 2018-01-01 PROCEDURE — 85014 HEMATOCRIT: CPT | Performed by: INTERNAL MEDICINE

## 2018-01-01 PROCEDURE — 04CY0ZZ EXTIRPATION OF MATTER FROM LOWER ARTERY, OPEN APPROACH: ICD-10-PCS | Performed by: SURGERY

## 2018-01-01 PROCEDURE — 71045 X-RAY EXAM CHEST 1 VIEW: CPT | Performed by: EMERGENCY MEDICINE

## 2018-01-01 PROCEDURE — 86920 COMPATIBILITY TEST SPIN: CPT

## 2018-01-01 PROCEDURE — 93926 LOWER EXTREMITY STUDY: CPT | Performed by: SURGERY

## 2018-01-01 PROCEDURE — 84484 ASSAY OF TROPONIN QUANT: CPT

## 2018-01-01 PROCEDURE — 99223 1ST HOSP IP/OBS HIGH 75: CPT | Performed by: REGISTERED NURSE

## 2018-01-01 PROCEDURE — 81003 URINALYSIS AUTO W/O SCOPE: CPT | Performed by: FAMILY MEDICINE

## 2018-01-01 PROCEDURE — 87641 MR-STAPH DNA AMP PROBE: CPT | Performed by: EMERGENCY MEDICINE

## 2018-01-01 PROCEDURE — 0DJD8ZZ INSPECTION OF LOWER INTESTINAL TRACT, VIA NATURAL OR ARTIFICIAL OPENING ENDOSCOPIC: ICD-10-PCS | Performed by: INTERNAL MEDICINE

## 2018-01-01 PROCEDURE — 99497 ADVNCD CARE PLAN 30 MIN: CPT | Performed by: REGISTERED NURSE

## 2018-01-01 PROCEDURE — 0W3P8ZZ CONTROL BLEEDING IN GASTROINTESTINAL TRACT, VIA NATURAL OR ARTIFICIAL OPENING ENDOSCOPIC: ICD-10-PCS | Performed by: INTERNAL MEDICINE

## 2018-01-01 PROCEDURE — 84132 ASSAY OF SERUM POTASSIUM: CPT | Performed by: FAMILY MEDICINE

## 2018-01-01 PROCEDURE — 99223 1ST HOSP IP/OBS HIGH 75: CPT | Performed by: INTERNAL MEDICINE

## 2018-01-01 PROCEDURE — 99308 SBSQ NF CARE LOW MDM 20: CPT | Performed by: NURSE PRACTITIONER

## 2018-01-01 PROCEDURE — 80048 BASIC METABOLIC PNL TOTAL CA: CPT | Performed by: NURSE PRACTITIONER

## 2018-01-01 PROCEDURE — 92526 ORAL FUNCTION THERAPY: CPT

## 2018-01-01 PROCEDURE — 71046 X-RAY EXAM CHEST 2 VIEWS: CPT | Performed by: FAMILY MEDICINE

## 2018-01-01 PROCEDURE — C9113 INJ PANTOPRAZOLE SODIUM, VIA: HCPCS | Performed by: EMERGENCY MEDICINE

## 2018-01-01 RX ORDER — METHYLPREDNISOLONE SODIUM SUCCINATE 125 MG/2ML
125 INJECTION, POWDER, LYOPHILIZED, FOR SOLUTION INTRAMUSCULAR; INTRAVENOUS ONCE
Status: COMPLETED | OUTPATIENT
Start: 2018-01-01 | End: 2018-01-01

## 2018-01-01 RX ORDER — HEPARIN SODIUM 1000 [USP'U]/ML
80 INJECTION, SOLUTION INTRAVENOUS; SUBCUTANEOUS ONCE
Status: DISCONTINUED | OUTPATIENT
Start: 2018-01-01 | End: 2018-01-01

## 2018-01-01 RX ORDER — ASPIRIN 81 MG/1
81 TABLET ORAL DAILY
Status: DISCONTINUED | OUTPATIENT
Start: 2018-01-01 | End: 2018-01-01

## 2018-01-01 RX ORDER — HYDROMORPHONE HYDROCHLORIDE 1 MG/ML
0.4 INJECTION, SOLUTION INTRAMUSCULAR; INTRAVENOUS; SUBCUTANEOUS EVERY 5 MIN PRN
Status: DISCONTINUED | OUTPATIENT
Start: 2018-01-01 | End: 2018-01-01

## 2018-01-01 RX ORDER — ACETAMINOPHEN 325 MG/1
650 TABLET ORAL EVERY 6 HOURS PRN
Status: DISCONTINUED | OUTPATIENT
Start: 2018-01-01 | End: 2018-01-01

## 2018-01-01 RX ORDER — IPRATROPIUM BROMIDE AND ALBUTEROL SULFATE 2.5; .5 MG/3ML; MG/3ML
3 SOLUTION RESPIRATORY (INHALATION) EVERY 6 HOURS PRN
Status: DISCONTINUED | OUTPATIENT
Start: 2018-01-01 | End: 2018-01-01

## 2018-01-01 RX ORDER — 0.9 % SODIUM CHLORIDE 0.9 %
VIAL (ML) INJECTION
Status: COMPLETED
Start: 2018-01-01 | End: 2018-01-01

## 2018-01-01 RX ORDER — HYDROMORPHONE HYDROCHLORIDE 1 MG/ML
0.2 INJECTION, SOLUTION INTRAMUSCULAR; INTRAVENOUS; SUBCUTANEOUS EVERY 2 HOUR PRN
Status: DISCONTINUED | OUTPATIENT
Start: 2018-01-01 | End: 2018-01-01

## 2018-01-01 RX ORDER — PRAVASTATIN SODIUM 20 MG
20 TABLET ORAL NIGHTLY
Status: DISCONTINUED | OUTPATIENT
Start: 2018-01-01 | End: 2018-01-01

## 2018-01-01 RX ORDER — SODIUM CHLORIDE 0.9 % (FLUSH) 0.9 %
3 SYRINGE (ML) INJECTION AS NEEDED
Status: DISCONTINUED | OUTPATIENT
Start: 2018-01-01 | End: 2018-01-01

## 2018-01-01 RX ORDER — DILTIAZEM HYDROCHLORIDE 180 MG/1
180 CAPSULE, COATED, EXTENDED RELEASE ORAL DAILY
Status: DISCONTINUED | OUTPATIENT
Start: 2018-01-01 | End: 2018-01-01

## 2018-01-01 RX ORDER — DIPHENHYDRAMINE HYDROCHLORIDE 50 MG/ML
25 INJECTION INTRAMUSCULAR; INTRAVENOUS ONCE
Status: COMPLETED | OUTPATIENT
Start: 2018-01-01 | End: 2018-01-01

## 2018-01-01 RX ORDER — DIPHENHYDRAMINE HYDROCHLORIDE 50 MG/ML
50 INJECTION INTRAMUSCULAR; INTRAVENOUS ONCE
Status: COMPLETED | OUTPATIENT
Start: 2018-01-01 | End: 2018-01-01

## 2018-01-01 RX ORDER — POTASSIUM CHLORIDE 20 MEQ/1
40 TABLET, EXTENDED RELEASE ORAL
Status: ON HOLD | COMMUNITY
End: 2018-01-01

## 2018-01-01 RX ORDER — SODIUM CHLORIDE 0.9 % (FLUSH) 0.9 %
10 SYRINGE (ML) INJECTION AS NEEDED
Status: DISCONTINUED | OUTPATIENT
Start: 2018-01-01 | End: 2018-01-01

## 2018-01-01 RX ORDER — POTASSIUM CHLORIDE 20 MEQ/1
40 TABLET, EXTENDED RELEASE ORAL EVERY 4 HOURS
Status: DISCONTINUED | OUTPATIENT
Start: 2018-01-01 | End: 2018-01-01

## 2018-01-01 RX ORDER — HYDROCODONE BITARTRATE AND ACETAMINOPHEN 5; 325 MG/1; MG/1
1 TABLET ORAL AS NEEDED
Status: DISCONTINUED | OUTPATIENT
Start: 2018-01-01 | End: 2018-01-01

## 2018-01-01 RX ORDER — POTASSIUM CHLORIDE 20 MEQ/1
40 TABLET, EXTENDED RELEASE ORAL ONCE
Status: COMPLETED | OUTPATIENT
Start: 2018-01-01 | End: 2018-01-01

## 2018-01-01 RX ORDER — HYDROMORPHONE HYDROCHLORIDE 1 MG/ML
0.4 INJECTION, SOLUTION INTRAMUSCULAR; INTRAVENOUS; SUBCUTANEOUS ONCE
Status: COMPLETED | OUTPATIENT
Start: 2018-01-01 | End: 2018-01-01

## 2018-01-01 RX ORDER — ARIPIPRAZOLE 15 MG/1
40 TABLET ORAL ONCE
Status: COMPLETED | OUTPATIENT
Start: 2018-01-01 | End: 2018-01-01

## 2018-01-01 RX ORDER — HALOPERIDOL 5 MG/ML
1 INJECTION INTRAMUSCULAR
Status: DISCONTINUED | OUTPATIENT
Start: 2018-01-01 | End: 2018-01-01

## 2018-01-01 RX ORDER — MELATONIN
325
Status: DISCONTINUED | OUTPATIENT
Start: 2018-01-01 | End: 2018-01-01

## 2018-01-01 RX ORDER — ALBUTEROL SULFATE 2.5 MG/3ML
SOLUTION RESPIRATORY (INHALATION)
Status: DISPENSED
Start: 2018-01-01 | End: 2018-01-01

## 2018-01-01 RX ORDER — GLYCOPYRROLATE 0.2 MG/ML
0.4 INJECTION, SOLUTION INTRAMUSCULAR; INTRAVENOUS
Status: DISCONTINUED | OUTPATIENT
Start: 2018-01-01 | End: 2018-01-01

## 2018-01-01 RX ORDER — GABAPENTIN 100 MG/1
200 CAPSULE ORAL NIGHTLY
Status: DISCONTINUED | OUTPATIENT
Start: 2018-01-01 | End: 2018-01-01

## 2018-01-01 RX ORDER — TRAMADOL HYDROCHLORIDE 50 MG/1
50 TABLET ORAL EVERY 6 HOURS PRN
Status: DISCONTINUED | OUTPATIENT
Start: 2018-01-01 | End: 2018-01-01

## 2018-01-01 RX ORDER — HYDROMORPHONE HYDROCHLORIDE 1 MG/ML
0.5 INJECTION, SOLUTION INTRAMUSCULAR; INTRAVENOUS; SUBCUTANEOUS
Status: DISCONTINUED | OUTPATIENT
Start: 2018-01-01 | End: 2018-01-01

## 2018-01-01 RX ORDER — PREDNISONE 20 MG/1
20 TABLET ORAL
Qty: 2 TABLET | Refills: 0 | Status: SHIPPED | OUTPATIENT
Start: 2018-01-01 | End: 2018-01-01

## 2018-01-01 RX ORDER — ACETAMINOPHEN 650 MG/1
650 SUPPOSITORY RECTAL EVERY 4 HOURS PRN
Status: DISCONTINUED | OUTPATIENT
Start: 2018-01-01 | End: 2018-01-01

## 2018-01-01 RX ORDER — POTASSIUM CHLORIDE 20 MEQ/1
40 TABLET, EXTENDED RELEASE ORAL EVERY 4 HOURS
Status: ACTIVE | OUTPATIENT
Start: 2018-01-01 | End: 2018-01-01

## 2018-01-01 RX ORDER — HYDROMORPHONE HYDROCHLORIDE 1 MG/ML
0.2 INJECTION, SOLUTION INTRAMUSCULAR; INTRAVENOUS; SUBCUTANEOUS EVERY 5 MIN PRN
Status: DISCONTINUED | OUTPATIENT
Start: 2018-01-01 | End: 2018-01-01

## 2018-01-01 RX ORDER — RISPERIDONE 0.25 MG/1
0.25 TABLET, FILM COATED ORAL 2 TIMES DAILY PRN
Status: ON HOLD | COMMUNITY
End: 2018-01-01

## 2018-01-01 RX ORDER — POLYETHYLENE GLYCOL 3350 17 G/17G
17 POWDER, FOR SOLUTION ORAL DAILY PRN
Status: DISCONTINUED | OUTPATIENT
Start: 2018-01-01 | End: 2018-01-01

## 2018-01-01 RX ORDER — SODIUM CHLORIDE, SODIUM LACTATE, POTASSIUM CHLORIDE, CALCIUM CHLORIDE 600; 310; 30; 20 MG/100ML; MG/100ML; MG/100ML; MG/100ML
INJECTION, SOLUTION INTRAVENOUS CONTINUOUS PRN
Status: DISCONTINUED | OUTPATIENT
Start: 2018-01-01 | End: 2018-01-01 | Stop reason: SURG

## 2018-01-01 RX ORDER — BISACODYL 10 MG
10 SUPPOSITORY, RECTAL RECTAL ONCE
Status: COMPLETED | OUTPATIENT
Start: 2018-01-01 | End: 2018-01-01

## 2018-01-01 RX ORDER — MELATONIN
60 2 TIMES DAILY
Qty: 60 G | Refills: 0 | Status: ON HOLD | OUTPATIENT
Start: 2018-01-01 | End: 2018-01-01

## 2018-01-01 RX ORDER — CHLORHEXIDINE GLUCONATE 0.12 MG/ML
15 RINSE ORAL
Status: DISCONTINUED | OUTPATIENT
Start: 2018-01-01 | End: 2018-01-01

## 2018-01-01 RX ORDER — SODIUM CHLORIDE 9 MG/ML
INJECTION, SOLUTION INTRAVENOUS
Status: COMPLETED
Start: 2018-01-01 | End: 2018-01-01

## 2018-01-01 RX ORDER — BISACODYL 10 MG
10 SUPPOSITORY, RECTAL RECTAL
Status: DISCONTINUED | OUTPATIENT
Start: 2018-01-01 | End: 2018-01-01

## 2018-01-01 RX ORDER — DEXTROSE, SODIUM CHLORIDE, AND POTASSIUM CHLORIDE 5; .45; .075 G/100ML; G/100ML; G/100ML
INJECTION INTRAVENOUS CONTINUOUS
Status: DISCONTINUED | OUTPATIENT
Start: 2018-01-01 | End: 2018-01-01

## 2018-01-01 RX ORDER — ACETAMINOPHEN 160 MG/5ML
650 SOLUTION ORAL EVERY 4 HOURS PRN
Status: DISCONTINUED | OUTPATIENT
Start: 2018-01-01 | End: 2018-01-01

## 2018-01-01 RX ORDER — MORPHINE SULFATE 2 MG/ML
2 INJECTION, SOLUTION INTRAMUSCULAR; INTRAVENOUS EVERY 2 HOUR PRN
Status: DISCONTINUED | OUTPATIENT
Start: 2018-01-01 | End: 2018-01-01

## 2018-01-01 RX ORDER — MORPHINE SULFATE 4 MG/ML
4 INJECTION, SOLUTION INTRAMUSCULAR; INTRAVENOUS EVERY 2 HOUR PRN
Status: DISCONTINUED | OUTPATIENT
Start: 2018-01-01 | End: 2018-01-01

## 2018-01-01 RX ORDER — LIDOCAINE HYDROCHLORIDE 10 MG/ML
INJECTION, SOLUTION EPIDURAL; INFILTRATION; INTRACAUDAL; PERINEURAL AS NEEDED
Status: DISCONTINUED | OUTPATIENT
Start: 2018-01-01 | End: 2018-01-01 | Stop reason: SURG

## 2018-01-01 RX ORDER — HYDROMORPHONE HYDROCHLORIDE 1 MG/ML
0.5 INJECTION, SOLUTION INTRAMUSCULAR; INTRAVENOUS; SUBCUTANEOUS ONCE
Status: COMPLETED | OUTPATIENT
Start: 2018-01-01 | End: 2018-01-01

## 2018-01-01 RX ORDER — MAGNESIUM OXIDE 400 MG (241.3 MG MAGNESIUM) TABLET
400 TABLET DAILY
Status: DISCONTINUED | OUTPATIENT
Start: 2018-01-01 | End: 2018-01-01

## 2018-01-01 RX ORDER — POTASSIUM CHLORIDE 600 MG/1
30 TABLET, FILM COATED, EXTENDED RELEASE ORAL 2 TIMES DAILY
Status: DISCONTINUED | OUTPATIENT
Start: 2018-01-01 | End: 2018-01-01

## 2018-01-01 RX ORDER — SENNA AND DOCUSATE SODIUM 50; 8.6 MG/1; MG/1
1 TABLET, FILM COATED ORAL 2 TIMES DAILY
Status: DISCONTINUED | OUTPATIENT
Start: 2018-01-01 | End: 2018-01-01

## 2018-01-01 RX ORDER — PANTOPRAZOLE SODIUM 40 MG/1
40 TABLET, DELAYED RELEASE ORAL DAILY
Status: DISCONTINUED | OUTPATIENT
Start: 2018-01-01 | End: 2018-01-01

## 2018-01-01 RX ORDER — HEPARIN SODIUM AND DEXTROSE 10000; 5 [USP'U]/100ML; G/100ML
18 INJECTION INTRAVENOUS ONCE
Status: DISCONTINUED | OUTPATIENT
Start: 2018-01-01 | End: 2018-01-01

## 2018-01-01 RX ORDER — DOCUSATE SODIUM 100 MG/1
100 CAPSULE, LIQUID FILLED ORAL 2 TIMES DAILY
Status: DISCONTINUED | OUTPATIENT
Start: 2018-01-01 | End: 2018-01-01

## 2018-01-01 RX ORDER — FUROSEMIDE 10 MG/ML
40 INJECTION INTRAMUSCULAR; INTRAVENOUS ONCE
Status: COMPLETED | OUTPATIENT
Start: 2018-01-01 | End: 2018-01-01

## 2018-01-01 RX ORDER — NALOXONE HYDROCHLORIDE 0.4 MG/ML
80 INJECTION, SOLUTION INTRAMUSCULAR; INTRAVENOUS; SUBCUTANEOUS AS NEEDED
Status: DISCONTINUED | OUTPATIENT
Start: 2018-01-01 | End: 2018-01-01

## 2018-01-01 RX ORDER — GABAPENTIN 100 MG/1
100 CAPSULE ORAL NIGHTLY
Status: DISCONTINUED | OUTPATIENT
Start: 2018-01-01 | End: 2018-01-01

## 2018-01-01 RX ORDER — SCOLOPAMINE TRANSDERMAL SYSTEM 1 MG/1
1 PATCH, EXTENDED RELEASE TRANSDERMAL
Status: DISCONTINUED | OUTPATIENT
Start: 2018-01-01 | End: 2018-01-01

## 2018-01-01 RX ORDER — LORAZEPAM 2 MG/ML
1 INJECTION INTRAMUSCULAR EVERY 4 HOURS PRN
Status: DISCONTINUED | OUTPATIENT
Start: 2018-01-01 | End: 2018-01-01

## 2018-01-01 RX ORDER — HYDROMORPHONE HYDROCHLORIDE 1 MG/ML
0.6 INJECTION, SOLUTION INTRAMUSCULAR; INTRAVENOUS; SUBCUTANEOUS EVERY 5 MIN PRN
Status: DISCONTINUED | OUTPATIENT
Start: 2018-01-01 | End: 2018-01-01

## 2018-01-01 RX ORDER — SODIUM CHLORIDE 9 MG/ML
INJECTION, SOLUTION INTRAVENOUS ONCE
Status: COMPLETED | OUTPATIENT
Start: 2018-01-01 | End: 2018-01-01

## 2018-01-01 RX ORDER — DEXTROSE AND SODIUM CHLORIDE 5; .45 G/100ML; G/100ML
INJECTION, SOLUTION INTRAVENOUS CONTINUOUS
Status: DISCONTINUED | OUTPATIENT
Start: 2018-01-01 | End: 2018-01-01

## 2018-01-01 RX ORDER — MELATONIN
Status: DISCONTINUED | OUTPATIENT
Start: 2018-01-01 | End: 2018-01-01

## 2018-01-01 RX ORDER — HYDROMORPHONE HYDROCHLORIDE 1 MG/ML
0.8 INJECTION, SOLUTION INTRAMUSCULAR; INTRAVENOUS; SUBCUTANEOUS EVERY 2 HOUR PRN
Status: DISCONTINUED | OUTPATIENT
Start: 2018-01-01 | End: 2018-01-01

## 2018-01-01 RX ORDER — FUROSEMIDE 10 MG/ML
INJECTION INTRAMUSCULAR; INTRAVENOUS
Status: DISPENSED
Start: 2018-01-01 | End: 2018-01-01

## 2018-01-01 RX ORDER — FUROSEMIDE 40 MG/1
40 TABLET ORAL EVERY 8 HOURS PRN
Status: DISCONTINUED | OUTPATIENT
Start: 2018-01-01 | End: 2018-01-01

## 2018-01-01 RX ORDER — HALOPERIDOL 5 MG/ML
0.25 INJECTION INTRAMUSCULAR ONCE AS NEEDED
Status: DISCONTINUED | OUTPATIENT
Start: 2018-01-01 | End: 2018-01-01

## 2018-01-01 RX ORDER — LEVOTHYROXINE SODIUM 88 UG/1
88 TABLET ORAL
Status: DISCONTINUED | OUTPATIENT
Start: 2018-01-01 | End: 2018-01-01

## 2018-01-01 RX ORDER — RISPERIDONE 0.25 MG/1
0.25 TABLET, FILM COATED ORAL
Status: ON HOLD | COMMUNITY
End: 2018-01-01

## 2018-01-01 RX ORDER — POTASSIUM CHLORIDE 20 MEQ/1
40 TABLET, EXTENDED RELEASE ORAL EVERY 4 HOURS
Status: COMPLETED | OUTPATIENT
Start: 2018-01-01 | End: 2018-01-01

## 2018-01-01 RX ORDER — HALOPERIDOL 5 MG/ML
2 INJECTION INTRAMUSCULAR
Status: DISCONTINUED | OUTPATIENT
Start: 2018-01-01 | End: 2018-01-01

## 2018-01-01 RX ORDER — LEVOTHYROXINE SODIUM 0.05 MG/1
50 TABLET ORAL
Status: DISCONTINUED | OUTPATIENT
Start: 2018-01-01 | End: 2018-01-01

## 2018-01-01 RX ORDER — ARIPIPRAZOLE 15 MG/1
40 TABLET ORAL ONCE
Status: DISCONTINUED | OUTPATIENT
Start: 2018-01-01 | End: 2018-01-01

## 2018-01-01 RX ORDER — DILTIAZEM HYDROCHLORIDE 60 MG/1
60 TABLET, FILM COATED ORAL AS NEEDED
Status: DISCONTINUED | OUTPATIENT
Start: 2018-01-01 | End: 2018-01-01

## 2018-01-01 RX ORDER — SODIUM CHLORIDE, SODIUM LACTATE, POTASSIUM CHLORIDE, CALCIUM CHLORIDE 600; 310; 30; 20 MG/100ML; MG/100ML; MG/100ML; MG/100ML
INJECTION, SOLUTION INTRAVENOUS CONTINUOUS
Status: DISCONTINUED | OUTPATIENT
Start: 2018-01-01 | End: 2018-01-01

## 2018-01-01 RX ORDER — SPIRONOLACTONE 25 MG/1
25 TABLET ORAL DAILY
Status: ON HOLD | COMMUNITY
Start: 2018-01-01 | End: 2018-01-01

## 2018-01-01 RX ORDER — METOPROLOL TARTRATE 5 MG/5ML
2.5 INJECTION INTRAVENOUS ONCE
Status: DISCONTINUED | OUTPATIENT
Start: 2018-01-01 | End: 2018-01-01

## 2018-01-01 RX ORDER — ONDANSETRON 4 MG/1
4 TABLET, ORALLY DISINTEGRATING ORAL EVERY 8 HOURS PRN
COMMUNITY
End: 2018-01-01

## 2018-01-01 RX ORDER — DEXTROSE MONOHYDRATE 25 G/50ML
50 INJECTION, SOLUTION INTRAVENOUS
Status: DISCONTINUED | OUTPATIENT
Start: 2018-01-01 | End: 2018-01-01 | Stop reason: HOSPADM

## 2018-01-01 RX ORDER — MORPHINE SULFATE 4 MG/ML
8 INJECTION, SOLUTION INTRAMUSCULAR; INTRAVENOUS EVERY 2 HOUR PRN
Status: DISCONTINUED | OUTPATIENT
Start: 2018-01-01 | End: 2018-01-01

## 2018-01-01 RX ORDER — ONDANSETRON 2 MG/ML
4 INJECTION INTRAMUSCULAR; INTRAVENOUS ONCE AS NEEDED
Status: ACTIVE | OUTPATIENT
Start: 2018-01-01 | End: 2018-01-01

## 2018-01-01 RX ORDER — HALOPERIDOL 5 MG/ML
0.25 INJECTION INTRAMUSCULAR ONCE AS NEEDED
Status: ACTIVE | OUTPATIENT
Start: 2018-01-01 | End: 2018-01-01

## 2018-01-01 RX ORDER — NALOXONE HYDROCHLORIDE 0.4 MG/ML
80 INJECTION, SOLUTION INTRAMUSCULAR; INTRAVENOUS; SUBCUTANEOUS AS NEEDED
Status: DISCONTINUED | OUTPATIENT
Start: 2018-01-01 | End: 2018-01-01 | Stop reason: HOSPADM

## 2018-01-01 RX ORDER — LORAZEPAM 2 MG/ML
0.5 INJECTION INTRAMUSCULAR EVERY 4 HOURS PRN
Status: DISCONTINUED | OUTPATIENT
Start: 2018-01-01 | End: 2018-01-01

## 2018-01-01 RX ORDER — METOPROLOL SUCCINATE 25 MG/1
25 TABLET, EXTENDED RELEASE ORAL NIGHTLY
Status: ON HOLD | COMMUNITY
End: 2018-01-01

## 2018-01-01 RX ORDER — HYDROCODONE BITARTRATE AND ACETAMINOPHEN 5; 325 MG/1; MG/1
1 TABLET ORAL EVERY 6 HOURS PRN
Status: DISCONTINUED | OUTPATIENT
Start: 2018-01-01 | End: 2018-01-01

## 2018-01-01 RX ORDER — CLINDAMYCIN PHOSPHATE 150 MG/ML
INJECTION, SOLUTION INTRAVENOUS AS NEEDED
Status: DISCONTINUED | OUTPATIENT
Start: 2018-01-01 | End: 2018-01-01 | Stop reason: SURG

## 2018-01-01 RX ORDER — TORSEMIDE 20 MG/1
60 TABLET ORAL DAILY
Status: ON HOLD | COMMUNITY
End: 2018-01-01

## 2018-01-01 RX ORDER — FUROSEMIDE 10 MG/ML
10 INJECTION INTRAMUSCULAR; INTRAVENOUS ONCE
Status: COMPLETED | OUTPATIENT
Start: 2018-01-01 | End: 2018-01-01

## 2018-01-01 RX ORDER — OSELTAMIVIR PHOSPHATE 75 MG/1
75 CAPSULE ORAL NIGHTLY
Status: DISCONTINUED | OUTPATIENT
Start: 2018-01-01 | End: 2018-01-01

## 2018-01-01 RX ORDER — ROPINIROLE 0.5 MG/1
1 TABLET, FILM COATED ORAL NIGHTLY
Status: DISCONTINUED | OUTPATIENT
Start: 2018-01-01 | End: 2018-01-01

## 2018-01-01 RX ORDER — ROPINIROLE 1 MG/1
1 TABLET, FILM COATED ORAL NIGHTLY
Status: DISCONTINUED | OUTPATIENT
Start: 2018-01-01 | End: 2018-01-01

## 2018-01-01 RX ORDER — POTASSIUM CHLORIDE 14.9 MG/ML
20 INJECTION INTRAVENOUS ONCE
Status: COMPLETED | OUTPATIENT
Start: 2018-01-01 | End: 2018-01-01

## 2018-01-01 RX ORDER — PHENYLEPHRINE HCL 10 MG/ML
VIAL (ML) INJECTION AS NEEDED
Status: DISCONTINUED | OUTPATIENT
Start: 2018-01-01 | End: 2018-01-01 | Stop reason: SURG

## 2018-01-01 RX ORDER — ONDANSETRON 2 MG/ML
4 INJECTION INTRAMUSCULAR; INTRAVENOUS EVERY 6 HOURS PRN
Status: DISCONTINUED | OUTPATIENT
Start: 2018-01-01 | End: 2018-01-01

## 2018-01-01 RX ORDER — TORSEMIDE 20 MG/1
20 TABLET ORAL EVERY EVENING
Status: ON HOLD | COMMUNITY
End: 2018-01-01

## 2018-01-01 RX ORDER — DILTIAZEM HYDROCHLORIDE 240 MG/1
240 CAPSULE, COATED, EXTENDED RELEASE ORAL DAILY
Status: DISCONTINUED | OUTPATIENT
Start: 2018-01-01 | End: 2018-01-01

## 2018-01-01 RX ORDER — MORPHINE SULFATE 2 MG/ML
1 INJECTION, SOLUTION INTRAMUSCULAR; INTRAVENOUS EVERY 6 HOURS PRN
Status: DISCONTINUED | OUTPATIENT
Start: 2018-01-01 | End: 2018-01-01

## 2018-01-01 RX ORDER — PREDNISONE 20 MG/1
40 TABLET ORAL
Status: DISCONTINUED | OUTPATIENT
Start: 2018-01-01 | End: 2018-01-01

## 2018-01-01 RX ORDER — HYDROMORPHONE HYDROCHLORIDE 1 MG/ML
1 INJECTION, SOLUTION INTRAMUSCULAR; INTRAVENOUS; SUBCUTANEOUS
Status: DISCONTINUED | OUTPATIENT
Start: 2018-01-01 | End: 2018-01-01

## 2018-01-01 RX ORDER — SODIUM CHLORIDE 9 MG/ML
INJECTION, SOLUTION INTRAVENOUS
Status: DISCONTINUED
Start: 2018-01-01 | End: 2018-01-01

## 2018-01-01 RX ORDER — DIPHENHYDRAMINE HYDROCHLORIDE 50 MG/ML
INJECTION INTRAMUSCULAR; INTRAVENOUS
Status: COMPLETED
Start: 2018-01-01 | End: 2018-01-01

## 2018-01-01 RX ORDER — 0.9 % SODIUM CHLORIDE 0.9 %
VIAL (ML) INJECTION
Status: DISCONTINUED
Start: 2018-01-01 | End: 2018-01-01

## 2018-01-01 RX ORDER — ROCURONIUM BROMIDE 10 MG/ML
INJECTION, SOLUTION INTRAVENOUS AS NEEDED
Status: DISCONTINUED | OUTPATIENT
Start: 2018-01-01 | End: 2018-01-01 | Stop reason: SURG

## 2018-01-01 RX ORDER — FUROSEMIDE 10 MG/ML
20 INJECTION INTRAMUSCULAR; INTRAVENOUS ONCE
Status: COMPLETED | OUTPATIENT
Start: 2018-01-01 | End: 2018-01-01

## 2018-01-01 RX ORDER — PREDNISONE 1 MG/1
5 TABLET ORAL DAILY
Status: ON HOLD | COMMUNITY
End: 2018-01-01

## 2018-01-01 RX ORDER — MELATONIN
2 TIMES DAILY
Status: DISCONTINUED | OUTPATIENT
Start: 2018-01-01 | End: 2018-01-01

## 2018-01-01 RX ORDER — METHYLPREDNISOLONE SODIUM SUCCINATE 40 MG/ML
40 INJECTION, POWDER, LYOPHILIZED, FOR SOLUTION INTRAMUSCULAR; INTRAVENOUS EVERY 8 HOURS
Status: DISCONTINUED | OUTPATIENT
Start: 2018-01-01 | End: 2018-01-01

## 2018-01-01 RX ORDER — TORSEMIDE 20 MG/1
60 TABLET ORAL DAILY
Status: DISCONTINUED | OUTPATIENT
Start: 2018-01-01 | End: 2018-01-01

## 2018-01-01 RX ORDER — MAGNESIUM OXIDE 400 MG (241.3 MG MAGNESIUM) TABLET
400 TABLET 2 TIMES DAILY
Status: DISCONTINUED | OUTPATIENT
Start: 2018-01-01 | End: 2018-01-01

## 2018-01-01 RX ORDER — DILTIAZEM HYDROCHLORIDE 5 MG/ML
5 INJECTION INTRAVENOUS ONCE
Status: COMPLETED | OUTPATIENT
Start: 2018-01-01 | End: 2018-01-01

## 2018-01-01 RX ORDER — IPRATROPIUM BROMIDE AND ALBUTEROL SULFATE 2.5; .5 MG/3ML; MG/3ML
3 SOLUTION RESPIRATORY (INHALATION) EVERY 6 HOURS PRN
Qty: 50 VIAL | Refills: 0 | Status: ON HOLD | OUTPATIENT
Start: 2018-01-01 | End: 2018-01-01

## 2018-01-01 RX ORDER — TORSEMIDE 20 MG/1
20 TABLET ORAL NIGHTLY
Status: DISCONTINUED | OUTPATIENT
Start: 2018-01-01 | End: 2018-01-01

## 2018-01-01 RX ORDER — HEPARIN SODIUM 1000 [USP'U]/ML
INJECTION, SOLUTION INTRAVENOUS; SUBCUTANEOUS AS NEEDED
Status: DISCONTINUED | OUTPATIENT
Start: 2018-01-01 | End: 2018-01-01 | Stop reason: SURG

## 2018-01-01 RX ORDER — IPRATROPIUM BROMIDE AND ALBUTEROL SULFATE 2.5; .5 MG/3ML; MG/3ML
3 SOLUTION RESPIRATORY (INHALATION) ONCE
Status: COMPLETED | OUTPATIENT
Start: 2018-01-01 | End: 2018-01-01

## 2018-01-01 RX ORDER — METHYLPREDNISOLONE SODIUM SUCCINATE 125 MG/2ML
INJECTION, POWDER, LYOPHILIZED, FOR SOLUTION INTRAMUSCULAR; INTRAVENOUS
Status: COMPLETED
Start: 2018-01-01 | End: 2018-01-01

## 2018-01-01 RX ORDER — HYDROCODONE BITARTRATE AND ACETAMINOPHEN 5; 325 MG/1; MG/1
2 TABLET ORAL AS NEEDED
Status: DISCONTINUED | OUTPATIENT
Start: 2018-01-01 | End: 2018-01-01

## 2018-01-01 RX ORDER — TORSEMIDE 20 MG/1
20 TABLET ORAL EVERY EVENING
Status: DISCONTINUED | OUTPATIENT
Start: 2018-01-01 | End: 2018-01-01

## 2018-01-01 RX ORDER — LEVOTHYROXINE SODIUM 0.05 MG/1
50 TABLET ORAL
Status: ON HOLD | COMMUNITY
End: 2018-01-01

## 2018-01-01 RX ORDER — HEPARIN SODIUM AND DEXTROSE 10000; 5 [USP'U]/100ML; G/100ML
INJECTION INTRAVENOUS CONTINUOUS
Status: DISCONTINUED | OUTPATIENT
Start: 2018-01-01 | End: 2018-01-01 | Stop reason: CLARIF

## 2018-01-01 RX ORDER — DILTIAZEM HYDROCHLORIDE 5 MG/ML
INJECTION INTRAVENOUS
Status: DISPENSED
Start: 2018-01-01 | End: 2018-01-01

## 2018-01-01 RX ORDER — ACETAMINOPHEN 650 MG/1
650 SUPPOSITORY RECTAL EVERY 6 HOURS PRN
Status: DISCONTINUED | OUTPATIENT
Start: 2018-01-01 | End: 2018-01-01

## 2018-01-01 RX ORDER — METHYLPREDNISOLONE SODIUM SUCCINATE 125 MG/2ML
60 INJECTION, POWDER, LYOPHILIZED, FOR SOLUTION INTRAMUSCULAR; INTRAVENOUS ONCE
Status: COMPLETED | OUTPATIENT
Start: 2018-01-01 | End: 2018-01-01

## 2018-01-01 RX ORDER — METOLAZONE 2.5 MG/1
2.5 TABLET ORAL WEEKLY
Status: ON HOLD | COMMUNITY
Start: 2018-01-01 | End: 2018-01-01

## 2018-01-01 RX ORDER — TORSEMIDE 20 MG/1
20 TABLET ORAL
Qty: 60 TABLET | Refills: 0 | Status: SHIPPED | OUTPATIENT
Start: 2018-01-01 | End: 2018-01-01

## 2018-01-01 RX ORDER — TRAMADOL HYDROCHLORIDE 50 MG/1
50 TABLET ORAL EVERY 12 HOURS PRN
Qty: 40 TABLET | Refills: 0 | Status: ON HOLD | OUTPATIENT
Start: 2018-01-01 | End: 2018-01-01

## 2018-01-01 RX ORDER — TRAMADOL HYDROCHLORIDE 50 MG/1
50 TABLET ORAL EVERY 6 HOURS PRN
COMMUNITY
End: 2018-01-01

## 2018-01-01 RX ORDER — ARIPIPRAZOLE 15 MG/1
40 TABLET ORAL 2 TIMES DAILY
Status: DISCONTINUED | OUTPATIENT
Start: 2018-01-01 | End: 2018-01-01

## 2018-01-01 RX ORDER — ACETAMINOPHEN 325 MG/1
650 TABLET ORAL
Status: ON HOLD | COMMUNITY
End: 2018-01-01

## 2018-01-01 RX ORDER — ALBUTEROL SULFATE 2.5 MG/3ML
2.5 SOLUTION RESPIRATORY (INHALATION) ONCE
Status: COMPLETED | OUTPATIENT
Start: 2018-01-01 | End: 2018-01-01

## 2018-01-01 RX ORDER — PANTOPRAZOLE SODIUM 40 MG/1
40 TABLET, DELAYED RELEASE ORAL
Status: DISCONTINUED | OUTPATIENT
Start: 2018-01-01 | End: 2018-01-01

## 2018-01-01 RX ORDER — MAGNESIUM CARB/ALUMINUM HYDROX 105-160MG
296 TABLET,CHEWABLE ORAL ONCE
Status: COMPLETED | OUTPATIENT
Start: 2018-01-01 | End: 2018-01-01

## 2018-01-01 RX ORDER — HEPARIN SODIUM 10000 [USP'U]/100ML
12 INJECTION, SOLUTION INTRAVENOUS CONTINUOUS
Status: DISCONTINUED | OUTPATIENT
Start: 2018-01-01 | End: 2018-01-01

## 2018-01-01 RX ORDER — PREDNISONE 20 MG/1
20 TABLET ORAL
Status: DISCONTINUED | OUTPATIENT
Start: 2018-01-01 | End: 2018-01-01

## 2018-01-01 RX ORDER — HYDROMORPHONE HYDROCHLORIDE 1 MG/ML
0.4 INJECTION, SOLUTION INTRAMUSCULAR; INTRAVENOUS; SUBCUTANEOUS EVERY 2 HOUR PRN
Status: DISCONTINUED | OUTPATIENT
Start: 2018-01-01 | End: 2018-01-01

## 2018-01-01 RX ORDER — FUROSEMIDE 20 MG/1
20 TABLET ORAL DAILY
Status: DISCONTINUED | OUTPATIENT
Start: 2018-01-01 | End: 2018-01-01

## 2018-01-01 RX ORDER — ONDANSETRON 4 MG/1
4 TABLET, ORALLY DISINTEGRATING ORAL EVERY 8 HOURS PRN
Status: DISCONTINUED | OUTPATIENT
Start: 2018-01-01 | End: 2018-01-01

## 2018-01-01 RX ORDER — ONDANSETRON 4 MG/1
4 TABLET, ORALLY DISINTEGRATING ORAL EVERY 6 HOURS PRN
Status: DISCONTINUED | OUTPATIENT
Start: 2018-01-01 | End: 2018-01-01

## 2018-01-01 RX ORDER — ONDANSETRON 2 MG/ML
4 INJECTION INTRAMUSCULAR; INTRAVENOUS ONCE AS NEEDED
Status: DISCONTINUED | OUTPATIENT
Start: 2018-01-01 | End: 2018-01-01

## 2018-01-01 RX ORDER — IPRATROPIUM BROMIDE AND ALBUTEROL SULFATE 2.5; .5 MG/3ML; MG/3ML
3 SOLUTION RESPIRATORY (INHALATION)
Status: DISCONTINUED | OUTPATIENT
Start: 2018-01-01 | End: 2018-01-01

## 2018-01-01 RX ORDER — FUROSEMIDE 10 MG/ML
40 INJECTION INTRAMUSCULAR; INTRAVENOUS EVERY 8 HOURS PRN
Status: DISCONTINUED | OUTPATIENT
Start: 2018-01-01 | End: 2018-01-01

## 2018-01-01 RX ORDER — NITROGLYCERIN 0.4 MG/1
0.4 TABLET SUBLINGUAL EVERY 5 MIN PRN
Status: DISCONTINUED | OUTPATIENT
Start: 2018-01-01 | End: 2018-01-01

## 2018-01-01 RX ORDER — TRAMADOL HYDROCHLORIDE 50 MG/1
50 TABLET ORAL EVERY 12 HOURS PRN
Status: DISCONTINUED | OUTPATIENT
Start: 2018-01-01 | End: 2018-01-01

## 2018-01-01 RX ORDER — HYDROMORPHONE HYDROCHLORIDE 1 MG/ML
1.2 INJECTION, SOLUTION INTRAMUSCULAR; INTRAVENOUS; SUBCUTANEOUS EVERY 2 HOUR PRN
Status: DISCONTINUED | OUTPATIENT
Start: 2018-01-01 | End: 2018-01-01

## 2018-01-01 RX ORDER — SODIUM CHLORIDE 9 MG/ML
INJECTION, SOLUTION INTRAVENOUS CONTINUOUS
Status: DISCONTINUED | OUTPATIENT
Start: 2018-01-01 | End: 2018-01-01 | Stop reason: CLARIF

## 2018-01-01 RX ORDER — DOCUSATE SODIUM 100 MG/1
100 CAPSULE, LIQUID FILLED ORAL 2 TIMES DAILY
Status: ON HOLD | COMMUNITY
Start: 2018-01-01 | End: 2018-01-01

## 2018-01-01 RX ORDER — ACETAMINOPHEN 160 MG/5ML
650 SOLUTION ORAL EVERY 6 HOURS PRN
Status: DISCONTINUED | OUTPATIENT
Start: 2018-01-01 | End: 2018-01-01

## 2018-01-01 RX ORDER — LORAZEPAM 2 MG/ML
2 INJECTION INTRAMUSCULAR EVERY 4 HOURS PRN
Status: DISCONTINUED | OUTPATIENT
Start: 2018-01-01 | End: 2018-01-01

## 2018-01-01 RX ADMIN — ROCURONIUM BROMIDE 10 MG: 10 INJECTION, SOLUTION INTRAVENOUS at 03:13:00

## 2018-01-01 RX ADMIN — ROCURONIUM BROMIDE 30 MG: 10 INJECTION, SOLUTION INTRAVENOUS at 02:33:00

## 2018-01-01 RX ADMIN — ROCURONIUM BROMIDE 10 MG: 10 INJECTION, SOLUTION INTRAVENOUS at 03:30:00

## 2018-01-01 RX ADMIN — PHENYLEPHRINE HCL 100 MCG: 10 MG/ML VIAL (ML) INJECTION at 03:41:00

## 2018-01-01 RX ADMIN — ROCURONIUM BROMIDE 10 MG: 10 INJECTION, SOLUTION INTRAVENOUS at 02:46:00

## 2018-01-01 RX ADMIN — SODIUM CHLORIDE, SODIUM LACTATE, POTASSIUM CHLORIDE, CALCIUM CHLORIDE: 600; 310; 30; 20 INJECTION, SOLUTION INTRAVENOUS at 12:55:00

## 2018-01-01 RX ADMIN — PHENYLEPHRINE HCL 100 MCG: 10 MG/ML VIAL (ML) INJECTION at 04:42:00

## 2018-01-01 RX ADMIN — SODIUM CHLORIDE, SODIUM LACTATE, POTASSIUM CHLORIDE, CALCIUM CHLORIDE: 600; 310; 30; 20 INJECTION, SOLUTION INTRAVENOUS at 02:11:00

## 2018-01-01 RX ADMIN — PHENYLEPHRINE HCL 100 MCG: 10 MG/ML VIAL (ML) INJECTION at 21:05:00

## 2018-01-01 RX ADMIN — SODIUM CHLORIDE, SODIUM LACTATE, POTASSIUM CHLORIDE, CALCIUM CHLORIDE: 600; 310; 30; 20 INJECTION, SOLUTION INTRAVENOUS at 13:35:00

## 2018-01-01 RX ADMIN — HEPARIN SODIUM 7000 UNITS: 1000 INJECTION, SOLUTION INTRAVENOUS; SUBCUTANEOUS at 03:27:00

## 2018-01-01 RX ADMIN — SODIUM CHLORIDE, SODIUM LACTATE, POTASSIUM CHLORIDE, CALCIUM CHLORIDE: 600; 310; 30; 20 INJECTION, SOLUTION INTRAVENOUS at 14:30:00

## 2018-01-01 RX ADMIN — PHENYLEPHRINE HCL 100 MCG: 10 MG/ML VIAL (ML) INJECTION at 21:25:00

## 2018-01-01 RX ADMIN — SODIUM CHLORIDE, SODIUM LACTATE, POTASSIUM CHLORIDE, CALCIUM CHLORIDE: 600; 310; 30; 20 INJECTION, SOLUTION INTRAVENOUS at 21:40:00

## 2018-01-01 RX ADMIN — LIDOCAINE HYDROCHLORIDE 50 MG: 10 INJECTION, SOLUTION EPIDURAL; INFILTRATION; INTRACAUDAL; PERINEURAL at 14:30:00

## 2018-01-01 RX ADMIN — SODIUM CHLORIDE, SODIUM LACTATE, POTASSIUM CHLORIDE, CALCIUM CHLORIDE: 600; 310; 30; 20 INJECTION, SOLUTION INTRAVENOUS at 04:02:00

## 2018-01-01 RX ADMIN — LIDOCAINE HYDROCHLORIDE 50 MG: 10 INJECTION, SOLUTION EPIDURAL; INFILTRATION; INTRACAUDAL; PERINEURAL at 12:42:00

## 2018-01-01 RX ADMIN — SODIUM CHLORIDE, SODIUM LACTATE, POTASSIUM CHLORIDE, CALCIUM CHLORIDE: 600; 310; 30; 20 INJECTION, SOLUTION INTRAVENOUS at 12:40:00

## 2018-01-01 RX ADMIN — PHENYLEPHRINE HCL 50 MCG: 10 MG/ML VIAL (ML) INJECTION at 03:38:00

## 2018-01-01 RX ADMIN — LIDOCAINE HYDROCHLORIDE 50 MG: 10 INJECTION, SOLUTION EPIDURAL; INFILTRATION; INTRACAUDAL; PERINEURAL at 13:40:00

## 2018-01-01 RX ADMIN — PHENYLEPHRINE HCL 50 MCG: 10 MG/ML VIAL (ML) INJECTION at 03:54:00

## 2018-01-01 RX ADMIN — PHENYLEPHRINE HCL 50 MCG: 10 MG/ML VIAL (ML) INJECTION at 04:28:00

## 2018-01-01 RX ADMIN — PHENYLEPHRINE HCL 100 MCG: 10 MG/ML VIAL (ML) INJECTION at 04:13:00

## 2018-01-01 RX ADMIN — PHENYLEPHRINE HCL 50 MCG: 10 MG/ML VIAL (ML) INJECTION at 03:36:00

## 2018-01-01 RX ADMIN — PHENYLEPHRINE HCL 100 MCG: 10 MG/ML VIAL (ML) INJECTION at 20:55:00

## 2018-01-01 RX ADMIN — CLINDAMYCIN PHOSPHATE 600 MG: 150 INJECTION, SOLUTION INTRAVENOUS at 21:07:00

## 2018-01-01 RX ADMIN — PHENYLEPHRINE HCL 100 MCG: 10 MG/ML VIAL (ML) INJECTION at 04:45:00

## 2018-01-01 RX ADMIN — SODIUM CHLORIDE, SODIUM LACTATE, POTASSIUM CHLORIDE, CALCIUM CHLORIDE: 600; 310; 30; 20 INJECTION, SOLUTION INTRAVENOUS at 13:51:00

## 2018-01-01 RX ADMIN — SODIUM CHLORIDE, SODIUM LACTATE, POTASSIUM CHLORIDE, CALCIUM CHLORIDE: 600; 310; 30; 20 INJECTION, SOLUTION INTRAVENOUS at 14:35:00

## 2018-01-01 RX ADMIN — SODIUM CHLORIDE, SODIUM LACTATE, POTASSIUM CHLORIDE, CALCIUM CHLORIDE: 600; 310; 30; 20 INJECTION, SOLUTION INTRAVENOUS at 20:51:00

## 2018-01-05 ENCOUNTER — PRIOR ORIGINAL RECORDS (OUTPATIENT)
Dept: OTHER | Age: 83
End: 2018-01-05

## 2018-01-10 NOTE — PATIENT INSTRUCTIONS
Continue current medications    Remain vigilant with adherence to low sodium diet    Follow up appointment with Dr. Jose Greene 2/9/18    Monitor yourself for signs and symptoms of fluid overload, increased shortness of breath, difficulty breathing when laying

## 2018-01-10 NOTE — PROGRESS NOTES
Jhony Acosta Patient Status:  Outpatient    3/1/1925 MRN [id number]   Location MD Sylvia Hernandez MD       Negrito Holden is a 80year old female who presents to clinic for BMP-     /70   Pulse 94   Wt 170 lb (77.1 kg)   SpO2 90%   BMI 33.20 kg/m²     Hospital D/C wt: 164  Clinic weights: 1) 176 2) 176 3) 172 4) 169 5) 170 6) 175 7) 173 8) 170  Home Wt:              2)830                   General appearance: alert, eron base, otherwise clear,  Soft abdomen, 1+ LE edema bilaterally to knees, soft. Moving to [place] on 1/16/18. Patient is not aware of this at this time. BMP stable.  Continue Torsemide to 40 mg in am and 20 mg in pm  KDur 20 meq tolerated, with goal of doing moderate aerobic exercise like walking for about 30 minutes 5 days per week. Start by walking at a slow to moderate pace for 5-10 minutes 2-3 times a day. Pace your activity to prevent shortness of breath or fatigue.  Stop exer

## 2018-01-19 ENCOUNTER — PRIOR ORIGINAL RECORDS (OUTPATIENT)
Dept: OTHER | Age: 83
End: 2018-01-19

## 2018-01-19 NOTE — TELEPHONE ENCOUNTER
Son phoned. Patient is now living at [facility].   Needs Torsemide dosing faxed to nursing staff as her Torsemide prescription on the bottle states torsemide 40 mg twice daily, instead of Torsemide 40 mg in am and 20 mg in pm.  During

## 2018-01-21 PROBLEM — I50.9 ACUTE ON CHRONIC CONGESTIVE HEART FAILURE, UNSPECIFIED CONGESTIVE HEART FAILURE TYPE: Status: ACTIVE | Noted: 2018-01-01

## 2018-01-21 PROBLEM — B97.89 VIRAL RESPIRATORY ILLNESS: Status: ACTIVE | Noted: 2018-01-01

## 2018-01-21 PROBLEM — J98.8 VIRAL RESPIRATORY ILLNESS: Status: ACTIVE | Noted: 2018-01-01

## 2018-01-21 PROBLEM — J44.1 COPD WITH ACUTE EXACERBATION (HCC): Status: ACTIVE | Noted: 2018-01-01

## 2018-01-21 NOTE — CONSULTS
Pulmonary/Critical Care Consult Note    HPI:   Diana Zuniga is a 80year old female with Patient presents with:  Cough/URI    Leo Lane MD    Pt is a 79 yo with COPD, CHF, Afib, RA, CAMRYN, HTN and other med prob who pres tend to ER with cough and SOB.  P nebulizer solution              Allergies:    Crestor [Rosuvastat*      Digitek [Digoxin]         Iodine I 131 Tositu*      Latex                     Lisinopril                Peanuts                   Penicillins               Pollen                    Pr AECOPD  But c/b CHF  ?  aspiration  Plan Rx COPD   Nebs   steroids   Iv  Abx for now   tamiflu   PCT in AM    CHF  Severe diastolic  Plan Demadex   Follow I/Os   Cards consult    Flu  Plan tamiflu    Confusion  Awake and alert  No significant electrolyte ab

## 2018-01-21 NOTE — PROGRESS NOTES
80year old  Admitted for evaluation of cough, shortness of breath,increased weakness. . Condition is stable .  For details see H & P.

## 2018-01-21 NOTE — ED NOTES
Pt to ED via EMS for cough and shortness of breath since yesterday. Cough is non-productive. Denies fever, chills. States the cough is causing the shortness of breath.

## 2018-01-22 NOTE — HISTORICAL OFFICE NOTE
Alethea Rivas  : 1925  ACCOUNT:  [id number]  [phone]  PCP: Dr. Mo Valencia     TODAY'S DATE: 2018  DICTATED BY:  PACO Caro]      CHIEF COMPLAINT: [Followup of .  Heart failure, congestive and Followup of Hypertension.]    HPI:    [O HEM/LYMPH: denies easy bruising. ALL: no new food or enviornmental allergies.       PAST HISTORY: hypothyroidism    PAST CV HISTORY: atrial fibrillation, BM x2, congestive heart failure, hypertension and PTCA/Stent January 2014    FAMILY HISTORY: Negative f continued at and increased dose of 40 mg in the morning 20 mg at night with appropriate potassium replacement. I will call the son or the caregiver once a days blood test results are evaluated.   The patient is scheduled to follow-up with the heart failure 04/16/15 Requip                1MG       1 tablet daily                           04/16/15 TraMADol HCl          50MG      As needed/As directed                    12/10/13 Acetaminophen         500MG     as needed lightheadedness or dizziness. HEM/LYMPH: denies easy bruising. ALL: no new food or enviornmental allergies.       PAST HISTORY: hypothyroidism    PAST CV HISTORY: atrial fibrillation, BM x2, congestive heart failure, hypertension and PTCA/Stent January 2014 bare metal stent x2, 1/2014  6.  Hypertension      PLAN:  [00]  Continue current medications  CHF education  Follow up with APN in 4 weeks and with me in 2 months    PRESCRIPTIONS:   11/30/17 *Magnesium Oxide      400 (240  ONE TABLET BY MOUTH TWO TIMES A D

## 2018-01-22 NOTE — PROGRESS NOTES
[facility]    Progress Note    Shelley Nelson Patient Status:  Inpatient    3/1/1925 MRN [id number]   Location [facility] 3W/SW Attending Lawrence Morris MD   Hosp Day # 1 PCP South Aquino MD     Subjective:     Constitutional: P TROP 0.01 01/21/2018       Xr Chest Ap Portable  (cpt=71045)    Result Date: 1/21/2018  CONCLUSION:  Stable chest demonstrating cardiomegaly, borderline pulmonary vascular congestion, and bibasilar atelectasis without radiographically evident acute intra

## 2018-01-22 NOTE — SLP NOTE
ADULT SWALLOWING EVALUATION    ASSESSMENT    ASSESSMENT/OVERALL IMPRESSION:  Pt seen for bedside swallow evaluation secondary to increased shortness of breath and coughing.   The pt was seen for bedside swallow evaluation while sitting upright in bed self f COPD with acute exacerbation (HCC)  Active Problems:    Atrial fibrillation, persistent (HCC)    Viral respiratory illness    Acute on chronic congestive heart failure, unspecified congestive heart failure type [facility])      Past Medical History  Past Medical of Swallow: Impaired  Laryngeal Elevation Timing: Appears impaired  Laryngeal Elevation Strength: Appears impaired  Laryngeal Elevation Coordination: Appears impaired  (Please note: Silent aspiration cannot be evaluated clinically.  Videofluoroscopic Swallo

## 2018-01-22 NOTE — PROGRESS NOTES
[facility] Pharmacy Note:  Renal Adjustment for oseltamivir (TAMIFLU)    Jaleesa Aquino is a 80year old female who has been prescribed oseltamivir (TAMIFLU) 75 mg every 12 hrs. CrCl is estimated creatinine clearance is 27.3 mL/min (based on SCr of 1.04 mg/dL).  s

## 2018-01-22 NOTE — PROGRESS NOTES
Patient presented from the ED alert, confused, oriented only to self. The ED nurse told me in report the patient was A&Ox4. Vitals upon arrival: /84, , temp 98.4, 20 RR, 93% on 3L oxygen.  Dr Brennan Donato was called and confirmed the patient was conf

## 2018-01-22 NOTE — H&P
[facility] Patient Status:  Inpatient    3/1/1925 MRN [id number]   Location [facility] 3W/SW Attending Chester Crenshaw MD   Hosp Day # 0 PCP Candance Shack, MD     Date:  2018  Date of Admi taking differently: Take 20 mg by mouth BID (Diuretic). Taking 60 mg in am and 20 mg in pm ) Disp: 120 tablet Rfl: 0 Taking   Potassium Chloride ER 10 MEQ Oral Tab CR Take 3 tablets (30 mEq total) by mouth 2 (two) times daily.  Disp: 120 tablet Rfl: 1 Christ Physical Exam:   Physical Exam    HENT:   Head: Normocephalic. Eyes: EOM are normal. Pupils are equal, round, and reactive to light. Neck: Neck supple. Cardiovascular: Normal rate and regular rhythm. Edema present.   Pulmonary/Chest: Effort nor

## 2018-01-22 NOTE — CM/SW NOTE
Addend 203p: Resume orders have been entered. ----------------------------------------------------------------------------  Rich Gordon from [facility] notified SW that pt is current w/ them.      *Resume [facility] orders needed prior to dischargeChernora Erazo, [address]

## 2018-01-22 NOTE — PROGRESS NOTES
[facility] AND [facility]  Cardiology Progress Note    Fabriciozoey Baez Patient Status:  Inpatient    3/1/1925 MRN [id number]   Location [facility] 3W/SW Attending Siri Talavera MD   Hosp Day # 1 PCP Dawson Hobson MD       Subjective: Still short of breath I 131 Tositu*      Latex                     Lisinopril                Peanuts                   Penicillins               Pollen                    Pregabalin                Raloxifene                Shellfish-Derived P*        Medications:    Current Fac

## 2018-01-22 NOTE — PROGRESS NOTES
[facility] [facility]    RRT Note    Krystal Whalen Patient Status:  Inpatient    3/1/1925 MRN [id number]   Location [facility] 3W/SW Attending Chester Crenshaw MD   Hosp Day # 0 PCP Candance Shack, MD       Subjective:   Krystal Whalen is a(n) 80 64%  -placed on non-rebreather - 100%  -cont oxygen  -stat nebulizer  -start solumedrol- solumedrol 60mg iv given in ED  -start bipap  -pulmonary eval  -monitor--> may need CCU will cont to monitor    A.  Fib  -with rvr  -on oral diltiazem  -given cardizem

## 2018-01-22 NOTE — PROGRESS NOTES
[facility]    Progress Note    Deysi Winkler Patient Status:  Inpatient    3/1/1925 MRN [id number]   Location [facility] 3W/SW Attending Washington Hardwick MD   Hosp Day # 1 PCP Salo Chavis MD       Interval History:   Feels better 01/21/18   1152  01/22/18   0557   GLU  96  171*   BUN  20  21*   CREATSERUM  1.04  0.88   GFRAA  60  >60   GFRNAA  50*  60   CA  8.8  8.4*   NA  138  141   K  3.5  3.6   CL  97  98   CO2  30  33*   MG   --   2.1     Recent Labs   Lab  01/22/18   0557   A

## 2018-01-22 NOTE — ED PROVIDER NOTES
Patient Seen in: [facility] CLIN[facility] 3w/sw    History   Patient presents with:  Cough/URI    Stated Complaint: cough    HPI    80year old female with multiple medical problems including chronic atrial fibrillation, diastolic congestive heart failure, hype 157.5 cm (5' 2\")   Wt 78.2 kg   SpO2 99%   BMI 31.55 kg/m²         Physical Exam   Constitutional: She is oriented to person, place, and time. She appears well-developed and well-nourished. She is cooperative. Non-toxic appearance. No distress.    HENT: A H1-2009 and Flumist vaccine.    CBC W/ DIFFERENTIAL - Abnormal; Notable for the following:     RBC 3.62 (*)     HGB 10.4 (*)     HCT 32.6 (*)     MCHC 31.8 (*)     MPV 7.2 (*)     All other components within normal limits   TROPONIN I - Normal   CBC WITH consult    Disposition and Plan     Clinical Impression:  [facility]  (primary encounter diagnosis)  Viral respiratory illness  Acute on chronic congestive heart failure, unspecified congestive heart failure type [facility]    Dispositio

## 2018-01-22 NOTE — CONSULTS
[facility]  Cardiology Consultation    Gilberto Reyes Patient Status:  Inpatient    3/1/1925 MRN [id number]   Location [facility] 3W/SW Attending Cindy Batista MD   Hosp Day # 0 PCP Yeyo Huerta MD     Reason for Consultation:  Shortness Medications:    Current Facility-Administered Medications:   •  albuterol sulfate (VENTOLIN) (2.5 MG/3ML) 0.083% nebulizer solution, , ,   •  ipratropium-albuterol (DUONEB) nebulizer solution 3 mL, 3 mL, Nebulization, Q6H PRN  •  furosemide (LASIX) 10 cardiac medications  We will follow along. Thank you for allowing me to participate in the care of your patient.     Az Jaimes MD  1/21/2018  7:07 PM

## 2018-01-22 NOTE — PROGRESS NOTES
[facility] Pharmacy Note:  Renal Dose Adjustment for Tramadol Staciann Section)    Elle Tao has been prescribed Tramadol (ULTRAM) 50 mg orally every 6 hours as needed for pain. Estimated Creatinine Clearance: 27.3 mL/min (based on SCr of 1.04 mg/dL).     Her calcul

## 2018-01-23 NOTE — PROGRESS NOTES
[facility]  Cardiology Progress Note    Marquis Langdon Patient Status:  Inpatient    3/1/1925 MRN [id number]   Location [facility] 3W/SW Attending Wendolyn Sever, MD   Hosp Day # 2 PCP Lorena Hall MD       Subjective: Still short of breath 3.3*       Recent Labs   Lab  01/21/18   1152   TROP  0.01       Allergies:     Crestor [Rosuvastat*      Digitek [Digoxin]         Iodine I 131 Tositu*      Latex                     Lisinopril                Peanuts                   Penicillins 8:44 AM  [facility]

## 2018-01-23 NOTE — PROGRESS NOTES
[facility]    Progress Note    Krystal Whalen Patient Status:  Inpatient    3/1/1925 MRN [id number]   Location [facility] 3W/SW Attending Chester Crenshaw MD   Hosp Day # 2 PCP Candance Shack, MD     Subjective:     Constitutional: N KWASI Fontana MD  1/23/2018

## 2018-01-23 NOTE — PROGRESS NOTES
[facility]    [facility]    Tariq Danger Patient Status:  Inpatient    3/1/1925 MRN [id number]   Location [facility] 3W/SW Attending Imani Suarez MD   Hosp Day # 2 PCP Lyssa Harris MD       Interval History:   Not in distre 14.6  14.7  14.6   WBC  8.1  6.4  9.2   PLT  283  242  269     Recent Labs   Lab  01/21/18   1152  01/22/18   0557  01/23/18   0639   GLU  96  171*  193*   BUN  20  21*  38*   CREATSERUM  1.04  0.88  0.94   GFRAA  60  >60  >60   GFRNAA  50*  60  56*   CA

## 2018-01-23 NOTE — PHYSICAL THERAPY NOTE
PHYSICAL THERAPY EVALUATION - INPATIENT     Room Number: 337/337-A  Evaluation Date: 1/23/2018  Type of Evaluation: Initial   Physician Order: PT Eval and Treat    Presenting Problem: COPD, influenza  Reason for Therapy: Mobility Dysfunction and Discha supervised setting; Other (Comment) (Recently moved into [facility], caregivers. )    PLAN  PT Treatment Plan: Bed mobility; Endurance; Patient education;Gait training;Strengthening;Transfer training  Rehab Potential : Good  Frequency (Obs): 5x/we PAIN ASSESSMENT  Rating: Unable to rate  Location: bilateral feet  Management Techniques: Activity promotion;Repositioning    COGNITION  · Person. Pleasant. With cuing able to state she is in hospital. Follows commands given extra time.      RANGE OF MO )  Stoop/Curb Assistance: Not tested       Bed Mobility: MOD A x 2    Transfers: MOD A of 1    Exercise/Education Provided:  Bed mobility  Sitting balance  Transfers  Toileting on bed pan  OT provided washing feet at patient request.     Patient End of Ses

## 2018-01-23 NOTE — PROGRESS NOTES
01/23/18 1448   Clinical Encounter Type   Visited With Patient   Routine Visit Introduction  (CONSULT)   Continue Visiting Yes   Referral From Nurse   Patient Spiritual Encounters   Spiritual Assessment Completed 1  (Pt distressed because son moved her

## 2018-01-23 NOTE — OCCUPATIONAL THERAPY NOTE
OCCUPATIONAL THERAPY EVALUATION - INPATIENT     Room Number: 337/337-A  Evaluation Date: 1/23/2018  Type of Evaluation: Initial  Presenting Problem: Influenza with COPD.      Physician Order: IP Consult to Occupational Therapy  Reason for Therapy: ADL/IADL Acute on chronic congestive heart failure, unspecified congestive heart failure type [facility])      Past Medical History  Past Medical History:   Diagnosis Date   • Arthritis    • Chronic a-fib (HCC)    • Diastolic CHF, chronic (HCC)    • Diverticulitis    • G patient currently need…  -   Putting on and taking off regular lower body clothing?: A Lot  -   Bathing (including washing, rinsing, drying)?: A Lot  -   Toileting, which includes using toilet, bedpan or urinal? : A Lot  -   Putting on and taking off regul

## 2018-01-23 NOTE — H&P
[facility]    Report of Consultation    Deysi Winkler Patient Status:  Inpatient    3/1/1925 MRN [id number]   Location [facility] 3W/SW Attending Washington Hardwick MD   [facility] Day # 2 PCP Salo Chavis MD     Date of Admission:   file.    Social History  Smoking status: Former Smoker                                                              Packs/day: 0.00      Years: 0.00         Types: Cigarettes     Quit date: 2/20/1970  Smokeless tobacco: Never Used                      Alco mouth before breakfast.   apixaban 2.5 MG Oral Tab Take 5 mg by mouth 2 (two) times daily. DiltiaZEM HCl ER Coated Beads 240 MG Oral Capsule SR 24 Hr Take 240 mg by mouth daily.    magnesium oxide 400 MG Oral Tab Take 1 tablet (400 mg total) by mouth 2 (t Results  Component Value Date   WBC 9.2 01/23/2018   HGB 8.9 (L) 01/23/2018   HCT 28.4 (L) 01/23/2018    01/23/2018   CREATSERUM 0.94 01/23/2018   BUN 38 (H) 01/23/2018    01/23/2018   K 3.2 (L) 01/23/2018   CL 96 01/23/2018   CO2 35 (H) 01/23

## 2018-01-23 NOTE — RESPIRATORY THERAPY NOTE
Patient refused nebulizer therapy. [facility] has educated the patient on the purpose of and need for this therapy as well as potential negative outcomes associated with deferring treatment. Despite education, patient maintains refusal. Changed to Q6 PRN PER BDP.

## 2018-01-24 NOTE — PROGRESS NOTES
Tramadol  dosing had been previously adjusted by pharmacy due to renal insufficiency for Denia Montero. Estimated Creatinine Clearance: 32.6 mL/min (based on SCr of 0.87 mg/dL).     Due to improved renal function, the dose of tramadol  has been adjusted

## 2018-01-24 NOTE — H&P
PRE-PROCEDURE UPDATE    HPI: Marin Armenta is a 80year old female. 3/1/1925. Patient presents for an Esophagogastroduodenoscopy.     ALLERGIES:   Crestor [Rosuvastat*      Digitek [Digoxin]         Iodine I 131 Tositu*      Latex                     [facility] [employer]

## 2018-01-24 NOTE — OPERATIVE REPORT
ESOPHAGOGASTRODUODENOSCOPY REPORT    Patient Name:  Gayle Rosa Record #: [id number]  YOB: 1925  Date of Procedure: 1/24/2018    Referring physician: Mike Pollock MD     EGD with cautery    Surgeon:  Sade Ross MD    Pre-o

## 2018-01-24 NOTE — PROGRESS NOTES
[facility]    Progress Note    Elle Tao Patient Status:  Inpatient    3/1/1925 MRN [id number]   Location [facility] 3W/SW Attending Emerita Bender MD   Hosp Day # 3 PCP Rosario Storm MD     Subjective:   Subjective:  Breath discharge, patient will require TBD. Armand Bansal.  [facility] MD  1/24/2018

## 2018-01-24 NOTE — ANESTHESIA POSTPROCEDURE EVALUATION
Patient: Marin Armenta    Procedure Summary     Date:  01/24/18 Room / Location:  [facility] ENDOSCOPY 05 / [facility] ENDOSCOPY    Anesthesia Start:  8412 Anesthesia Stop:  6084    Procedure:  ESOPHAGOGASTRODUODENOSCOPY (EGD) (N/A ) Diagnosis:       Gastrointestinal he

## 2018-01-24 NOTE — PLAN OF CARE
Patient Centered Care    • Patient preferences are identified and integrated in the patient's plan of care Not Progressing        Patient/Family Goals    • Patient/Family Long Term Goal Not Progressing    • Patient/Family Short Term Goal Not Progressing

## 2018-01-24 NOTE — OCCUPATIONAL THERAPY NOTE
OCCUPATIONAL THERAPY TREATMENT NOTE - INPATIENT     Room Number: 337/337-A          Presenting Problem: Influenza with COPD.      Problem List  Principal Problem:    COPD with acute exacerbation [facility])  Active Problems:    Atrial fibrillation, persistent (HC regular upper body clothing?: A Little  -   Taking care of personal grooming such as brushing teeth?: A Little (wtih set up )  -   Eating meals?: None    AM-PAC Score:  Score: 16  Approx Degree of Impairment: 53.32%  Standardized Score (AM-PAC Scale): 35. 9

## 2018-01-24 NOTE — PROGRESS NOTES
[facility]OSP - [facility]    Progress Note    Diana Zuniga Patient Status:  Inpatient    3/1/1925 MRN [id number]   Location [facility] 3W/SW Attending Ami Crowell MD   Hosp Day # 3 PCP Leo Lane MD     Subjective:   Subjective:breathin

## 2018-01-24 NOTE — PROGRESS NOTES
[facility] AND [facility]  Cardiology Progress Note    Diana Zuniga Patient Status:  Inpatient    3/1/1925 MRN [id number]   Location [facility] 3W/SW Attending Ami Crowell MD   Hosp Day # 3 PCP Leo Lane MD       Subjective: Still mildly SOB and 20  21*  38*   --   39*   CREATSERUM  1.04  0.88  0.94   --   0.87   CA  8.8  8.4*  8.4*   --   8.1*   MG   --   2.1   --    --   2.0   GLU  96  171*  193*   --   155*       Recent Labs   Lab  01/22/18   0557   ALT  16   AST  31   ALB  3.3*       Recent La off eliquis due to BRBPR, Hgb 13 in July and now 9. GI following, planning EGD. CAD- s/p BMS in 2014, no DAPT required.       [address] [place], 01/23/18, 8:44 AM  [facility]

## 2018-01-24 NOTE — ANESTHESIA PREPROCEDURE EVALUATION
Anesthesia PreOp Note    HPI:     Emmy North is a 80year old female who presents for preoperative consultation requested by: Jesus Crawford MD    Date of Surgery: 1/21/2018 - 1/24/2018    Procedure(s):  ESOPHAGOGASTRODUODENOSCOPY (EGD)  Indication: Sciatica         Date Noted: 06/20/2013        Past Medical History:   Diagnosis Date   • Arthritis    • Chronic a-fib (HCC)    • Diastolic CHF, chronic (HCC)    • Diverticulitis    • Glaucoma    • HLD (hyperlipidemia)    • Hypothyroidism    • Macular dege mouth nightly. Disp:  Rfl:  Taking   Cholecalciferol (VITAMIN D) 1000 UNITS Oral Cap Take 1,000 Units by mouth daily. Disp:  Rfl:  Taking   rOPINIRole HCl (REQUIP) 1 MG Oral Tab Take 1 mg by mouth nightly.    Disp:  Rfl:  Taking   gabapentin (NEURONTIN) 2047     No current [facility]-ordered outpatient prescriptions on file.       Crestor [Rosuvastat*      Digitek [Digoxin]         Iodine I 131 Tositu*      Latex                     Lisinopril                Peanuts                   Penicillins               Po Temp:  98.1 °F (36.7 °C) 98.3 °F (36.8 °C)    TempSrc:  Oral Oral    SpO2:  98% 97% 98%   Weight: 71.4 kg (157 lb 4.8 oz)      Height:            Anesthesia ROS/Med Hx and Physical Exam      Airway   Mallampati: I  Neck ROM: full  Dental - normal exam

## 2018-01-25 PROBLEM — K31.811 AVM (ARTERIOVENOUS MALFORMATION) OF DUODENUM, ACQUIRED WITH HEMORRHAGE: Status: ACTIVE | Noted: 2018-01-01

## 2018-01-25 PROBLEM — D50.9 IRON DEFICIENCY ANEMIA: Status: ACTIVE | Noted: 2018-01-01

## 2018-01-25 NOTE — OCCUPATIONAL THERAPY NOTE
OCCUPATIONAL THERAPY TREATMENT NOTE - INPATIENT     Room Number: 337/337-A          Presenting Problem: Influenza with COPD.      Problem List  Principal Problem:    COPD with acute exacerbation [facility])  Active Problems:    Atrial fibrillation, persistent (HC drying)?: A Lot  -   Toileting, which includes using toilet, bedpan or urinal? : A Lot  -   Putting on and taking off regular upper body clothing?: A Little  -   Taking care of personal grooming such as brushing teeth?: A Little  -   Eating meals?: None

## 2018-01-25 NOTE — PROGRESS NOTES
[facility]    Cardiology Progress Note    Daljit Whittaker Patient Status:  Inpatient    3/1/1925 MRN [id number]   Location [facility] 3W/SW Attending Beth Garcia MD   Hosp Day # 4 PCP Ruby Jacobo MD     Primary Cardiologist:  1/24/18  History of NSTEMI 2014; s/p PCI diagonal 2.25 mm BMS and Cx 3.5 mm BMS  Normal EF echo 1/24/18 reviewed  SSS s/p DC PPM  Afib previously on eliquis, stopped due to GIB, rates controlled on diltiazem 240mg Daily  Hypothyroid     Plan:  HFpEF- resta

## 2018-01-25 NOTE — HOME CARE LIAISON
PATIENT IS ACTIVE WITH [facility] AND ORDERS RECEIVED TO RESUME SERVICES. PLEASE NOTE: PATIENT LIVING AT [facility]: [address] [place], [address] [place] [address]. P: ([phone]).  DISCUSSED PLANS WITH RESUMING CARE

## 2018-01-25 NOTE — PROGRESS NOTES
Pulmonary/Critical Care Follow Up Note    HPI:   Kathleen Baez is a 80year old female with Patient presents with:  Cough/URI      PCP Dawson Hosbon MD  Admission Attending Brittani Urias 15 Day #4    Feeling better  Less sob  Less cough  No fev Lisinopril                Peanuts                   Penicillins               Pollen                    Pregabalin                Raloxifene                Shellfish-Derived P*            PHYSICAL EXAM:   Blood pressure 142/82, pulse 99, temperature 98. 6

## 2018-01-25 NOTE — SLP NOTE
SPEECH DAILY NOTE - INPATIENT    ASSESSMENT & PLAN   ASSESSMENT  Patient seen to monitor tolerance of PO diet. Trials of current diet presented. Patient fed herself. No clinical signs of dysphagia noted including no clinical signs of aspiration.   Will d Keke Mejía MA/[facility]SLP  Speech Language Pathologist  Ryan. [phone]

## 2018-01-25 NOTE — PHYSICAL THERAPY NOTE
PHYSICAL THERAPY TREATMENT NOTE - INPATIENT    Room Number: 337/337-A       Presenting Problem: COPD, influenza    Problem List  Principal Problem:    COPD with acute exacerbation ([facility]  Active Problems:    [facility], persistent ([facility]    Viral r Static Standing: Fair  Dynamic Standing: Fair    ACTIVITY TOLERANCE  O2 Saturation: 71%    AM-PAC '6-Clicks' INPATIENT SHORT FORM - BASIC MOBILITY  How much difficulty does the patient currently have. ..  -   Turning over in bed (including adjusting bedcl Min A to amb 10' with RW shuffling gait elevated fall risk   Goal #4     Goal #4   Current Status     Goal #5 Patient to demonstrate active participation with home activity/exercise instructions provided to patient in preparation for discharge.

## 2018-01-25 NOTE — PROGRESS NOTES
[facility]    Progress Note    Kathleen Baez Patient Status:  Inpatient    3/1/1925 MRN [id number]   Location [facility] 3W/SW Attending Siri Talavera MD   Hosp Day # 4 PCP Dawson Hobson MD       Interval History:   Feels better HGB  8.9*   < >  8.5*  8.5*  9.4*  9.3*  9.8*   HCT  28.4*   --   26.8*   --    --   31.2*   MCV  90.3   --   90.3   --    --   90.2   MCH  28.3   --   28.7   --    --   28.4   MCHC  31.3*   --   31.8*   --    --   31.4*   RDW  14.6   --   14.9   --    -

## 2018-01-25 NOTE — PROGRESS NOTES
[facility]    Progress Note    Carline Barros Patient Status:  Inpatient    3/1/1925 MRN [id number]   Location [facility] 3W/SW Attending Nicole Flynn MD   Hosp Day # 4 PCP Brooke Pena MD       Subjective:   Carline Barros is a(

## 2018-01-26 ENCOUNTER — PRIOR ORIGINAL RECORDS (OUTPATIENT)
Dept: OTHER | Age: 83
End: 2018-01-26

## 2018-01-26 NOTE — PROGRESS NOTES
[facility]    Cardiology Progress Note    Krystal Whalen Patient Status:  Inpatient    3/1/1925 MRN [id number]   Location [facility] 3W/SW Attending Chester Crenshaw MD   Hosp Day # 5 PCP Candance Shack, MD     Primary Cardiologist:  1/24/18  History of NSTEMI 2014; s/p PCI diagonal 2.25 mm BMS and Cx 3.5 mm BMS  Normal EF echo 1/24/18 reviewed  SSS s/p DC PPM  Afib previously on eliquis, stopped due to GIB, rates controlled on diltiazem 240mg Daily  Hypothyroid     Plan:  HFpEF- DC on

## 2018-01-26 NOTE — PROGRESS NOTES
[facility]    Progress Note    Vandana Hassan Patient Status:  Inpatient    3/1/1925 MRN [id number]   Location [facility] 3W/SW Attending Jeff Lawrence MD   Hosp Day # 5 PCP Mike Jeffrey MD       Interval History:   Feels good, a --   3.46*   --   3.55*   HGB  8.5*  8.5*   < >  9.8*  10.5*  10.1*   HCT  26.8*   --   31.2*   --   31.4*   MCV  90.3   --   90.2   --   88.6   MCH  28.7   --   28.4   --   28.4   MCHC  31.8*   --   31.4*   --   32.1   RDW  14.9   --   14.5   --   14.4

## 2018-01-26 NOTE — CM/SW NOTE
Addend 1214x: Adilson requested clincal updates.  MARK faxed updates to [phone].   ----------------------------------------------------------------------------  RN told MARK that pt is cleared to discharge today and will need ambulance transport, due to [facility]

## 2018-01-26 NOTE — PROGRESS NOTES
[facility]    Progress Note    Jorge Alberto Chou Patient Status:  Inpatient    3/1/1925 MRN [id number]   Location [facility] 3W/SW Attending Marina Woodard MD   Hosp Day # 5 PCP Greg Pascual MD     Subjective:     Neurological: Joyce West MD  1/26/2018

## 2018-01-26 NOTE — PLAN OF CARE
Problem: Patient/Family Goals  Goal: Patient/Family Long Term Goal  Patient's Long Term Goal: To go home    Interventions:  -   - See additional Care Plan goals for specific interventions    Outcome: Progressing    Goal: Patient/Family Short Term Goal  Raoul Jorge environment to reduce risk of injury  - Provide assistive devices as appropriate  - Consider OT/PT consult to assist with strengthening/mobility  - Encourage toileting schedule   Outcome: Progressing      Comments: Patient presents with VSS.  On oxygen via

## 2018-02-01 NOTE — DISCHARGE SUMMARY
[facility] - [facility]    Discharge Summary    Leif Taylor Patient Status:  Inpatient    3/1/1925 MRN [id number]   Location [facility] 3W/SW Attending No att. providers found   [facility] Day # 5 PCP Mike Pollcok MD     Date of Admission:  malformation that stopped after   Cauterization. Continued to improve and was discharged  Home, all meds resumed.     Complications: None    Consultants     Provider Role Specialty    Kym Sim MD Consulting Physician  [address] [place] [address] Oral Tab  Take 81 mg by mouth daily. , Historical    Ferrous Sulfate 325 (65 FE) MG Oral Tab  Take 325 mg by mouth daily with breakfast.  , Historical, R-0    Pantoprazole Sodium (PROTONIX) 40 MG Oral Tab EC  Take 1 tablet by mouth daily. , Historical, R-2

## 2018-02-07 ENCOUNTER — PRIOR ORIGINAL RECORDS (OUTPATIENT)
Dept: OTHER | Age: 83
End: 2018-02-07

## 2018-02-07 PROBLEM — K92.2 GASTROINTESTINAL HEMORRHAGE, UNSPECIFIED GASTROINTESTINAL HEMORRHAGE TYPE: Status: ACTIVE | Noted: 2018-01-01

## 2018-02-07 PROBLEM — D64.9 ANEMIA: Status: ACTIVE | Noted: 2018-01-01

## 2018-02-07 PROBLEM — N17.9 ACUTE KIDNEY INJURY (HCC): Status: ACTIVE | Noted: 2018-01-01

## 2018-02-07 PROBLEM — E87.1 HYPONATREMIA: Status: ACTIVE | Noted: 2018-01-01

## 2018-02-07 PROBLEM — R79.89 AZOTEMIA: Status: ACTIVE | Noted: 2018-01-01

## 2018-02-07 PROBLEM — K92.2 GASTROINTESTINAL HEMORRHAGE: Status: ACTIVE | Noted: 2018-01-01

## 2018-02-07 NOTE — ED PROVIDER NOTES
Patient Seen in: [facility] AND [facility] Emergency Department    History   Patient presents with:  Abnormal Result (metabolic, cardiac)    Stated Complaint: low hgb 6.1, cp pain, right foot pain    HPI    The patient is a 58-year-old female sent from nursing [facility]    Positive for stated complaint: low hgb 6.1, cp pain, right foot pain  Other systems are as noted in HPI. Constitutional and vital signs reviewed. All other systems reviewed and negative except as noted above.     Physical Exam   ED Triage V components within normal limits   CBC W/ DIFFERENTIAL - Abnormal; Notable for the following:     WBC 12.9 (*)     RBC 2.27 (*)     HGB 6.4 (*)     HCT 20.3 (*)     MCHC 31.3 (*)     RDW 16.6 (*)     MPV 6.7 (*)     All other components within normal limits Monitor: Pulse Readings from Last 1 Encounters:  02/07/18 : 70  , sinus, normal    Patient's vital signs remained stable. Case discussed with Dr. Lorena Rossi, [address] [place] [address] and Dr. Marcial. IV Protonix given in the ED.   Patient will be admitted for further man

## 2018-02-07 NOTE — HISTORICAL OFFICE NOTE
Bettina Landry  : 1925  ACCOUNT:  [id number]  [phone]  PCP: Dr. Jaswant Fontenot     TODAY'S DATE: 2018  DICTATED BY:  PACO Marcano]      CHIEF COMPLAINT: [Followup of .  Heart failure, congestive and Followup of Hypertension.]    HPI:    [O HEM/LYMPH: denies easy bruising. ALL: no new food or enviornmental allergies.       PAST HISTORY: hypothyroidism    PAST CV HISTORY: atrial fibrillation, BM x2, congestive heart failure, hypertension and PTCA/Stent January 2014    FAMILY HISTORY: Negative f continued at and increased dose of 40 mg in the morning 20 mg at night with appropriate potassium replacement. I will call the son or the caregiver once a days blood test results are evaluated.   The patient is scheduled to follow-up with the heart failure 04/16/15 Requip                1MG       1 tablet daily                           04/16/15 TraMADol HCl          50MG      As needed/As directed                    12/10/13 Acetaminophen         500MG     as needed lightheadedness or dizziness. HEM/LYMPH: denies easy bruising. ALL: no new food or enviornmental allergies.       PAST HISTORY: hypothyroidism    PAST CV HISTORY: atrial fibrillation, BM x2, congestive heart failure, hypertension and PTCA/Stent January 2014 bare metal stent x2, 1/2014  6.  Hypertension      PLAN:  [00]  Continue current medications  CHF education  Follow up with APN in 4 weeks and with me in 2 months    PRESCRIPTIONS:   11/30/17 *Magnesium Oxide      400 (240  ONE TABLET BY MOUTH TWO TIMES A D

## 2018-02-07 NOTE — ED INITIAL ASSESSMENT (HPI)
Pt with hgb resulted today at 6.1- states some chest pain, weakness, and right foot pain- deneis trauma. A./O x 4.

## 2018-02-07 NOTE — CONSULTS
[facility] HOSP - [facility]    Report of Consultation    Isaak Bruce Patient Status:  Emergency    3/1/1925 MRN [id number]   Location [address] Attending Cleo Hutchinson MD   Hosp Day # 0 PCP Jc Serrano MD     Date of date: REMOVAL GALLBLADDER  No date: TOTAL HIP REPLACEMENT      Comment: Bilateral  No date: WRIST FRACTURE SURGERY Right    Family History  No family history on file. Social History  Patient Guardian Status:  Not on file.     Other Topics            Conc cachexia      Results:     Laboratory Data:    Lab Results  Component Value Date   WBC 12.9 (H) 02/07/2018   HGB 6.4 (LL) 02/07/2018   HCT 20.3 (L) 02/07/2018    02/07/2018   CREATSERUM 1.33 02/07/2018   BUN 31 (H) 02/07/2018    (L) 02/07/2018 (CST): Tamanna Bahena MD on 1/21/2018 at 11:43          CONCLUSION:  Stable chest demonstrating cardiomegaly, borderline pulmonary vascular congestion, and bibasilar atelectasis without radiographically evident acute intrathoracic process.             Impr

## 2018-02-07 NOTE — CONSULTS
[facility] AND [facility]  [facility] Cardiology Consult Note    Lakeshore Night Patient Status:  Emergency    3/1/1925 MRN [id number]   Location [address] Attending Leticia Botello MD   Hosp Day # 0 PCP Brooke Pena MD     80 year o • Restless leg syndrome    • Rheumatoid arthritis(714.0)    • Sleep apnea    • Thyroid disease    • Unspecified essential hypertension    • Wrist fracture     RT     Past Surgical History:  2014 or 2015: CARDIAC PACEMAKER PLACEMENT  01/2018: "[employer]D      Com

## 2018-02-08 PROBLEM — D50.0 ANEMIA DUE TO GI BLOOD LOSS: Status: ACTIVE | Noted: 2018-01-01

## 2018-02-08 NOTE — PLAN OF CARE
Problem: CARDIOVASCULAR - ADULT  Goal: Maintains optimal cardiac output and hemodynamic stability  INTERVENTIONS:  - Monitor vital signs, rhythm, and trends  - Monitor for bleeding, hypotension and signs of decreased cardiac output  - Evaluate effectivenes function  INTERVENTIONS:  - Assess bowel function  - Maintain adequate hydration with IV or PO as ordered and tolerated  - Evaluate effectiveness of GI medications  - Encourage mobilization and activity  - Obtain nutritional consult as needed  - Establish

## 2018-02-08 NOTE — OPERATIVE REPORT
ESOPHAGOGASTRODUODENOSCOPY REPORT    Patient Name:  Valeria Keita Record #: [id number]  YOB: 1925  Date of Procedure: 2/8/2018    Referring physician: Carmelita Dumont MD    Surgeon:  Alba Mckay.  Rashid Gregory MD    Pre-op diagnosis: Melena, GI

## 2018-02-08 NOTE — H&P
[address] [facility] Patient Status:  Inpatient    3/1/1925 MRN [id number]   Location [facility] 3W/SW Attending Damion Loredo MD   Hosp Day # 1 PCP Annalee Mckinley MD     Date:  2018  Date of Admis 0.5-2.5 (3) MG/3ML Inhalation Solution Take 3 mL by nebulization every 6 (six) hours as needed. Disp: 50 vial Rfl: 0 2/7/2018 at Unknown time   torsemide 20 MG Oral Tab Take 1 tablet (20 mg total) by mouth BID (Diuretic).  Taking 60 mg in am and 20 mg in pm 30 mL by mouth daily as needed. Disp:  Rfl:  More than a month at Unknown time   ondansetron 4 MG Oral Tablet Dispersible Take 4 mg by mouth every 8 (eight) hours as needed for Nausea.  Disp:  Rfl:  More than a month at Unknown time       Review of Systems: AM

## 2018-02-08 NOTE — ANESTHESIA POSTPROCEDURE EVALUATION
Patient: Emmy North    Procedure Summary     Date:  02/08/18 Room / Location:  [address] [place] [address] ENDOSCOPY 01 / [address] [place] [address] ENDOSCOPY    Anesthesia Start:  6567 Anesthesia Stop:      Procedure:  ESOPHAGOGASTRODUODENOSCOPY (EGD) (N/A ) Diagnosis:  (hiatal hernia)    Surgeon:

## 2018-02-08 NOTE — PROGRESS NOTES
80year old  Admitted for evaluation of Gastrointestinal hemorrhage, unspecified gastrointestinal hemorrhage type . Condition is stable.  . For details see H & P.

## 2018-02-08 NOTE — CM/SW NOTE
2/8/18 CM Discharge planning   Pt resides at [facility] and is current with [facility]. CM will continue to follow and assist with discharge needs pending medical course.    Trista Hamlin X [phone]

## 2018-02-08 NOTE — H&P (VIEW-ONLY)
[facility]    Report of Consultation    Shelley Nelson Patient Status:  Emergency    3/1/1925 MRN [id number]   Location [address] Attending Nella Mireles MD   Hosp Day # 0 PCP [place] MD     Date of date: REMOVAL GALLBLADDER  No date: TOTAL HIP REPLACEMENT      Comment: Bilateral  No date: WRIST FRACTURE SURGERY Right    Family History  No family history on file. Social History  Patient Guardian Status:  Not on file.     Other Topics            Conc cachexia      Results:     Laboratory Data:    Lab Results  Component Value Date   WBC 12.9 (H) 02/07/2018   HGB 6.4 (LL) 02/07/2018   HCT 20.3 (L) 02/07/2018    02/07/2018   CREATSERUM 1.33 02/07/2018   BUN 31 (H) 02/07/2018    (L) 02/07/2018 (CST): Nuno Cage MD on 1/21/2018 at 11:43          CONCLUSION:  Stable chest demonstrating cardiomegaly, borderline pulmonary vascular congestion, and bibasilar atelectasis without radiographically evident acute intrathoracic process.             Impr

## 2018-02-08 NOTE — PROGRESS NOTES
[facility] Pharmacy Note:  Renal Dose Adjustment for Tramadol Cristofer Dejesus    Nghia Dewitt has been prescribed Tramadol (ULTRAM) 50 mg orally every 6 hours as needed for pain. Estimated Creatinine Clearance: 26.3 mL/min (based on SCr of 1.08 mg/dL).     Her calcul

## 2018-02-08 NOTE — INTERVAL H&P NOTE
Pre-op Diagnosis: none given    The above referenced H&P was reviewed by Manjinder Reyes MD on 2/8/2018, the patient was examined and no significant changes have occurred in the patient's condition since the H&P was performed.   I discussed with the patien

## 2018-02-08 NOTE — ANESTHESIA PREPROCEDURE EVALUATION
Anesthesia PreOp Note    HPI:     Jorge Alberto Chou is a 80year old female who presents for preoperative consultation requested by: Renetta Mcneil MD    Date of Surgery: 2/7/2018 - 2/8/2018    Procedure(s):  ESOPHAGOGASTRODUODENOSCOPY (EGD)  Indication: no 10/06/2016      Chondrocalcinosis of left wrist         Date Noted: 12/09/2015      Ulnar impaction syndrome, left         Date Noted: 12/09/2015      Congenital spondylolisthesis         Date Noted: 06/20/2013      Degeneration of lumbar or lumbosacral in time   Potassium Chloride ER 10 MEQ Oral Tab CR Take 3 tablets (30 mEq total) by mouth 2 (two) times daily.  Disp: 120 tablet Rfl: 1 2/7/2018 at 0900   Levothyroxine Sodium 88 MCG Oral Tab Take 88 mcg by mouth before breakfast. Disp:  Rfl:  2/7/2018 at 0800 chloride 0.9 % 250 mL IVPB 40 mEq Intravenous Once Oswald Daniel MD Last Rate: 62.5 mL/hr at 02/08/18 0958 40 mEq at 02/08/18 0958   potassium chloride 20 mEq in sodium chloride 0.9% 100 mL IVPB 20 mEq Intravenous Once Oswald Daniel MD     TraMADol HCl ( mg Oral QPM Oswald Daniel MD 20 mg at 02/07/18 2086      No current [facility]-ordered outpatient prescriptions on file.       Crestor [Rosuvastat*      Digitek [Digoxin]         Iodine I 131 Tositu*      Latex                     Lisinopril                Peanu arm Left arm    Pulse: 94 97 104 103   Resp: 18 18 17 20   Temp: 98 °F (36.7 °C) 98.1 °F (36.7 °C) 98.4 °F (36.9 °C) 98.4 °F (36.9 °C)   TempSrc: Oral Oral Oral Oral   SpO2: 100% 100% 100% 98%   Weight:       Height:            Anesthesia ROS/Med Hx and Ph

## 2018-02-08 NOTE — RESPIRATORY THERAPY NOTE
CAMRYN ASSESSMENT:    Pt does not have a previous diagnosis of CAMRYN. Pt does not routinely use a CPAP device at home. This pt is not suspected to be at high risk for CAMRYN and sleep lab packet was not provided to patient for outpatient follow-up.

## 2018-02-09 NOTE — PROGRESS NOTES
[facility] - [facility]    Progress Note    Marin Armenta Patient Status:  Inpatient    3/1/1925 MRN [id number]   Location [facility] 3W/SW Attending Arnulfo Frias MD   Hosp Day # 2 PCP Clayton Andujar MD       Interval History:   Feels good, n 11.9*   PLT  305   --   227   --   230    < > = values in this interval not displayed.      Recent Labs   Lab  02/07/18   1336  02/08/18   0541  02/09/18   0528   GLU  118*  106*  132*   BUN  31*  24*  23*   CREATSERUM  1.33  1.08  1.23   GFRAA  40*  51*  4

## 2018-02-09 NOTE — ANESTHESIA PREPROCEDURE EVALUATION
Anesthesia PreOp Note    HPI:     Maira Pak is a 80year old female who presents for preoperative consultation requested by: Akhil Quevedo MD    Date of Surgery: 2/7/2018 - 2/9/2018    Procedure(s):  COLONOSCOPY  Indication: GI bleed      History R of left wrist         Date Noted: 12/09/2015      Ulnar impaction syndrome, left         Date Noted: 12/09/2015      Congenital spondylolisthesis         Date Noted: 06/20/2013      Degeneration of lumbar or lumbosacral intervertebral disc         Date Not MEQ Oral Tab CR Take 3 tablets (30 mEq total) by mouth 2 (two) times daily.  Disp: 120 tablet Rfl: 1 2/7/2018 at 0900   Levothyroxine Sodium 88 MCG Oral Tab Take 88 mcg by mouth before breakfast. Disp:  Rfl:  2/7/2018 at 0800   apixaban 2.5 MG Oral Tab Take Intravenous PRN Nelly Stauffer MD 10 mL at 02/08/18 1720   potassium chloride 20 mEq in sodium chloride 0.9% 100 mL IVPB 20 mEq Intravenous Once Nelly Stauffer MD    TraMADol HCl (ULTRAM) tab 50 mg 50 mg Oral Q12H PRN Nelly Stauffer MD 50 mg at 02/09/18 00 2207     No current [facility]-ordered outpatient prescriptions on file.       Crestor [Rosuvastat*      Digitek [Digoxin]         Iodine I 131 Tositu*      Latex                     Lisinopril                Peanuts                   Penicillins               Po 98.8 °F (37.1 °C)   TempSrc: Oral   Oral   SpO2:  90%  92%   Weight:   72.1 kg (159 lb)    Height:            Anesthesia ROS/Med Hx and Physical Exam     Patient summary reviewed and Nursing notes reviewed    Airway   Mallampati: III  TM distance: >3 FB

## 2018-02-09 NOTE — OPERATIVE REPORT
COLONOSCOPY REPORT    Patient Name:  Beryle Stains Record #: [id number]  YOB: 1925  Date of Procedure: 2/9/2018    Referring physician: Harsha Sprague MD    Surgeon:  Willow Beth.  Thelma Fajardo MD    Pre-op diagnosis: Worsening anemia with Hem liquid covering up to 25% of mucosa, but >90% of mucosa seen)  1 Excellent (>95% of mucosa seen)

## 2018-02-09 NOTE — PLAN OF CARE
CARDIOVASCULAR - ADULT    • Maintains optimal cardiac output and hemodynamic stability Progressing    • Absence of cardiac arrhythmias or at baseline Progressing        Patient wearing telemetry monitor, in atrial fibrillation.  No complaints of chest pain

## 2018-02-09 NOTE — INTERVAL H&P NOTE
Pre-op Diagnosis: GI bleed    The above referenced H&P was reviewed by Palmer Hassan MD on 2/9/2018, the patient was examined and no significant changes have occurred in the patient's condition since the H&P was performed.   I discussed with the patient

## 2018-02-09 NOTE — ANESTHESIA POSTPROCEDURE EVALUATION
Patient: Nghia Dewitt    Procedure Summary     Date:  02/09/18 Room / Location:  [address] [place] [address] ENDOSCOPY 05 / [address] [place] [address] ENDOSCOPY    Anesthesia Start:  0987 Anesthesia Stop:      Procedure:  COLONOSCOPY (N/A ) Diagnosis:  (Diverticulosis)    Surgeon:  Rey Post

## 2018-02-09 NOTE — PROGRESS NOTES
[facility] AND [facility]  MHS/AMG Cardiology Progress Note    Salas Croatian Patient Status:  Inpatient    3/1/1925 MRN [id number]   Location [facility] 3W/SW Attending Armand Webber MD   Hosp Day # 2 PCP Yanely Rose MD     80year old female, consult 2015: CARDIAC PACEMAKER PLACEMENT  01/2018: EGD      Comment: bleeding duodenal avm  No date: HYSTERECTOMY  No date: REMOVAL GALLBLADDER  No date: TOTAL HIP REPLACEMENT      Comment: Bilateral  No date: WRIST FRACTURE SURGERY Right  History reviewed.  No pe

## 2018-02-10 NOTE — PROGRESS NOTES
Pt desat on room air to 87% SpO2, pt removing nasal cannula, confused. Up to 93% on 3 L NC when instructed to breathe through nose.

## 2018-02-10 NOTE — PHYSICAL THERAPY NOTE
PHYSICAL THERAPY EVALUATION - INPATIENT     Room Number: 305/305-A  Evaluation Date: 2/10/2018  Type of Evaluation: PT Evaluation  Physician Order: PT Eval and Treat    Presenting Problem: Acute GI bleed  Reason for Therapy: Mobility Dysfunction and Lupe Nice Hypothyroidism    • Macular degeneration    • Osteoporosis    • Peripheral neuropathy    • Restless leg syndrome    • Rheumatoid arthritis(714.0)    • Sleep apnea    • Thyroid disease    • Unspecified essential hypertension    • Wrist fracture     RT Moving to and from a bed to a chair (including a wheelchair)?: A Little   -   Need to walk in hospital room?: A Lot   -   Climbing 3-5 steps with a railing?: Total     AM-PAC Score:  Raw Score: 15   PT Approx Degree of Impairment Score: 57.7%   Standardi

## 2018-02-10 NOTE — PROGRESS NOTES
[facility] - [facility]    Cardiology Progress Note    Diana Zuniga Patient Status:  Inpatient    3/1/1925 MRN [id number]   Location [facility] 3W/SW Attending Ami Crowell MD   Hosp Day # 3 PCP Leo Lane MD         Assessment and Plan: Oral Daily   • DilTIAZem HCl ER Coated Beads  240 mg Oral Daily   • docusate sodium  100 mg Oral BID   • gabapentin  200 mg Oral Nightly   • Levothyroxine Sodium  88 mcg Oral Before breakfast   • magnesium oxide  400 mg Oral BID   • Pantoprazole Sodium  40

## 2018-02-10 NOTE — SLP NOTE
ADULT SWALLOWING EVALUATION    ASSESSMENT    ASSESSMENT/OVERALL IMPRESSION:  Pt seen sitting upright in chair for BSSE. Pt with no reported hx of dysphagia prior to this admission. Orders rec'd following pt reportedly \"fail\" with RN swallow evaluation.  P • Diastolic CHF, chronic (HCC)    • Diverticulitis    • Glaucoma    • HLD (hyperlipidemia)    • Hypothyroidism    • Macular degeneration    • Osteoporosis    • Peripheral neuropathy    • Restless leg syndrome    • Rheumatoid arthritis(714.0)    • Sleep a patient/family/caregiver will demonstrate understanding and implementation of aspiration precautions and swallow strategies independently over 2 session(s).     In Progress   Goal #3 The patient will utilize compensatory strategies as outlined by  BSSE (cli

## 2018-02-10 NOTE — PROGRESS NOTES
[facility]  Gastroenterology Progress Note    Leif Taylor Patient Status:  Inpatient    3/1/1925 MRN [id number]   Location [facility] 3W/SW Attending Franco Toscano MD   Hosp Day # 3 PCP Mike Pollock MD     Subjective:  Leif Taylor is malformation) of duodenum, acquired with hemorrhage     Iron deficiency anemia     Gastrointestinal hemorrhage     Hyponatremia     Anemia     Acute kidney injury ([facility] [address]     Azotemia     Gastrointestinal hemorrhage, unspecified gastrointestinal hemorrhage t

## 2018-02-10 NOTE — PLAN OF CARE
Problem: Patient/Family Goals  Goal: Patient/Family Long Term Goal  Patient's Long Term Goal: not come back here    Interventions:  - See additional Care Plan goals for specific interventions    Outcome: Progressing  VSS. No further bleeding.  Hemoglobin st

## 2018-02-10 NOTE — PROGRESS NOTES
[facility]    Progress Note    Delmi Keane Patient Status:  Inpatient    3/1/1925 MRN [id number]   Location [facility] 3W/SW Attending Nelly Stauffer MD   Hosp Day # 3 PCP Tatyana Madera MD       Subjective:   Delmi Keane is a( ALT 13 (L) 02/07/2018   MG 2.0 02/10/2018   TROP 0.03 02/07/2018   B12 274 01/23/2018                   Prince Mayo MD  2/10/2018

## 2018-02-11 NOTE — PROGRESS NOTES
Pt c/o \"pain\" in left ankle, hx of arthritis, no swelling or evidence of bruising. Applied hot pack, elevated ankle, administered tylenol, see MAR. Pt resting in bed, call light in reach.

## 2018-02-11 NOTE — CM/SW NOTE
MD order received regarding rehab. MARK contacted the pt's son Yonatan Ospina [phone] in regards to rehab. Yonatan Ospina stated the pt. Has been to [facility] in the past and he is agreeable to a referral there again.   Referral sent and DON screen has been requ

## 2018-02-11 NOTE — PROGRESS NOTES
[facility] AND [facility]  Gastroenterology Progress Note    Marquis Givens Patient Status:  Inpatient    3/1/1925 MRN [id number]   Location [facility] 3W/SW Attending Wendolyn Sever, MD   Hosp Day # 4 PCP Lorena Hall MD     Subjective:  Marquis Givens is diastolic CHF (congestive heart failure) (HCC)     Atrial fibrillation, persistent (HCC)     HTN (hypertension)     Accidental fall     Accidental fall, initial encounter     Acute pain of both knees     Peripheral edema     Inability to ambulate due to kn

## 2018-02-11 NOTE — PROGRESS NOTES
[facility]    Progress Note    Vikki Keenan Patient Status:  Inpatient    3/1/1925 MRN [id number]   Location [facility] 3W/SW Attending Sara Collier MD   Hosp Day # 4 PCP Barak Tran MD       Subjective:   Vikki Keenan is a( 02/11/2018    (H) 02/11/2018   CA 8.2 (L) 02/11/2018   ALB 2.6 (L) 02/11/2018   ALKPHO 67 02/11/2018   BILT 0.7 02/11/2018   TP 5.4 (L) 02/11/2018   AST 24 02/11/2018   ALT 13 (L) 02/11/2018   MG 2.0 02/10/2018   TROP 0.03 02/07/2018   B12 274 01/23

## 2018-02-11 NOTE — PLAN OF CARE
Problem: Patient/Family Goals  Goal: Patient/Family Long Term Goal  Patient's Long Term Goal: not come back here    Interventions:  - See additional Care Plan goals for specific interventions    Outcome: Progressing  VSS. Hemoglobin stable.  Plan for discha

## 2018-02-12 PROBLEM — K92.2 GASTROINTESTINAL HEMORRHAGE, UNSPECIFIED GASTROINTESTINAL HEMORRHAGE TYPE: Status: RESOLVED | Noted: 2018-01-01 | Resolved: 2018-01-01

## 2018-02-12 PROBLEM — E87.1 HYPONATREMIA: Status: RESOLVED | Noted: 2018-01-01 | Resolved: 2018-01-01

## 2018-02-12 PROBLEM — K92.2 GASTROINTESTINAL HEMORRHAGE: Status: RESOLVED | Noted: 2018-01-01 | Resolved: 2018-01-01

## 2018-02-12 NOTE — TRANSITION NOTE
HPI    CAD s/p PCI in 2014 with BMS (2.25 diagonal, 3.5 Cx)  · AF on eliquis, was stopped due to GIB, AVM cauterized and so restarted prior to discharge (jan 2018)  SSS s/p DC PPM  · Normal EF on echo Jan 2018     hospital course    GIB-                GI

## 2018-02-12 NOTE — CM/SW NOTE
2/12/18 CM Discharge planning / MDO transfer to rehab   Spoke with Jacqueline Pandey at [facility], bed available today at 4:00 pm, RN report [phone], transport arranged via [facility] pt is on O23l.    Spoke with christopher Baez via phone [phone], connie

## 2018-02-12 NOTE — PROGRESS NOTES
[facility]    Cardiology Progress Note    Erica Age Patient Status:  Inpatient    3/1/1925 MRN [id number]   Location [facility] 3W/SW Attending Dewayne Ruiz MD   Hosp Day # 4 PCP Jamil Rodgers MD         Assessment and Plan: Date   WBC 7.9 02/11/2018   HGB 9.4 (L) 02/11/2018   HCT 28.9 (L) 02/11/2018    02/11/2018   CREATSERUM 1.10 02/11/2018   BUN 14 02/11/2018    02/11/2018   K 2.6 (L) 02/11/2018   CL 93 (L) 02/11/2018   CO2 36 (H) 02/11/2018    (H) 02/11

## 2018-02-12 NOTE — SLP NOTE
SPEECH DAILY NOTE - INPATIENT    ASSESSMENT & PLAN   ASSESSMENT  Pt seen sitting upright in chair for diet chk. Pt was alert and presented with good oral acceptance.  Pt reported no concerns with swallowing and stated that she is hoping to leave the [facility] aspiration precautions and swallow strategies independently over 2 session(s). Swallow Guide filled out by SLP during session and dicussed with pt.  SLP and pt reviewed strategies including no straws, alternating bites and sips, upright position, and chi

## 2018-02-13 NOTE — DISCHARGE SUMMARY
[facility] - [facility]    Discharge Summary    Grand Night Patient Status:  Inpatient    3/1/1925 MRN [id number]   Location [facility] 3W/SW Attending No att. providers found   [facility] [place] Day # 5 PCP Brooke Pena MD     Date of Admission: 20 Location Status    [id number] 2/8/18 ESOPHAGOGASTRODUODENOSCOPY (EGD) Akhil Quevedo MD [address] [place] [address] ENDOSCOPY Comp    [id number] 2/9/18 COLONOSCOPY Akhil Quevedo MD [address] [place] [address] ENDOSCOPY Comp            Discharge Plan:   Discharge Condition: Stable    Discharge Medication Historical    Cholecalciferol (VITAMIN D) 1000 UNITS Oral Cap  Take 1,000 Units by mouth daily.   , Historical    rOPINIRole HCl (REQUIP) 1 MG Oral Tab  Take 1 mg by mouth nightly.  , Historical    gabapentin (NEURONTIN) 100 MG Oral Cap  TAKE 2 CAPSULES BY

## 2018-02-14 NOTE — PROGRESS NOTES
Jorge Alberto Chou  : 3/1/1925  Age 80year old  female patient is admitted to [place] [address] for strengthening and rehab on 2018    Admitted to [address] [place] [address] 2018-2018    Chief complaint: GI hemorrhage, persistent A-FIB, anemia, dependence on supplementa Comment: Procedure: COLONOSCOPY;  Surgeon: Bhumika Davies MD;  Location: [facility] ENDOSCOPY  01/2018: EGD      Comment: bleeding duodenal avm  No date: HYSTERECTOMY  No date: REMOVAL GALLBLADDER  No date: TOTAL HIP REPLACEMENT      Comment: Bi tablets (30 mEq total) by mouth 2 (two) times daily. Disp: 120 tablet Rfl: 1   Levothyroxine Sodium 88 MCG Oral Tab Take 88 mcg by mouth before breakfast. Disp:  Rfl:    DiltiaZEM HCl ER Coated Beads 240 MG Oral Capsule SR 24 Hr Take 240 mg by mouth daily. food or seasonal allergies      PHYSICAL EXAM:  GENERAL HEALTH: well developed, well nourished, in no apparent distress  LINES, TUBES, DRAINS:  none  SKIN: warm  WOUND: NONE  EYES: PERRLA, EOMI, sclera anicteric, conjunctiva normal; there is no nystagmus, discharge 2/12  -Monitor labs weekly and PRN     4. Restless legs syndrome  -Continue ropinirole  -Can continue with minerin topical cream to dry legs  5.  Generalized Weakness  -PT/OT recommendation   -Resided at memory care unit prior  -Fall precautions

## 2018-02-20 ENCOUNTER — PRIOR ORIGINAL RECORDS (OUTPATIENT)
Dept: OTHER | Age: 83
End: 2018-02-20

## 2018-02-20 NOTE — PROGRESS NOTES
Christian Herrera, 11/1925, 80year old, female at [place] [address] for strengthening and rehab on 2/12/2018     Admitted to [address] [place] [address] 2/7/2018-2/12/2018     Chief complaint: GI hemorrhage, persistent A-FIB, anemia, dependence on supplemental oxygen      HPI  89 year Chloride 96  CO2 32 eGFR 51    PHYSICAL EXAM:  GENERAL HEALTH: well developed, well nourished, in no apparent distress  LINES, TUBES, DRAINS:  none  SKIN: warm  WOUND: NONE  EYES: PERRLA, EOMI, sclera anicteric, conjunctiva normal; there is no nystagmus, n RN to obtain daily weights   -MALU diet in rehab      3. Acute kidney injury  -BUN 16/CR 1.11 on discharge 2/12-  -Monitor labs weekly and PRN       4. Restless legs syndrome  -Continue ropinirole  -Can continue with minerin topical cream to dry legs  5.  Ge

## 2018-02-23 NOTE — PROGRESS NOTES
Elle Tao, 11/1925, 80year old, female is being discharged from [place] [address] on Friday 2/23/2018 back to [facility] in [address] on [address]     Date of Admission to [facility] 2/12/2018    Date of Discharge is no nystagmus, no drainage from eyes  HENT: normocephalic; normal nose, no nasal drainage, mucous membranes pink, moist, pharynx no exudate, no visible cerumen  NECK: supple; FROM; no JVD, no TMG, no carotid bruits  BREAST: deferred   RESPIRATORY:[facility] discharge 2/12-  -Monitor labs weekly and PRN : 2/22= 0.91/12     4. Restless legs syndrome  -Continue ropinirole  -Can continue with minerin topical cream to dry legs  5.  Generalized Weakness-improved  -PT/OT [facility]idential home health RN/PT/OT to follow at

## 2018-03-02 ENCOUNTER — PRIOR ORIGINAL RECORDS (OUTPATIENT)
Dept: OTHER | Age: 83
End: 2018-03-02

## 2018-03-25 NOTE — ED NOTES
Report given to [facility]

## 2018-03-25 NOTE — ED NOTES
ER MD stated sending pt back to [facility]- this RN attempted to call son, Anu Brizuela, emergency contact. No answer.

## 2018-03-25 NOTE — TELEPHONE ENCOUNTER
Spoke to Dr. Sunshine Valles in [facility] ER. Patient was down to 88%. With [employer] treatment she went up to 94% and is doing good.   They tried to give her oxygen in the nursing home per nurse's note and she would not take it she is chronically on oxygen she does

## 2018-03-25 NOTE — ED PROVIDER NOTES
Patient Seen in: [facility] AND [facility] Emergency Department    History   Patient presents with:  Dyspnea ELIZABETH SOB (respiratory)    Stated Complaint: 88% RA- uses 2 L NC PRN for COPD- refusing at [facility].  Given 650 mg Tylenol for 99.2*    HPI    Patient is a 93-yea 99.2*  Other systems are as noted in HPI. Constitutional and vital signs reviewed. All other systems reviewed and negative except as noted above.     Physical Exam   ED Triage Vitals  BP: 116/100 [03/25/18 1144]  Pulse: 78 [03/25/18 1211]  Resp: 20 [0 Non- 20 (*)     GFR, -American 23 (*)     All other components within normal limits   HEPATIC FUNCTION PANEL (7) - Abnormal; Notable for the following:     ALT 9 (*)     Albumin 3.4 (*)     All other components within normal limits return to nursing home.   Attempted to call family and were unable to      Disposition and Plan     Clinical Impression:  Congestive heart failure, unspecified HF chronicity, unspecified heart failure type ([facility] [address]  (primary encounter diagnosis)    Disposition

## 2018-03-26 NOTE — TELEPHONE ENCOUNTER
Called at 1019 in the morning by [facility] at [phone] regarding patient. Patient with low-grade fever cough shortness of breath saturating at around 8780% after 3 nebs treatments. Patient is DNR T should mention that later.   Patient has had numerous inf

## 2018-04-01 NOTE — ED PROVIDER NOTES
Patient Seen in: [facility] AND [facility] Emergency Department    History   Patient presents with:  Chest Pain Angina (cardiovascular)      HPI    Patient presents to the ED complaining of chest pain that started an hour ago at her nursing care facility.   Anthony Alcohol use: Yes                Comment: 1 small glass of wine 2-3 times per                 month. Drug use: No            Other Topics            Concern  Caffeine Concern        Yes    Comment:2 cups tea per day.         ROS  Pertinent DIFFERENTIAL WITH PLATELET    Narrative: The following orders were created for panel order CBC WITH DIFFERENTIAL WITH PLATELET.   Procedure                               Abnormality         Status                     --------- Congenital spondylolisthesis; Degeneration of lumbar or lumbosacral intervertebral disc; Myalgia and myositis, unspecified; Lumbago; Carpal tunnel syndrome; Body mass index 30.0-30.9, adult; Other osteoporosis; Sciatica;  Chondrocalcinosis of left wrist; Ul PM

## 2018-04-22 PROBLEM — I74.5 ILIAC ARTERY THROMBOSIS (HCC): Status: ACTIVE | Noted: 2018-01-01

## 2018-04-22 PROBLEM — I73.9 ARTERIAL INSUFFICIENCY OF LOWER EXTREMITY (HCC): Status: ACTIVE | Noted: 2018-01-01

## 2018-04-22 NOTE — CONSULTS
[facility]    Report of Consultation    Gilberto Reyes Patient Status:  Inpatient    3/1/1925 MRN [id number]   Location [facility] 2W/SW Attending Agus Donnelly MD   Hosp Day # 0 PCP Yeyo Huerta MD     Date of Admission:   Concern  Caffeine Concern        Yes    Comment:2 cups tea per day.     Social History Narrative    None on file            Current Medications:    Current Facility-Administered Medications:  lactated ringers infusion  Intravenous Continuous   ondansetron H 0.4 mg Sublingual Q5 Min PRN   Dexmedetomidine HCl (PRECEDEX) 400 mcg in sodium chloride 0.9 % 100 mL infusion 0.2-1.5 mcg/kg/hr Intravenous Continuous   propofol (DIPRIVAN) infusion 5-100 mcg/kg/min Intravenous Continuous   heparin infusion in D5W IV fidel Oral Tab Take 1 mg by mouth nightly.     gabapentin (NEURONTIN) 100 MG Oral Cap TAKE 2 CAPSULES BY MOUTH AT BEDTIME       Allergies    Crestor [Rosuvastat*      Digitek [Digoxin]         Iodine I 131 Tositu*      Latex                     Lisinopril Approved by (CST): Stacia Kim MD on 4/22/2018 at 7:42          Cta Abd Pel Andrei Leg Runoff Diaph To Toes Iv Contrast(cpt=75635)    Result Date: 4/22/2018  CONCLUSION:   RIGHT LEG: -Occluded iliac arteries.  -Occluded superficial femoral artery origi as tolerated  -DVT prophylaxis: Heparin  -Reviewed vitals, labs and imaging    Livan Cristina DO  Pulmonary [facility]  4/22/2018  11:02 AM

## 2018-04-22 NOTE — RESPIRATORY THERAPY NOTE
Patient placed on CPAP 5/PS 5,40%. -360 ML,RR 16-20. NIF -37,VT 360ML,RR 19 and RSBI 57.   Patient extubated at 1040 am.

## 2018-04-22 NOTE — ED INITIAL ASSESSMENT (HPI)
Care assumed from ems with c/o bl lower leg pain, primarily to the left ankle. Pt states that pain started today. ble are cold to touch, skin color is dusky. pts work of breathing appears easy and abnormal at this time.  Pt also presents with distended abd,

## 2018-04-22 NOTE — ANESTHESIA POSTPROCEDURE EVALUATION
Patient: Marin Armenta    Procedure Summary     Date:  04/22/18 Room / Location:  [address] [place] [address] MAIN OR 02 / [address] [place] [address] MAIN OR    Anesthesia Start:  0214 Anesthesia Stop:      Procedure:  THROMBECTOMY (Right ) Diagnosis:       Arterial insufficiency of lower extremity (HC

## 2018-04-22 NOTE — PROGRESS NOTES
[facility] AND [facility]  Vascular Surgery Consultation    Marin Armenta Patient Status:  Emergency    3/1/1925 MRN [id number]   Location [address] Attending Rica Norman MD   Hosp Day # 0 PCP Clayton Andujar MD     Reason for Con smoking use included Cigarettes. She has never used smokeless tobacco. She reports that she drinks alcohol. She reports that she does not use drugs.     Allergies:    Crestor [Rosuvastat*      Digitek [Digoxin]         Iodine I 131 Tositu*      Latex rashes, no suspicious lesions, warm and dry  EXTREMITIES: no edema, full range of motion, no tenderness  NEURO/PSYCH: orientated x3, normal mood and affect, no sensory or motor deficit    ARTERIAL VASCULAR EXAM    LOWER EXTREMITY     FEMORAL POPLITEAL POST

## 2018-04-22 NOTE — PROGRESS NOTES
Patient extubated. When patient was extubated she was saying that she was having left leg pain. I operated on her right leg yesterday. He came in to evaluate and she is not complaining of any pain currently.   I think her dementia is a major factor that

## 2018-04-22 NOTE — PROGRESS NOTES
Pt received from OR at this time s/p Rt iliofemoral forgarty embolectomy, Rt common femoral endarterectomy & patch angioplasty, and Four compartment fasciotomy via Dr Kassandra Mckinney. Pt intubated w/ no sedation currently infusing, will obtain order.  Pt restless, a

## 2018-04-22 NOTE — ANESTHESIA PREPROCEDURE EVALUATION
Anesthesia PreOp Note    HPI:     Jorge Alberto Chou is a 80year old female who presents for preoperative consultation requested by: Allyson Quinonez MD    Date of Surgery: 4/21/2018 - 4/22/2018    Procedure(s):  THROMBECTOMY  Indication: Arterial insufficienc Noted: 12/09/2015      Congenital spondylolisthesis         Date Noted: 06/20/2013      Degeneration of lumbar or lumbosacral intervertebral disc         Date Noted: 06/20/2013      Myalgia and myositis, unspecified         Date Noted: 06/20/2013      Lumb Take 2.5 mg by mouth once a week. Disp:  Rfl:    TraMADol HCl 50 MG Oral Tab Take 1 tablet (50 mg total) by mouth every 12 (twelve) hours as needed. Disp: 40 tablet Rfl: 0   Skin Protectants, Misc.  [facility]) External Cream Apply 60 g topically 2 (two) time Peanuts                   Penicillins               Pollen                    Pregabalin                Raloxifene                Shellfish-Derived P*        No family history on file. Social History  Social History   Marital status:   Airway   Dental    (+) upper dentures and lower dentures    Pulmonary    (+) COPD mild, sleep apnea, rhonchi, rales,   Cardiovascular   Exercise tolerance: poor  (+) pacemaker, hypertension well controlled, CAD, CHF,     NYHA Classification: II  ECG review

## 2018-04-22 NOTE — ED NOTES
Surgeon explained surgery and risks to pts son. Consent form signed by son. Pt sleeping. Will continue to monitor.

## 2018-04-22 NOTE — ED PROVIDER NOTES
Patient Seen in: [facility] AND [facility] Emergency Department    History   Patient presents with:  Deep Vein Thrombosis (cardiovascular)    Stated Complaint:  LLE pain    HPI    81 yo F with PMH afib off AC secondary to GIB, HTN, HL, pulmonary edema presenting f needed. Potassium Chloride ER 10 MEQ Oral Tab CR,  Take 3 tablets (30 mEq total) by mouth 2 (two) times daily. Patient taking differently: Take 30 mEq by mouth 2 (two) times daily.  20 meq additional once weekly with metolazone    Levothyroxine Sodium 88 Vitals  BP: 160/72 [04/21/18 2244]  Pulse: 72 [04/21/18 2244]  Resp: 19 [04/21/18 2244]  Temp: (!) 97.4 °F (36.3 °C) [04/21/18 2244]  Temp src: Oral [04/21/18 2244]  SpO2: 97 % [04/22/18 0000]  O2 Device: None (Room air) [04/21/18 2244]    Current:/5 created for panel order CBC WITH DIFFERENTIAL WITH PLATELET.   Procedure                               Abnormality         Status                     ---------                               -----------         ------                     CBC W/ DIFFERENTIAL[ intended solely for the use of the individual or entity to which it is addressed.  If you are not the intended recipient of this report, you are hereby notified that any copying, distribution, dissemination or action taken in relation to the contents of thi left SFA via deep femoral collaterals. - Patent left popliteal artery to the level of the tibioperoneal trunk.  - Below the knee, evaluation of luminal patency or stenoses is significantly limited due to severe calcific disease.       In this patient wit complex decision making, and/or extensive discussions with the patient, family, and clinical staff.   Disposition and Plan     Clinical Impression:  Arterial insufficiency of lower extremity (HCC)  (primary encounter diagnosis)  Iliac artery thrombosis ([facility] [address]

## 2018-04-22 NOTE — ANESTHESIA PROCEDURE NOTES
Arterial Line  Performed by: Lior Francois  Authorized by: Lior Francois     Procedure Start:  4/22/2018 2:38 AM  Procedure End:  4/22/2018 2:52 AM  Site Identification: surface landmarks    Patient Location:  OR  Indication: continuous blood pressure monito

## 2018-04-22 NOTE — PLAN OF CARE
CARDIOVASCULAR - ADULT    • Maintains optimal cardiac output and hemodynamic stability Progressing    • Absence of cardiac arrhythmias or at baseline Progressing    Pt with pacemaker, underlying rhythm afib,  Rate controlled will monitor    RESPIRATORY - A

## 2018-04-22 NOTE — PROGRESS NOTES
Patient is currently stating that she has no pain whatsoever on her left leg. She has sensation and movement. She has a palpable femoral pulse Leg is cooler but I suspect this is primarily chronic disease. I reviewed the CT scan and the ultrasound.   On

## 2018-04-22 NOTE — OPERATIVE REPORT
Pre-Operative Diagnosis: thrombosis of right leg     Post-Operative Diagnosis: same     Procedure Performed:   1. Right iliofemoral forgarty embolectomy  2. Right common femoral endarterectomy and patch angioplasty  3.  Four compartment fasciotomy    Vesta Duckworth and subcutaneous fat with a knife down to the femoral sheath. Patient is quite deep due to her obesity and we are able to dissect out a nonpulsatile femoral artery. Superficial femoral artery, profunda and common femoral were encircled with Vesseloops. profunda. We then packed with Gelfoam and thrombin internal retention lower leg. Due to the length of time that she was likely ischemic I felt fasciotomies were in order.   On the lateral aspect of the leg elevated a large incision going through skin and

## 2018-04-22 NOTE — H&P
[facility] Patient Status:  Inpatient    3/1/1925 MRN [id number]   Location [facility] 2W/SW Attending Devi Howell MD   Hosp Day # 0 PCP Jamil Rodgers MD     Date:  2018  Date of Ad 0.00      Years: 0.00         Types: Cigarettes     Quit date: 2/20/1970  Smokeless tobacco: Never Used                      Alcohol use: Yes              Comment: 1 small glass of wine 2-3 times per month. Allergies/Medications:    Allergies:   Crestor tablet by mouth daily. Pravastatin Sodium (PRAVACHOL) 20 MG Oral Tab Take 20 mg by mouth nightly. Cholecalciferol (VITAMIN D) 1000 UNITS Oral Cap Take 1,000 Units by mouth daily. rOPINIRole HCl (REQUIP) 1 MG Oral Tab Take 1 mg by mouth nightly. 03/25/2018   ALKPHO 86 03/25/2018   BILT 0.7 03/25/2018   TP 7.4 03/25/2018   AST 24 03/25/2018   ALT 9 (L) 03/25/2018   .2 (H) 04/22/2018   INR 1.2 04/22/2018   TSH 10.64 (H) 04/16/2018   MG 2.0 02/10/2018   TROP 0.02 04/21/2018    (H) 04/21/ Report  Interpretation  --------------------------       Assessment/Plan:      Lower Ext Thrombosis   s/p Rt Iliofemoral embolectomy, Rt common femoral endarectomy and patch, four compartment fasciotomy on 4/22/2018  CPM per vascular   ICU care   Extubated

## 2018-04-23 NOTE — PROGRESS NOTES
[facility]    Progress Note    Yemi Arellano Patient Status:  Inpatient    3/1/1925 MRN [id number]   Location [facility] 2W/SW Attending Clay Schwartz MD   Hosp Day # 1 PCP Abbi Abdi MD       Assessment and Plan:     1.  Leg Nightly   • docusate sodium  100 mg Oral BID   • Chlorhexidine Gluconate  15 mL Mouth/Throat [email or web address]   • aspirin  81 mg Oral Daily       Results:     Recent Labs   Lab  04/21/18   2315  04/22/18   0630  04/23/18   0735   RBC  4.82  4.53  3.55*   HGB severe atherosclerosis. No significant opacification of the distal runoff vessels. LEFT LEG: -Heavy atherosclerosis without occlusion of the iliac arteries. -Occluded common femoral and superficial femoral arteries.  The distal SFA is collateralized via de

## 2018-04-23 NOTE — PROGRESS NOTES
[facility]    Progress Note    Oliver Vera Patient Status:  Inpatient    3/1/1925 MRN [id number]   Location [facility] 2W/SW Attending Latricia Isidro MD   Hosp Day # 1 PCP Bharath Amin MD       Interval History:   Awake, conf 115*  133*   BUN  46*  48*   CREATSERUM  1.39  1.56*   GFRAA  38*  33*   GFRNAA  33*  28*   CA  9.4  8.2*   NA  134*  132*   K  4.6  4.1   CL  90*  94*   CO2  31  28   MG   --   2.1     Recent Labs   Lab  04/22/18   0630  04/22/18   1642  04/23/18   0027 artery without occlusion. -Limited evaluation of runoff vessels do to severe atherosclerosis. The posterior tibial artery appears to remain patent. OTHER VESSELS: -See above for details. 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   OTHER: -Post chol

## 2018-04-23 NOTE — PROGRESS NOTES
[facility]  Vascular Surgery Progress Note    Diana Zuniga Patient Status:  Inpatient    3/1/1925 MRN [id number]   Location [facility] 2W/SW Attending Иван Gimenez MD   Hosp Day # 1 PCP Leo Lane MD     Objective:   Temp: 97.5 °F who actually is on his way now to examine the patient and decide the next step. I did stop the heparin drip in order to allow the oozing to stop. No intervention is needed for the left side at this point.     Medications:    Current Facility-Administered 04/23/18   0735   WBC  13.7*  13.5*  16.2*   HGB  13.5  12.7  10.0*   MCV  87.1  87.4  87.6   PLT  193  188  204   INR   --   1.2   --        Recent Labs   Lab  04/21/18   2315  04/23/18   0735   NA  134*  132*   K  4.6  4.1   CL  90*  94*   CO2  31  28

## 2018-04-23 NOTE — PLAN OF CARE
Problem: RESPIRATORY - ADULT  Goal: Achieves optimal ventilation and oxygenation  INTERVENTIONS:  - Assess for changes in respiratory status  - Assess for changes in mentation and behavior  - Position to facilitate oxygenation and minimize respiratory effo Monitor for areas of redness and/or skin breakdown  - Initiate interventions, skin care algorithm/standards of care as needed   Outcome: Progressing    Goal: Incision(s), wounds(s) or drain site(s) healing without S/S of infection  INTERVENTIONS:  - Assess vac in place. IVF infusing. Denied any pain. Repositioning q2hrs.

## 2018-04-23 NOTE — CM/SW NOTE
4/23CM-MD orders received in regards to a POLST. The Patient has a POLST uploaded. This Writer to send the DNR to registration to have the POLST uploaded into it specific location.    -[facility] [id number]

## 2018-04-23 NOTE — PROGRESS NOTES
[facility]    Progress Note    Emmy North Patient Status:  Inpatient    3/1/1925 MRN [id number]   Location [facility] 2W/SW Attending Tina Gilbert MD   Hosp Day # 1 PCP Dolph Hammans, MD     Subjective:     Constitutional:  (H) 04/23/2018   CA 8.2 (L) 04/23/2018   ALB 3.3 (L) 04/23/2018   ALKPHO 46 04/23/2018   BILT 0.6 04/23/2018   TP 6.4 04/23/2018   AST 41 04/23/2018   ALT 17 04/23/2018   PTT 73.6 (H) 04/23/2018   INR 1.2 04/22/2018   TSH 4.51 04/22/2018   MG 2.1 OTHER VESSELS: -See above for details. 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 OTHER: -Post cholecystectomy with biliary ductal dilatation. Correlate with LFTs.   The preliminary report was provided by [facility] and there is no significant

## 2018-04-24 PROBLEM — Z71.89 GOALS OF CARE, COUNSELING/DISCUSSION: Status: ACTIVE | Noted: 2018-01-01

## 2018-04-24 PROBLEM — Z71.89 ADVANCE CARE PLANNING: Status: ACTIVE | Noted: 2018-01-01

## 2018-04-24 NOTE — CM/SW NOTE
SW received MDO for community palliative care and that son is requesting rehab at [place].     WEI contacted son/David who stated that he would like for pt to go to [facility] for rehab, as pt has been there in the past. Wei placed referral to [facility] [employer]

## 2018-04-24 NOTE — PHYSICAL THERAPY NOTE
Chart reviewed  PT order from primary care team -------    RN approve participation  Pending  Call to [facility] sx ---Therapy contacting   [facility] sx MD office for activity clearance as pt s/p   2 [facility] sx this admission with most recent sx last night .       Also n

## 2018-04-24 NOTE — PROGRESS NOTES
I reviewed ct scan remotely  Left iliac and common femoral appear patent, her profunda appears diseased with the main profunda branch occluded - likely chronic  SFA appears to be chronically occluded with collaterals  Patient not complaining of left leg pa

## 2018-04-24 NOTE — ANESTHESIA PREPROCEDURE EVALUATION
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 Disp:  Rfl:     torsemide 20 MG Oral Tab Take 60 mg by mouth daily. Disp:  Rfl:     torsemide 20 MG Oral Tab Take 20 mg by mouth every evening.  Disp:  Rfl:     Diclofenac (NEURONTIN) 100 MG Oral Cap TAKE 2 CAPSULES BY MOUTH AT BEDTIME Disp: 60 capsule Rfl: 0 Taking       Current Facility-Administered Medications Ordered in Epic:  sodium chloride 0.9 % infusion         Sodium Chloride 0.9 % solution         DilTIAZem HCl (CA Intravenous Q2H PRN Adrián Pfeiffer MD 2 mg at 04/23/18 1850    Or         morphINE sulfate (PF) 4 MG/ML injection 8 mg 8 mg Intravenous Q2H PRN Adrián Pfeiffer MD 8 mg at 04/23/18 2361    HYDROmorphone HCl (DILAUDID) 1 MG/ML injection 0.4 mg 0.4 mg Intr 28.3 04/24/2018   MCHC 32.0 04/24/2018   RDW 17.8 (H) 04/24/2018    04/24/2018   MPV 7.5 04/24/2018       Lab Results  Component Value Date    (L) 04/24/2018   K 4.2 04/24/2018    04/24/2018   CO2 23 04/24/2018   BUN 48 (H) 04/24/2018 management. All of the patient's questions were answered to the best of my ability. The patient desires the anesthetic management as planned.   HAWA CHAND  4/24/2018 8:55 AM

## 2018-04-24 NOTE — ANESTHESIA POSTPROCEDURE EVALUATION
Patient: Vikki Mole    Procedure Summary     Date:  04/23/18 Room / Location:  [facility] OR 04 / [facility] OR    Anesthesia Start:  2047 Anesthesia Stop:      Procedure:  FASCIOTOMY (Right ) Diagnosis:       Arterial insufficiency of lower extremity ([facility] [address]

## 2018-04-24 NOTE — ANESTHESIA PREPROCEDURE EVALUATION
Anesthesia PreOp Note    HPI:     Driss Ashford is a 80year old female who presents for preoperative consultation requested by: Lucy Hager MD    Date of Surgery: 4/21/2018 - 4/23/2018    Procedure(s):  FASCIOTOMY  Indication: Arterial insufficiency 12/09/2015      Ulnar impaction syndrome, left         Date Noted: 12/09/2015      Congenital spondylolisthesis         Date Noted: 06/20/2013      Degeneration of lumbar or lumbosacral intervertebral disc         Date Noted: 06/20/2013      Myalgia and my dinner. Disp:  Rfl:     acetaminophen 325 MG Oral Tab Take 650 mg by mouth every morning before breakfast. Disp:  Rfl:     torsemide 20 MG Oral Tab Take 60 mg by mouth daily. Disp:  Rfl:     torsemide 20 MG Oral Tab Take 20 mg by mouth every evening.  Disp: Taking   gabapentin (NEURONTIN) 100 MG Oral Cap TAKE 2 CAPSULES BY MOUTH AT BEDTIME Disp: 60 capsule Rfl: 0 Taking       Current Facility-Administered Medications Ordered in Epic:  DilTIAZem HCl (CARDIZEM) tab 30 mg 30 mg Oral 4 times per day Donald Marshall Intravenous Q2H PRN Mirella Jc MD    Or        HYDROmorphone HCl (DILAUDID) 1 MG/ML injection 0.8 mg 0.8 mg Intravenous Q2H PRN Mirella Jc MD    Or        HYDROmorphone HCl (DILAUDID) 1 MG/ML injection 1.2 mg 1.2 mg Intravenous Q2H PRN Komal 04/23/2018   CA 8.2 (L) 04/23/2018       Lab Results  Component Value Date   INR 1.2 04/22/2018       Vital Signs: Body mass index is 28.31 kg/m². height is 1.575 m (5' 2\") and weight is 70.2 kg (154 lb 12.2 oz).  Her temporal temperature is 97.5 °F (36

## 2018-04-24 NOTE — PLAN OF CARE
Maintains optimal cardiac output and hemodynamic stability Not Progressing    In afib with rate in 110's most of the time. On cardizem drip at 5 mg/hr. Bp occ dips into 90's. Post tibial pulses per doppler bilaterally; rt stronger than lt.

## 2018-04-24 NOTE — CONSULTS
[facility] Patient Status:  Inpatient    3/1/1925 MRN [id number]   Location [facility] 2W/SW Attending Jeramy Esqueda MD   Hosp Day # 2 PCP Brayan Resendiz MD     Date of Consult:  Today is day 2 of hospitalization. This is her 3rd hospitalization in the past 3 months. I reviewed previously completed advance directives on file. HCPOA shows son Nii Bond is her Di Linker as secondary agent.  She has POLST form which shows w conference room. I provided education on the differences between palliative care and hospice care. Palliative care handout provided.  I discussed the benefits of palliative care to include assistance with arising symptom management needs, extra layer of sup care. I updated a new POLST form today with these expressed wishes and original copy given to son. I voided previously completed POLST form. New and voided POLST forms sent to registration for scan into EPIC.       Medical History:  Past Medical History: HYDROcodone-acetaminophen (NORCO) 5-325 MG per tab 1 tablet, 1 tablet, Oral, PRN **OR** HYDROcodone-acetaminophen (NORCO) 5-325 MG per tab 2 tablet, 2 tablet, Oral, PRN  •  fentaNYL citrate (SUBLIMAZE) 0.05 MG/ML injection 25 mcg, 25 mcg, Intravenous, Q5 M (NEURONTIN) cap 100 mg, 100 mg, Oral, Nightly  •  Levothyroxine Sodium (SYNTHROID) tab 50 mcg, 50 mcg, Oral, Before breakfast  •  diltiazem 100mg/100ml in NaCl (CARDIZEM) premix/add-vantage, 2.5-20 mg/hr, Intravenous, Continuous  •  DilTIAZem HCl (CARDIZEM demonstrating patency of the right-sided iliac arteries; mild stenosis of the proximal right external iliac artery is present; occlusion of the right superficial femoral artery over a length measuring nearly 8 cm with distal reconstitution; multifocal athe Correlate with hematologic parameters. 10. Uncomplicated distal colonic diverticulosis. 11. Bilateral hip arthroplasties with resultant artifactual degradation of the pelvis.   12. Moderate to severe tricompartmental osteoarthritic changes of the knees wi with Ace, +doppler pedal pulses per Iram Trejo RN  Neurologic: Alert, confused, oriented to person   Skin: Warm and dry.     Palliative Performance Scale : 20%    Palliative Care Goals of Care:  Discussed with son: Yes  Patient's preference about sharing medica time.   -Son requests NO other family members get medical information-must go through him.  -Son is hopeful pt can go to rehab upon dc and is agreeable to community palliative care following pending clinical course. Not ready for hospice.  SW to help with p

## 2018-04-24 NOTE — PLAN OF CARE
DR. Finesse Harding NOTIFIED OF LEFT CALF PAIN. ORDERS FOR CTA ABD/PELVIS W/ BILATERAL RUNOFF. PT W/ ALLERGY TO IODINE, ORDERS FOR 125MG SOLUMEDROL AND 50 MG BENADRYL OBTAINED DUE TO EMERGENT NEED FOR CT W/ CONTRAST.     PT BROUGHT TO CT ON 2ND FLOOR W/ RN AND TRANS

## 2018-04-24 NOTE — IMAGING NOTE
OK TO INJECT PER DR. Espinosa Begun WITH BAD GFR/CREA AND IODINE ALLERGY. EMERGENCY PREP GIVEN PER LEAD CCU RN.

## 2018-04-24 NOTE — PROGRESS NOTES
Procedure:    Called to assist with IV placement. Patient declines central line agrees to EJ placement. Sterile prep. Patient with poor IV access. R EJ vein located. Chlorhexadine prep, patient in trendelenberg.   Vein canulated with 18 gauge angio

## 2018-04-24 NOTE — PROGRESS NOTES
[facility]    Progress Note    Zaira Garnett Patient Status:  Inpatient    3/1/1925 MRN [id number]   Location [facility] 2W/SW Attending Elana Abel MD   Hosp Day # 2 PCP Halley Ochoa MD       Interval History:   Sleepy.  c/o GLU  115*  133*  132*   BUN  46*  48*  48*   CREATSERUM  1.39  1.56*  1.39   GFRAA  38*  33*  38*   GFRNAA  33*  28*  33*   CA  9.4  8.2*  7.9*   NA  134*  132*  131*   K  4.6  4.1  4.2   CL  90*  94*  100   CO2  31  28  23   MG   --   2.1   --      Rece femoral artery branches. -Weakly opacified and stenotic popliteal artery without occlusion. -Limited evaluation of runoff vessels do to severe atherosclerosis. The posterior tibial artery appears to remain patent. OTHER VESSELS: -See above for details.  Se

## 2018-04-24 NOTE — PLAN OF CARE
Problem: RESPIRATORY - ADULT  Goal: Achieves optimal ventilation and oxygenation  INTERVENTIONS:  - Assess for changes in respiratory status  - Assess for changes in mentation and behavior  - Position to facilitate oxygenation and minimize respiratory effo Monitor for areas of redness and/or skin breakdown  - Initiate interventions, skin care algorithm/standards of care as needed   Outcome: Progressing    Goal: Incision(s), wounds(s) or drain site(s) healing without S/S of infection  INTERVENTIONS:  - Assess removal at bedside. Exploration of lower leg fasciotomies and extension of previous fasciotomy incisions / control of bleeding performed. Patient recovered in PCCU.  Returned to bed with OR nurse, transport and anesthesiologist. Melany Anderson / n/mauricio assessment perf

## 2018-04-24 NOTE — PROGRESS NOTES
[facility]    Progress Note    Krystal Whalen Patient Status:  Inpatient    3/1/1925 MRN [id number]   Location [facility] 2W/SW Attending Chester Crenshaw MD   Hosp Day # 2 PCP Candance Shack, MD       Assessment and Plan:     1.  Leg i legs warm to touch  Neuro: no focal deficits  Skin: no rashes or lesions,     Scheduled Meds:   • sodium chloride       • Sodium Chloride       • DilTIAZem HCl  30 mg Oral 4 times per day   • Pravastatin Sodium  20 mg Oral Nightly   • docusate sodium  100 versus technical limitations. 4. Atherosclerotic calcification aorta.      Dictated by (CST): Anselmo Barthel, MD on 4/24/2018 at 8:02     Approved by (CST): Anselmo Barthel, MD on 4/24/2018 at 8:06                  PACO Estrada  4/24/2018

## 2018-04-25 NOTE — PLAN OF CARE
Problem: RESPIRATORY - ADULT  Goal: Achieves optimal ventilation and oxygenation  INTERVENTIONS:  - Assess for changes in respiratory status  - Assess for changes in mentation and behavior  - Position to facilitate oxygenation and minimize respiratory effo Monitor for areas of redness and/or skin breakdown  - Initiate interventions, skin care algorithm/standards of care as needed   Outcome: Progressing    Goal: Incision(s), wounds(s) or drain site(s) healing without S/S of infection  INTERVENTIONS:  - Assess proper alignment of affected body part  INTERVENTIONS:  - Support and protect limb and body alignment per provider's orders  - Instruct and reinforce with patient and family use of appropriate assistive device and precautions (e.g. spinal or hip dislocatio

## 2018-04-25 NOTE — PROGRESS NOTES
[facility]    Progress Note    Yemimarya Arellano Patient Status:  Inpatient    3/1/1925 MRN [id number]   Location [facility] 2W/SW Attending Kelsey Yun MD   Hosp Day # 3 PCP Abbi Abdi MD     Subjective:     Respiratory: Posi 04/25/2018   BUN 32 (H) 04/25/2018    (L) 04/25/2018   K 4.1 04/25/2018    04/25/2018   CO2 27 04/25/2018    (H) 04/25/2018   CA 8.4 (L) 04/25/2018   ALB 3.3 (L) 04/23/2018   ALKPHO 46 04/23/2018   BILT 0.6 04/23/2018   TP 6.4 04/23/2018 renal artery ostial stenoses. 6. Postoperative changes in the right inguinal femoral region with a resultant hematoma.  Small foci of gas are seen and may relate to recent operative intervention, but correlation with physical exam findings is advised to ev

## 2018-04-25 NOTE — PLAN OF CARE
Patients granddaughter at bedside. Daughter Ruthann Shirley and daughter Salome Barriga called to check on patient. Discussed with all family members that they must know the password in order for any medical information to be shared.  Encouraged family to talk with patient's

## 2018-04-25 NOTE — PROGRESS NOTES
[facility]    Progress Note    Gilberto Tahiras Patient Status:  Inpatient    3/1/1925 MRN [id number]   Location [facility] 2W/SW Attending Cindy Batista MD   Hosp Day # 3 PCP Yeyo Huerta MD       SUBJECTIVE:  Pt states that she Intravenous PRN   0.9%  NaCl infusion  Intravenous Once   HYDROcodone-acetaminophen (NORCO) 5-325 MG per tab 1 tablet 1 tablet Oral Q6H PRN   furosemide (LASIX) injection 10 mg 10 mg Intravenous Once   Pantoprazole Sodium (PROTONIX) 40 mg in Sodium Chlorid controlled.     3. Chronic diastolic CHF (congestive heart failure) (HCC)        Compensated.     4. HTN (hypertension)        Stable.     5. Iron deficiency anemia        Resume Iron supplements. Transfuse 1 unit PRBC for Hgb<7.0     6. David Hauser

## 2018-04-25 NOTE — SPIRITUAL CARE NOTE
[facility], as a courtesy to inform that the patient had been admitted to CCU. This was per family request. Mary Hassan to be notified.

## 2018-04-25 NOTE — PROGRESS NOTES
04/25/18 1000   Clinical Encounter Type   Visited With Patient and family together   Routine Visit Introduction   Continue Visiting Yes   Crisis Visit Critical care   Referral From (Palliative Care)   Referral To [address] [place] [address]

## 2018-04-25 NOTE — PROGRESS NOTES
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 4. Short segment occlusion of the proximal superior mesenteric artery with distal reconstitution. 5. Moderate left and moderate to severe right renal artery ostial stenoses.   6. Postoperative c MD RE on 4/24/2018 at 8:02     Approved by (CST): Prema Cardoza MD on 4/24/2018 at 8:06                        Prem Kelly.  Severino Whalen MD  4/25/2018

## 2018-04-25 NOTE — PROGRESS NOTES
I change the patient's fasciotomy wound dressings. The anterior compartment wounds were essentially closed and the remaining open areas were treated with Steri-Strips.   The medial compartment wound has some areas that require saline gauze and Xeroform lester

## 2018-04-25 NOTE — CONSULTS
[facility]  Palliative Care Follow Up    Vikki Keenan Patient Status:  Inpatient    3/1/1925 MRN [id number]   Location [facility] 2W/SW Attending Sara Collier MD   Hosp Day # 3 PCP Barak Tran MD     Date of Consult:  Systems:  Pertinent items are noted in subjective.       Allergies:    Crestor [Rosuvastat*      Digitek [Digoxin]         Iodine I 131 Tositu*      Latex                     Lisinopril                Peanuts                   Penicillins               [place] acetaminophen (TYLENOL) 160 MG/5ML oral liquid 650 mg, 650 mg, Oral, Q6H PRN **OR** [DISCONTINUED] acetaminophen (TYLENOL) 650 MG rectal suppository 650 mg, 650 mg, Rectal, Q6H PRN  •  PEG 3350 (MIRALAX) powder packet 17 g, 17 g, Oral, Daily PRN  •  bisaco patent with flow in the dorsalis pedis, the distal aspect of the dorsalis pedis is not well opacified.   2. Left Lower Extremity: No hemodynamically significant stenosis of the iliac arteries; patency of the common femoral artery; no visible flow in the jacqueline fatty atrophy of the musculature. 15. Lesser incidental findings as above. A preliminary report was issued by the [address] [place] [address] Radiology teleradiology service. There are no major discrepancies.    Dictated by (CST): Erica Murrieta MD on 4/24/2018 at 8:29 : Yes  Advance Directive: Copy on chart  Type of Healthcare Directive: Durable power of  for health care; Health care treatment directive  Healthcare Agent Appointed: Yes  Healthcare Agent's Name: Jennifer Saver (son)   Healthcare Agent's Phone Number: community palliative care following pending clinical course. Not ready for hospice. SW to help with planning  -Ongoing GOC discussions will be needed pending course  -Pt is DNR, DNI, no g-tube and continue supportive care.   -Provided emotional support to p

## 2018-04-25 NOTE — PROGRESS NOTES
The patient is awake and alert. She is not complaining of any pain. Both feet have capillary refill. She does not complaining of any foot pain. Both feet are the same temperature.   The left foot has a very slight dusky pink appearance as compared to th

## 2018-04-25 NOTE — PLAN OF CARE
Maintains optimal cardiac output and hemodynamic stability Not Progressing    Still in afib; on cardizem drip. Bp occ dips down into 23'B -39'Q systolic.    Achieves optimal ventilation and oxygenation Not Progressing    Pt sats in mid to upper 90's on 3-4L

## 2018-04-26 NOTE — CONSULTS
[facility]  Palliative Care Follow Up    Rosales Johnson Patient Status:  Inpatient    3/1/1925 MRN [id number]   Location [facility] 2W/SW Attending Jeramy Esqueda MD   Hosp Day # 4 PCP Brayan Resendiz MD     Date of Consult:  BID  •  dextrose 5 %-0.45 % NaCl infusion, , Intravenous, Continuous  •  Pravastatin Sodium (PRAVACHOL) tab 20 mg, 20 mg, Oral, Nightly  •  rOPINIRole HCl (REQUIP) tab 1 mg, 1 mg, Oral, Nightly  •  ferrous sulfate EC tab 325 mg, 325 mg, Oral, Daily with br 6.4 04/23/2018   AST 41 04/23/2018   ALT 17 04/23/2018   MG 2.1 04/23/2018   TROP 0.02 04/21/2018       Imaging:        Objective:  Vital Signs:  Blood pressure 112/46, pulse 73, temperature 98 °F (36.7 °C), temperature source Temporal, resp.  rate 21, heig embolectomy/common femoral endarterectomy/fasciotomy on 4/22, back to OR 4/23 for exploration/control of bleeding, per surgery  -Son does NOT want any further surgeries right now.     Iliac artery thrombosis (HCC)    Atrial fibrillation, persistent ([address]  - examination, and >50% was spent counseling and coordinating care. Discussed today's visit with Boo Bang RN    I will continue to follow clinically.     Monica [facility] [id number]  4/26/2018  10:57  AM

## 2018-04-26 NOTE — PROGRESS NOTES
[facility]     Progress Note        Erica Age Patient Status:  Inpatient    3/1/1925 MRN [id number]   Location [facility] 2W/SW Attending Dewayne Ruiz MD   Hosp Day # 4 PCP Jamil Rodgers MD       Subjective:   Patient seen a (CARDIZEM) premix/add-vantage 2.5-20 mg/hr Intravenous Continuous   DilTIAZem HCl (CARDIZEM) tab 60 mg 60 mg Oral PRN   acetaminophen (TYLENOL) tab 650 mg 650 mg Oral Q6H PRN   PEG 3350 (MIRALAX) powder packet 17 g 17 g Oral Daily PRN   bisacodyl (DULCOLAX for hemoglobin below 7  -Okay to transfer to floor  -Patient DNR/DNI.     Dannielle Doty, DO  Pulmonary [address] [place] [address]

## 2018-04-26 NOTE — PROGRESS NOTES
[place] [facility]    Progress Note    Elle Tao Patient Status:  Inpatient    3/1/1925 MRN [id number]   Location [facility] 2W/SW Attending Donna Montez MD   Hosp Day # 4 PCP Rosario Storm MD       Interval History:   Awake, not 16.1*  10.9   --   10.8   PLT  178  155   --   156     Recent Labs   Lab  04/24/18   0430  04/25/18   0514  04/26/18   0430   GLU  132*  161*  110*   BUN  48*  32*  25*   CREATSERUM  1.39  0.97  0.85   GFRAA  38*  58*  >60   GFRNAA  33*  51*  59*   CA  7.9 ostial stenoses. 6. Postoperative changes in the right inguinal femoral region with a resultant hematoma.  Small foci of gas are seen and may relate to recent operative intervention, but correlation with physical exam findings is advised to evaluate for po fibrillation, persistent (HCC)   HR controlled,continue Cardizem. Chronic diastolic CHF (congestive heart failure) (HCC)  Compensated. HTN (hypertension)  Stable. Iron deficiency anemia  Resume Iron supplements.      Bleeding surgical site

## 2018-04-26 NOTE — PHYSICAL THERAPY NOTE
PHYSICAL THERAPY EVALUATION - INPATIENT     Room Number: 223/223-A  Evaluation Date: 4/26/2018  Type of Evaluation: Initial        Presenting Problem:  (weakness)  Reason for Therapy: Mobility Dysfunction and Discharge Planning    PHYSICAL THERAPY ASSESSM COLONOSCOPY N/A      Comment: Procedure: COLONOSCOPY;  Surgeon: Alexy Tan MD;  Location: [address] [place] [address] ENDOSCOPY  01/2018: EGD      Comment: bleeding duodenal avm  No date: HYSTERECTOMY  No date: REMOVAL GALLBLADDER  No date: TOTAL HIP REPLACE mod Ax2    Transfers:     Exercise/Education Provided:  Bed mobility  Gait training  Transfer training    Patient End of Session: In bed    CURRENT GOALS    Goals to be met by: 5/3/18  Patient Goal Patient's self-stated goal is: to go home   Goal #1 Melissa

## 2018-04-26 NOTE — PROGRESS NOTES
[facility]    Progress Note    Oliver Vera Patient Status:  Inpatient    3/1/1925 MRN [id number]   Location [facility] 2W/SW Attending Lynda Avelar MD   Hosp Day # 4 PCP Bharath Amin MD     Subjective:     Respiratory: Posi CREATSERUM 0.85 04/26/2018   BUN 25 (H) 04/26/2018    (L) 04/26/2018   K 3.7 04/26/2018    04/26/2018   CO2 26 04/26/2018    (H) 04/26/2018   CA 8.4 (L) 04/26/2018   ALB 3.3 (L) 04/23/2018   ALKPHO 46 04/23/2018   BILT 0.6 04/23/2018

## 2018-04-27 NOTE — PROGRESS NOTES
04/27/18 1400   Provider Notification   Reason for Communication Change in status   Provider Name Other (comment)  (Najjar)   Method of Communication Call   Response At bedside  (He will be up to see her in an hour)     Called Dr. Shyann Cobb because patient

## 2018-04-27 NOTE — PROGRESS NOTES
[facility]    Progress Note    Rosales Johnson Patient Status:  Inpatient    3/1/1925 MRN [id number]   Location [facility] 2W/SW Attending Mirta Patricia MD   Hosp Day # 5 PCP Brayan Resendiz MD       Interval History:   Awake, comp Lab  04/25/18   0514  04/26/18   0430  04/27/18   0505   GLU  161*  110*   --    BUN  32*  25*   --    CREATSERUM  0.97  0.85   --    GFRAA  58*  >60   --    GFRNAA  51*  59*   --    CA  8.4*  8.4*   --    NA  135*  134*   --    K  4.1  3.7  3.8   CL  10

## 2018-04-27 NOTE — CONSULTS
[facility] [facility]  Palliative Care Follow Up    Shelley Nelson Patient Status:  Inpatient    3/1/1925 MRN [id number]   Location [facility] 2W/SW Attending Lawrence Morris MD   Hosp Day # 5 PCP [place] Aquino MD     Date of Consult:  mg, Oral, QAM AC  •  DilTIAZem HCl ER Coated Beads (CARDIZEM CD) 24 hr cap 180 mg, 180 mg, Oral, Daily  •  Normal Saline Flush 0.9 % injection 10 mL, 10 mL, Intravenous, PRN  •  HYDROcodone-acetaminophen (NORCO) 5-325 MG per tab 1 tablet, 1 tablet, Oral, Q 04/22/2018   PTT 73.6 (H) 04/23/2018       Chemistry:    Lab Results  Component Value Date   CREATSERUM 0.85 04/26/2018   BUN 25 (H) 04/26/2018    (L) 04/26/2018   K 3.8 04/27/2018    04/26/2018   CO2 26 04/26/2018    (H) 04/26/2018   CA Order:  Yes, notify physician for order  Describe Patient Wishes: DNR,DNI, no g-tube, and continue supportive care    Spiritual needs addressed: Referral placed for Spiritual Care    Disposition: SNF palliative care    Procedures:  No intubation  No g-tube adequately.     Advance care planning  -Pt's son Barb Parker is [facility] #[phone]  -Updated POLST form in EPIC/chart    Palliative Performance Scale 20%  -Severe PVD,  Afib off AC d/t complications, h/o GIB, diastolic CHF, CAD, anemia, dementia, functional declin

## 2018-04-27 NOTE — PROGRESS NOTES
[facility]    Progress Note    Diana Zuniga Patient Status:  Inpatient    3/1/1925 MRN [id number]   Location [facility] 4W/SW/SE Attending Ami Crowell MD   Hosp Day # 5 PCP Leo Lane MD     Subjective:   Subjective:  Krystal Martinez

## 2018-04-27 NOTE — PLAN OF CARE
Problem: RESPIRATORY - ADULT  Goal: Achieves optimal ventilation and oxygenation  INTERVENTIONS:  - Assess for changes in respiratory status  - Assess for changes in mentation and behavior  - Position to facilitate oxygenation and minimize respiratory effo activity as appropriate  - Communicate ordered activity level and limitations with patient/family   Outcome: Progressing      Problem: Patient Centered Care  Goal: Patient preferences are identified and integrated in the patient's plan of care  Interventio

## 2018-04-27 NOTE — PROGRESS NOTES
[facility]    Progress Note    Zaira Garnett Patient Status:  Inpatient    3/1/1925 MRN [id number]   Location [facility] 2W/SW Attending Fate Halsted, MD   Hosp Day # 5 PCP Hlaley Ochoa MD     Subjective:     Respiratory: Posi 04/26/2018   BUN 25 (H) 04/26/2018    (L) 04/26/2018   K 3.8 04/27/2018    04/26/2018   CO2 26 04/26/2018    (H) 04/26/2018   CA 8.4 (L) 04/26/2018   ALB 3.3 (L) 04/23/2018   ALKPHO 46 04/23/2018   BILT 0.6 04/23/2018   TP 6.4 04/23/2018

## 2018-04-27 NOTE — PHYSICAL THERAPY NOTE
Per RN patient is very sick and weak today, under palliative care at present and working to turn hospice.

## 2018-04-28 PROBLEM — I73.9 PVD (PERIPHERAL VASCULAR DISEASE) (HCC): Status: ACTIVE | Noted: 2018-01-01

## 2018-04-28 NOTE — PROGRESS NOTES
04/28/18 1050   Provider Notification   Reason for Communication Critical value   Test/Procedure Name elevated heart rate   Provider Name Other (comment)  Ibis ANTONIO)   Method of Communication Call   Response See orders   Notification Time 1052       Pa

## 2018-04-28 NOTE — PLAN OF CARE
MUSCULOSKELETAL - ADULT    • Return mobility to safest level of function Not Progressing    • Maintain proper alignment of affected body part Not Progressing          CARDIOVASCULAR - ADULT    • Maintains optimal cardiac output and hemodynamic stability Pr

## 2018-04-28 NOTE — SIGNIFICANT EVENT
Trupti Carranza has been having uncontrolled full body pain today, unable to speak or communicate besides crying out in pain. Spoke with Veto Aquino who decided to meet with hospice team. Trupti Carranza is now transitioning into hospice care with consent of her POA.  Trupti Carranza has

## 2018-04-28 NOTE — PLAN OF CARE
CONFUSION    • Confusion, delirium, dementia or psychosis is improved or at baseline Progressing        COPING    • Pt/Family able to verbalize concerns and demonstrate effective coping strategies Progressing        DEATH & DYING    • Pt/Family communicate

## 2018-04-28 NOTE — CM/SW NOTE
MD order received regarding hospice eval.  [facility]. [facility] meet with the pt. And family.       [phone]

## 2018-04-28 NOTE — OPERATIVE REPORT
Pre-Operative Diagnosis: Possible postoperative hematoma, compartment syndrome     Post-Operative Diagnosis: Superficial bleeding from points on skin where staples perforate, no hematoma, no compartment syndrome      Procedure Performed:   1.   Exploration inspected the posterior compartments and there was no hematoma. I then pulled all bleeding from skin cautery. Because of her dementia and the difficulty to examine her I decided to extend both my incisions inferiorly for complete fasciotomy releases.   Us

## 2018-04-28 NOTE — PROGRESS NOTES
[place] [facility]    Progress Note    Emmy North Patient Status:  Inpatient    3/1/1925 MRN [id number]   Location [facility] 4W/SW/SE Attending Starla Millard MD   Hosp Day # 6 PCP Dolph Hammans, MD       SUBJECTIVE:  Pt awake, in pa PRN   Senna-Docusate Sodium (SENOKOT S) 8.6-50 MG tab 1 tablet 1 tablet Oral BID   dextrose 5 %-0.45 % NaCl infusion  Intravenous Continuous   Pravastatin Sodium (PRAVACHOL) tab 20 mg 20 mg Oral Nightly   rOPINIRole HCl (REQUIP) tab 1 mg 1 mg Oral Nightly

## 2018-04-28 NOTE — HOSPICE RN NOTE
[facility] DX PVD for pain control. LAKSHMI Hernandez signed consents. Updated covered MD Dr Pamella Alves for Dr Ben Boyd Director Approved GIP status as well. DNR.

## 2018-04-28 NOTE — PROGRESS NOTES
04/28/18 1033   Provider Notification   Reason for Communication Change in status;Critical value   Test/Procedure Name Heart rate sustaining 120s-150s   Provider Name Other (comment)  (Dr. Alejandra Cockayne)   Method of Communication Call   Response No new

## 2018-04-28 NOTE — PROGRESS NOTES
The patient is doing well overall but continues to have left calf pain. This pain is only present when the calf is squeezed but not on passive flexion. Both feet are equally warm. The nurses have already changed the right fasciotomy wound.   We will reex

## 2018-04-29 NOTE — PROGRESS NOTES
[facility]    Progress Note    Jaleesa Aquino Patient Status:  Inpatient    3/1/1925 MRN [id number]   Location [facility] 4W/SW/SE Attending Hanny Soares MD   Hosp Day # 1 PCP Harsha Sprague MD       SUBJECTIVE:  Magdalena Fields. bisacodyl (DULCOLAX) rectal suppository 10 mg 10 mg Rectal Daily PRN   ondansetron (ZOFRAN-ODT) disintegrating tab 4 mg 4 mg Oral Q6H PRN   Or      ondansetron HCl (ZOFRAN) injection 4 mg 4 mg Intravenous Q6H PRN   HYDROmorphone HCl PF (DILAUDID) 10 mg i

## 2018-04-29 NOTE — HOSPICE RN NOTE
GIP day 2- pt unresponsive having periods of apnea. She is now controlled with her dilaudid drip infusing.  visit for support. Family at bedside.

## 2018-04-30 NOTE — PLAN OF CARE
Son Barb Parker notified this RN that he was looking for skein of pink yarn and knitting needles that was missing from the room - the family last remember seeing it early Saturday afternoon.   This has sentimental value to them because the patient had been knitting

## 2018-04-30 NOTE — H&P
[facility] Patient Status:  Inpatient    3/1/1925 MRN [id number]   Location [facility] 4W/SW/SE Attending Jeff Lawrence MD   Hosp Day # 2 PCP Mike Jeffrey MD     Date:  2018  Date of A Physical Exam:   Physical Exam    Constitutional: She appears ill. Cardiovascular: Normal rate. Pulmonary/Chest: She has rales. Abdominal: Soft. Neurological: She is unresponsive. Skin: She is diaphoretic. There is pallor.        Results:

## 2018-04-30 NOTE — HOSPICE RN NOTE
GIP DAY 3  Patient admitted for Peripheral Vascular Disease and her pain is being managed with Dilaudid drip  Patient not responsive today  Congestion is being treated with IV Robinul and IV Lasix  Dark urine in taylor bag  Bilateral edema to upper extremit

## 2018-04-30 NOTE — PROGRESS NOTES
[facility]    Progress Note    Krystal Whalen Patient Status:  Inpatient    3/1/1925 MRN [id number]   Location [facility] 4W/SW/SE Attending Chester Crenshaw MD   Hosp Day # 2 PCP Candance Shack, MD       Interval History:   Sydney Meyers

## 2018-04-30 NOTE — CM/SW NOTE
ROSEMARY hollins 4/30/18. MSW met with POA/Son and DTR at pt bedside. The family is managing well, grieving appropriately, understands the status of their mother. Several other children  are likely to come. MSW provided support,validation. MSW informed family to ke

## 2018-05-01 NOTE — PLAN OF CARE
CONFUSION    • Confusion, delirium, dementia or psychosis is improved or at baseline Not Progressing          COPING    • Pt/Family able to verbalize concerns and demonstrate effective coping strategies Progressing        DEATH & DYING    • Pt/Family commu

## 2018-05-01 NOTE — HOSPICE RN NOTE
GIP day 4- pt appears comfortable at this time. Dilaudid drip infusing at 1.2mg/hr, pt required Lasix x2 and Robinul x7 for end stage congestion. Family remains at bedside.

## 2018-05-01 NOTE — PROGRESS NOTES
[place] [facility]    Progress Note    Vandana Hassan Patient Status:  Inpatient    3/1/1925 MRN [id number]   Location [facility] 4W/SW/SE Attending Jeff Lawrence MD   Hosp Day # 3 PCP Mike Jeffrey MD       Interval History:   Anuradha Loredo

## 2018-05-02 NOTE — SIGNIFICANT EVENT
Pt  at [address]. Resusitation not attempted as pt was DNR. Time of Death: 36    Family Notified: Y,  daughter at bedside     MD: Dr. Irene Rizvi of [facility] Notified: JAY Rush.  [phone])

## 2018-05-04 NOTE — DISCHARGE SUMMARY
[place] [facility] - [facility]    Discharge Summary    Marquis Givens Patient Status:  Inpatient    3/1/1925 MRN [id number]   Location [facility] 4W/SW/SE Attending No att. providers found   Hosp Day # 6 PCP Lorena Hall MD     Date of Admission:  Physician  Rita Corral MD Consulting Physician  PULMONARY DISEASES    Naya Guerrero MD Consulting Physician  Internal Medicine    Vincent Lindsey MD Consulting Physician  Family Medicine    Devi Howell MD Consulting Physician

## 2018-05-04 NOTE — DISCHARGE SUMMARY
[place] [facility] - [facility]    Discharge Summary    Krystal Whalen Patient Status:  Inpatient    3/1/1925 MRN [id number]   Location [facility] 4W/SW/SE Attending No att. providers found   Hosp Day # 4 PCP Candance Shack, MD     Date of Admission:

## 2019-02-28 VITALS
HEART RATE: 73 BPM | OXYGEN SATURATION: 94 % | HEIGHT: 61 IN | WEIGHT: 164 LBS | DIASTOLIC BLOOD PRESSURE: 60 MMHG | SYSTOLIC BLOOD PRESSURE: 112 MMHG | BODY MASS INDEX: 30.96 KG/M2

## 2019-02-28 VITALS
HEIGHT: 60 IN | BODY MASS INDEX: 33.57 KG/M2 | OXYGEN SATURATION: 98 % | SYSTOLIC BLOOD PRESSURE: 114 MMHG | HEART RATE: 62 BPM | DIASTOLIC BLOOD PRESSURE: 50 MMHG | WEIGHT: 171 LBS

## 2019-02-28 VITALS
OXYGEN SATURATION: 91 % | DIASTOLIC BLOOD PRESSURE: 70 MMHG | SYSTOLIC BLOOD PRESSURE: 122 MMHG | HEART RATE: 70 BPM | BODY MASS INDEX: 33.38 KG/M2 | WEIGHT: 170 LBS | HEIGHT: 60 IN

## 2019-02-28 VITALS
HEART RATE: 81 BPM | WEIGHT: 164 LBS | DIASTOLIC BLOOD PRESSURE: 58 MMHG | SYSTOLIC BLOOD PRESSURE: 130 MMHG | HEIGHT: 60 IN | OXYGEN SATURATION: 90 % | BODY MASS INDEX: 32.2 KG/M2

## 2019-03-01 VITALS
SYSTOLIC BLOOD PRESSURE: 134 MMHG | HEIGHT: 60 IN | DIASTOLIC BLOOD PRESSURE: 60 MMHG | HEART RATE: 78 BPM | WEIGHT: 156 LBS | BODY MASS INDEX: 30.63 KG/M2 | OXYGEN SATURATION: 92 %

## 2019-03-01 VITALS — DIASTOLIC BLOOD PRESSURE: 50 MMHG | SYSTOLIC BLOOD PRESSURE: 134 MMHG | RESPIRATION RATE: 16 BRPM | HEART RATE: 59 BPM

## 2020-12-03 NOTE — BRIEF OP NOTE
Anesthesia Note:    Epidural catheter was dc'd with tip intact.  No erythema at site.  Pt may get back wet after 4 pm.  No blood thinners until after 4 pm.  Encouraged ambulation and oral pain meds for pain control.    Anshu Julian MD  Anesthesia  [phone] (pager)      
Pre-Operative Diagnosis: Possible postoperative hematoma, compartment syndrome     Post-Operative Diagnosis: Superficial bleeding from points on skin where staples perforate, no hematoma, no compartment syndrome      Procedure Performed:   1.   Exploration
Pre-Operative Diagnosis: thrombosis of right leg     Post-Operative Diagnosis: same     Procedure Performed:   1. Right iliofemoral forgarty embolectomy  2. Right common femoral endarterectomy  3.  Four compartemtn fasciotomy    Surgeon(s) and Role:     * G
Cardiac Monitor/Defib/ACLS/Rescue Kit/O2/BVM/pulse ox/oxygen

## 2021-02-05 NOTE — PROGRESS NOTES
[facility]    Progress Note    Shelley Nelson Patient Status:  Inpatient    3/1/1925 MRN [id number]   Location [facility] 3W/SW Attending Lawrence Morris MD   Hosp Day # 5 PCP South Aquino MD       Subjective:   Feels good, a littl 274 01/23/2018       Xr Chest Pa + Lat Chest (cpt=71046)    Result Date: 2/10/2018  CONCLUSION:  1. Borderline cardiomegaly. 2. Atherosclerosis. 3. Hyperinflation. 4. Scarring/atelectasis.  5. Prominent markings at the left base and small pneumonia not excl Topical Anesthesia?: BLT cream (benzocaine 20%, lidocaine 6%, tetracaine 6%) Location #1: face Depth In Mm: 1.5 Consent: Written consent obtained, risks reviewed including but not limited to pain, scarring, infection and incomplete improvement. Patient understands the procedure is cosmetic in nature and will require out of pocket payment. Treatment Number (Optional): 4 Detail Level: Zone Infusions (Optional): PRP

## 2022-10-18 NOTE — PROGRESS NOTES
[facility]    Progress Note    Zaira Garnett Patient Status:  Inpatient    3/1/1925 MRN [id number]   Location [facility] 4W/SW/SE Attending Fate Halsted, MD   Hosp Day # 1 PCP Halley Ochoa MD     Subjective:  Zaira Garnett is Which Kenalog Vial Was Used?: Kenalog 10 mg/ml (5 ml vial)

## 2022-11-04 NOTE — DISCHARGE PLANNING
SW confirmed w/ [facility] that referral was placed on pt. SW informed [facility] that pt is anticipated to d/c today. RN contacted [facility] stating that pt will be needing ambulance home due to O2 needs. [facility] set pt up w/ 11a ambulance through [facility].  RN informed pt and p Olumiant Counseling: I discussed with the patient the risks of Olumiant therapy including but not limited to upper respiratory tract infections, shingles, cold sores, and nausea. Live vaccines should be avoided.  This medication has been linked to serious infections; higher rate of mortality; malignancy and lymphoproliferative disorders; major adverse cardiovascular events; thrombosis; gastrointestinal perforations; neutropenia; lymphopenia; anemia; liver enzyme elevations; and lipid elevations.

## 2023-06-08 NOTE — DISCHARGE PLANNING
Per CTL discussion, pt is from home with caretaker 5x/wk. Home dme includes home O2, nebulizer, walker, and bedside commode. Pt has refused the therapy evaluation due to pain.  Pt remains Observation status and would not have benefits for room and board at none

## 2024-06-02 NOTE — PROGRESS NOTES
Daljit Whittaker  : 3/1/1925  Age 80year old  female patient is admitted to [facility] 3/17/17 for Rehabilitation    Admitted to [facility] AND [facility] on: 3/14/17 to 3/17/17    Chief complaint:Peripheral edema    Inability to ambulate due to knee    [facility] Family care plan done today via phone, discharge 4/5/17    CURRENT MEDICATIONS -reviewed and updated    Current Outpatient Prescriptions:  acetaminophen 325 MG Oral Tab Take 2 tablets (650 mg total) by mouth every 6 (six) hours as needed.  Disp: 60 tablet R AT BEDTIME (Patient taking differently: 1 cap at bedtime) Disp: 60 capsule Rfl: 0       VITALS:  /64 mmHg  Pulse 70  Temp(Src) 97 °F (36.1 °C) (Tympanic)  Resp 18  Wt 170 lb (77.111 kg)  SpO2 96%     REVIEW OF SYSTEMS:  GENERAL HEALTH:feels well othe tenderness.   :no suprapubic distension  LYMPHATIC:no lymphedema but skin very dry lower extremities and discolored  MUSCULOSKELETAL: no acute synovitis upper or lower extremity  EXTREMITIES/VASCULAR:no cyanosis, clubbing or edema  NEUROLOGIC: intact; no No

## 2025-05-05 NOTE — PROGRESS NOTES
- stable  - Patient follows with Cardiology  - Continue aspirin + statin daily   04/28/18 1457   Clinical Encounter Type   Visited With Patient and family together   Routine Visit Introduction   Continue Visiting Yes   Patient's Supportive Strategies/Resources prayer support/family   Referral From Verbal   Referral To Other (Comment

## (undated) DEVICE — 3M™ TEGADERM™ TRANSPARENT FILM DRESSING FRAME STYLE 1629: Brand: 3M™ TEGADERM™

## (undated) DEVICE — FOGARTY ARTERIAL EMBOLECTOMY CATHETER 4F 40CM: Brand: FOGARTY

## (undated) DEVICE — Device: Brand: DEFENDO AIR/WATER/SUCTION AND BIOPSY VALVE

## (undated) DEVICE — DRAPE TAPE: Brand: CONVERTORS

## (undated) DEVICE — SPONGE LAP 18X18 XRAY STRL

## (undated) DEVICE — SUTURE VICRYL 2-0 CT-1

## (undated) DEVICE — FOGARTY ARTERIAL EMBOLECTOMY CATHETER 3F 80CM: Brand: FOGARTY

## (undated) DEVICE — DRAPE SRG 26X15IN UTL TPE STRL

## (undated) DEVICE — COVER SGL STRL LGHT HNDL BLU

## (undated) DEVICE — SUTURE ETHILON 3-0 669H

## (undated) DEVICE — SUTURE SILK 4-0 SA63H

## (undated) DEVICE — ENDOSCOPY PACK - LOWER: Brand: MEDLINE INDUSTRIES, INC.

## (undated) DEVICE — GAUZE SPONGES,12 PLY: Brand: CURITY

## (undated) DEVICE — SUTURE SILK 2-0 SA65H

## (undated) DEVICE — SUTURE PROLENE 5-0 C-1

## (undated) DEVICE — Device

## (undated) DEVICE — SUTURE PROLENE 6-0 C-1

## (undated) DEVICE — FOGARTY ARTERIAL EMBOLECTOMY CATHETER 4F 80CM: Brand: FOGARTY

## (undated) DEVICE — ENDOSCOPY PACK UPPER: Brand: MEDLINE INDUSTRIES, INC.

## (undated) DEVICE — PROXIMATE SKIN STAPLERS (35 WIDE) CONTAINS 35 STAINLESS STEEL STAPLES (FIXED HEAD): Brand: PROXIMATE

## (undated) DEVICE — SUCTION CANISTER, 3000CC,SAFELINER: Brand: DEROYAL

## (undated) DEVICE — 1 ML INSULIN SYRINGE REGULAR LUER TIP: Brand: MONOJECT

## (undated) DEVICE — SOL  .9 1000ML BTL

## (undated) DEVICE — BANDAGE ROLL,100% COTTON, 6 PLY, LARGE: Brand: KERLIX

## (undated) DEVICE — SUTURE VICRYL 3-0 SH

## (undated) DEVICE — SURGICEL POWDER 3 GRAMS

## (undated) DEVICE — SUTURE SILK 3-0 SH

## (undated) DEVICE — LOWER EXTREMITY: Brand: MEDLINE INDUSTRIES, INC.

## (undated) DEVICE — SUTURE VICRYL 3-0 RB-1

## (undated) DEVICE — DRAPE SHEET LG

## (undated) DEVICE — 3M™ IOBAN™ 2 ANTIMICROBIAL INCISE DRAPE 6650EZ: Brand: IOBAN™ 2

## (undated) DEVICE — STERILE POLYISOPRENE POWDER-FREE SURGICAL GLOVES: Brand: PROTEXIS

## (undated) DEVICE — ABDOMINAL PAD: Brand: CURITY

## (undated) DEVICE — ABSORBABLE HEMOSTAT (OXIDIZED REGENERATED CELLULOSE, U.S.P.): Brand: SURGICEL

## (undated) DEVICE — HEMOCLIP HORIZON SM MULTI

## (undated) DEVICE — CV: Brand: MEDLINE INDUSTRIES, INC.

## (undated) DEVICE — REM POLYHESIVE ADULT PATIENT RETURN ELECTRODE: Brand: VALLEYLAB

## (undated) DEVICE — INTENDED TO BE USED TO OCCLUDE, RETRACT AND IDENTIFY ARTERIES, VEINS, TENDONS AND NERVES IN SURGICAL PROCEDURES: Brand: STERION®  VESSEL LOOP

## (undated) DEVICE — CATH GOLD PROBE HEMOGLIDE 7FR

## (undated) DEVICE — SUTURE SILK 3-0 SA64H

## (undated) NOTE — LETTER
[facility]  Administration of Anesthesia  1. Bharat Martínez, or _________________________________ acting on her behalf, (Patient) (Dependent/Representative) request to receive anesthesia for my pending procedure/operation/treatment.   PHILLIP p infections, high spinal block, spinal bleeding, seizure, cardiac arrest and death. 7. AWARENESS: I understand that it is possible (but unlikely) to have explicit memory of events from the operating room while under general anesthesia.   8. ELECTROCONVULSIV unconscious pt /Relationship    My signature below affirms that prior to the time of the procedure, I have explained to the patient and/or his/her guardian, the risks and benefits of undergoing anesthesia, as well as any reasonable alternatives.     _______

## (undated) NOTE — IP AVS SNAPSHOT
Patient Demographics     Address  [address] [place] [address] Phone  [phone] [facility]) *Preferred*      Emergency Contact(s)     Name Relation Home Work [facility] Son [phone]      Allergies as of 8/1/2017  Revie DilTIAZem HCl ER Coated Beads 240 MG Cp24  Commonly known as:  CARDIZEM CD  Next dose due: Tomorrow morning 8/2/17      Take 240 mg by mouth daily. Ferrous Sulfate 325 (65 Fe) MG Tabs  Next dose due:   Tomorrow morning 8/2/17 with breakfast Potassium Chloride ER 10 MEQ Tbcr  Commonly known as:  K-DUR  Next dose due: Tonight 8/2/17      Take 20 mEq by mouth 2 (two) times daily. Pravastatin Sodium 20 MG Tabs  Commonly known as:  PRAVACHOL  Next dose due:   Tonight 8/1/17      Take 20 m [id number] apixaban (ELIQUIS) tab 5 mg 08/01/17 0811 Given      [id number] aspirin EC tab 81 mg 08/01/17 0811 Given      [id number] ferrous sulfate EC tab 325 mg 08/01/17 0811 Given      [id number] furosemide (LASIX) tab 40 mg 07/31/17 1819 Given      [id number] 07/30/17 0336    Order Status:  Completed Lab Status:  Final result Updated:  07/30/17 0457    Specimen:  Other from [facility]      MRSA Screen By PCR Negative         H&P - H&P Note      H&P signed by Dewayne Ruiz MD at 7/30/2017  4:06 PM  Version 1 of 1 that she drinks alcohol. She reports that she does not use drugs. Allergies/Medications:    Allergies:    Corn                      Crestor [Rosuvastat*      Digitek [Digoxin]         Iodine I 131 Tositu*      Latex                     Lisinopril aspirin 81 MG Oral Tab Take 81 mg by mouth daily.  Disp:  Rfl:  Taking   Ferrous Sulfate 325 (65 FE) MG Oral Tab Take 325 mg by mouth daily with breakfast.   Disp:  Rfl: 0 Taking   Pantoprazole Sodium (PROTONIX) 40 MG Oral Tab EC Take 1 tablet by mouth alessandro evaluation. Dawosn Hobson MD  7/30/2017  3:57 PM[IM.1]    Electronically signed by Siri Talavera MD on 7/30/2017  4:06 PM   Attribution Ponce    IM. 1 - Siri Talavera MD on 7/30/2017  3:57 PM                     D/C Summary     No notes of this typ while in bed prior to further activity. [TF.2] Pt will benefit from ongoing PT to continue to improve strength, endurance, balance, and gait in order to facilitate to[TF.1] her[TF.2] highest  functional skills.  Recommend[TF.1] OPPT (to address torticollis i Raw Score: 16   PT Approx Degree of Impairment Score: 54.16%   Standardized Score (AM-PAC Scale): 40.78   CMS Modifier (G-Code): CK    FUNCTIONAL ABILITY STATUS  Gait Assessment   Gait Assistance:  Moderate assistance  Distance (ft): 25  Assistive Device: R TF.1 Trinh DAVIS, PT on 8/1/2017  2:05 PM  TF.2 - Chino Rhodes, PT on 8/1/2017  4:10 PM  TF.3 - Chino Rhodes, PT on 8/1/2017  2:08 PM               Physical Therapy Note signed by Chino Rhodes, PT at 7/31/2017  3:09 P be able to assist pt in her needs at home. Highly recommend pt to be d/c to home with OPPT to address torticollis. [TF.4]      DISCHARGE RECOMMENDATIONS  PT Discharge Recommendations: Outpatient PT (to address torticollis/neck pain)    PLAN  PT Treatment Pl home: 1 person half of the day and another person in PM. Son stays with her at night.[TF.4]    [address] with mov;t[TF.4]    PHYSICAL THERAPY EXAMINATION     OBJECTIVE  Precautions:  (pain, slower processing)  Fall Risk: Standard fall ri Transfers:[TF.1]mod/max A[TF.2]    Exercise/Education Provided:[TF.1]  Bed mobility  Body mechanics  Functional activity tolerated  Gait training  ROM  Transfer training[TF.2]    Patient End of Session: Up in chair;Needs met;Call light within reach;RN aw Principal Problem:    Torticollis      Past Medical History  Past Medical History:   Diagnosis Date   • Arthritis    • Chronic a-fib (HCC)    • Diastolic CHF, chronic (HCC)    • Diverticulitis    • Glaucoma    • HLD (hyperlipidemia)    • Hypothyroidism -   Moving from lying on back to sitting on the side of the bed?: A Lot   How much help from another person does the patient currently need. ..   -   Moving to and from a bed to a chair (including a wheelchair)?: Total   -   Need to walk in hospital room?: Goal #3 Patient is able to ambulate 20 feet with assist device: walker - rolling at assistance level: supervision   Goal #3   Current Status     Goal #4     Goal #4   Current Status     Goal #5 Patient to demonstrate independence with home activity/exercis deficits, maximizing patient's ability to return to prior level of function.  -   Pt seen for OT evaluation in cooperation with PT after RN approval - pt received in bed with family present - YUSUF able to provide more detailed hx of PLOF and current set up • Chronic a-fib (HCC)    • Diastolic CHF, chronic (HCC)    • Diverticulitis    • Glaucoma    • HLD (hyperlipidemia)    • Hypothyroidism    • Macular degeneration    • Osteoporosis    • Peripheral neuropathy (HCC)    • Restless leg syndrome    • Rheumatoid Pt follows instructions with increased time - fair carryover  Needs prompting and encouragement   Inaccurate historian   Decreased disability awareness       Behavioral/Emotional/Social: encouragement and review of importance of cervical stretching, light Patient End of Session: In bed;Up in chair;Needs met;Call light within reach;RN aware of session/findings; All patient questions and concerns addressed; Family present[KR.2]      OT Goals     Patient will complete functional transfer with CGA  Comment:     P

## (undated) NOTE — LETTER
[address], [place], [place]     AUTHORIZATION FOR SURGICAL OPERATION OR PROCEDURE    I hereby authorize Dr. Khurram Wen MD, my Physician(s) and whomever may be designated as the doctor's Assistant, to perform the fol 4. I consent to the photographing of procedure(s) to be performed for the purposes of advancing medicine, science and/or education, provided my identity is not revealed.  If the procedure has been videotaped, the physician/surgeon will obtain the original v (Witness signature)                                                                                                  (Date)                                (Time)  STATEMENT OF PHYSICIAN My signature below affirms that prior to the time of the procedure;  I

## (undated) NOTE — ED AVS SNAPSHOT
Ms. Isaak Bruce   MRN: [id number]    Department:  [facility] Emergency Department   Date of Visit:  3/25/2018           Disclosure     Insurance plans vary and the physician(s) referred by the ER may not be covered by your plan.  Please contac within the next three months to obtain basic health screening including reassessment of your blood pressure.     IF THERE IS ANY CHANGE OR WORSENING OF YOUR CONDITION, CALL YOUR PRIMARY CARE PHYSICIAN AT ONCE OR RETURN IMMEDIATELY TO THE EMERGENCY DEPARTMEN

## (undated) NOTE — LETTER
[facility]  Administration of Anesthesia  1. Rosio Baez, or _________________________________ acting on her behalf, (Patient) (Dependent/Representative) request to receive anesthesia for my pending procedure/operation/treatment.   PHILLIP mccrary infections, high spinal block, spinal bleeding, seizure, cardiac arrest and death. 7. AWARENESS: I understand that it is possible (but unlikely) to have explicit memory of events from the operating room while under general anesthesia.   8. ELECTROCONVULSIV (Date) (Time)                                                                                               (Responsible person in case of minor/ unconscious pt) /Relationship    My signature below affirms that prior to the time of the procedure, I have ex

## (undated) NOTE — IP AVS SNAPSHOT
Patient Demographics     Address  [address] [place] [address] Phone  [phone] [facility]) *Preferred*      Emergency Contact(s)     Name Relation Home Work [facility] Son [phone]      Allergies as of 1/26/2018  Revi TAKE 2 CAPSULES BY MOUTH AT BEDTIME   Mike Felipe MD         ipratropium-albuterol 0.5-2.5 (3) MG/3ML Soln  Commonly known as:  DUONEB  Next dose due:  * every 6 hours as needed *      Take 3 mL by nebulization every 6 (six) hours as needed.    OLEG TraMADol HCl 50 MG Tabs  Commonly known as:  ULTRAM  Next dose due:  * as needed *      Take 50 mg by mouth every 6 (six) hours as needed for Pain. Vitamin D 1000 units Caps  Next dose due: Tomorrow 1/27      Take 1,000 Units by mouth daily. Vitals  125/54 Filed at 01/26/2018 0853   Pulse  92 Filed at 01/26/2018 0853   Resp  20 Filed at 01/26/2018 0853   Temp  98.5 °F (36.9 °C) Filed at 01/26/2018 0853   SpO2  91 % Filed at 01/26/2018 0853      Patient's Most Recent Weight    Flowsheet Row Mos Ordering provider:  Adrianne Gaona MD  01/25/18 4045 Resulting lab:  [facility]    Specimen Information    Type Source Collected On   Blood — 01/26/18 0557          Components    Component Value Reference Range Flag Lab   WBC 9.7 4.0 - 11.0 K/UL — Jorgitozacarias Mycoplasma pneumonia PCR: Negative    Narrative: False positive results for Influenza A H1, Influenza A H3 or Influenza B may occur in patients with a recent history of receiving Flumist nasal vaccine.    There are no false positives associated with LUNGS:   Clear to auscultation. CARDIAC:   RRR, normal S1, S2; no S3 or murmur appreciated. ABDOMEN:   Bowel sounds normoactive. Soft, no organomegaly or masses appreciated. Nontender.     IMPRESSION:  GI bleeding      PLAN:  No significant changes to his • Macular degeneration    • Osteoporosis    • Peripheral neuropathy    • Restless leg syndrome    • Rheumatoid arthritis(714.0)    • Sleep apnea    • Thyroid disease    • Unspecified essential hypertension    • Wrist fracture     RT     Past Surgical Histo General: Alert and oriented x 3. Moderate respiratory distress. HEENT: Normocephalic, anicteric sclera, neck supple. Neck: Positive JVD, carotids 2+, no bruits. Cardiac: Regular rate and rhythm.  S1, S2 normal.  Soft systolic murmur, pericardial rub, S3 ------------------------------------------------------------------------------- Ac Whalen 89 03/01/1925 H: [id number]                                            E: [id number] Study date: Jan 24 2018 7:11AM Image quality was adequate. Scanning was performed from the parasternal, apical, and subcostal acoustic windows. Study completion:  The patient tolerated the procedure well. There were no complications. Transthoracic echocardiography.  M-mode, complete 2D, ------------------------------------------------------------------------------- 2D measurements                                             Normal Left ventricle LV internal dimension, ED, chordal level, PLAX *39.7 mm     43-52 LV internal dimension, ES, c Author:  Nallely Richards PT, [facility] Service:  (none) Author Type:  Physical Therapist    Filed:  1/25/2018  3:01 PM Date of Service:  1/25/2018  2:54 PM Status:  Signed    :  Nallely Richards PT, [facility] (Physical Therapist)        PHYSICAL THERAPY Management Techniques: Activity promotion; Body mechanics;Breathing techniques;Relaxation;Repositioning    BALANCE                                                                                                                     Static Sitting: Fair +  Dy Goal #1   Current Status Min A with side rail   Goal #2 Patient is able to demonstrate transfers MIN A into Rw. Goal #2  Current Status Mod A with RW from a chair   Goal #3 Patient is able to ambulate 20'x 2 with RW with CGA. May require chair follow. Follow for encouragement. Pt tolerated standing for 2 min. With RW for support. Pt declined toilet transfer with stating had just been to toilet with nursing. Pt remained up in chair at end of session with all  Needs within reach and chair alarm on. Grooming: supervision with set up and pt sitting  Feeding: supervision  Bathing: NT  Toileting: max assist  Upper Extremity Dressing: NT  Lower Extremity Dressing: NT    Education Provided: role of OT, HAYDEE UE ex, standing tolerance ex. , safety with transf . Pt required max assist for toileting at this time with pt standing. Pt returned to chair with sit to stand with mod assist , and pt ambulating 5 ft to chair with RW for support, min assist to transfer and increased time.  Prior to sitting in chair, pt t Shower Transfer: NT  Chair Transfer: mod assist for sit to stand, min assist for stand to sit    Bedroom Mobility: Pt tolerated ambulating 3 ft , then 5 ft with min assist with use of RW, cues for safety and much increased time.  Decreased stadning enduranc SPEECH DAILY NOTE - INPATIENT    ASSESSMENT & PLAN   ASSESSMENT  Patient seen to monitor tolerance of PO diet. Trials of current diet presented. Patient fed herself. No clinical signs of dysphagia noted including no clinical signs of aspiration.   Will d Mi Nascimento MA/MYNOR-SLP  Speech Language Pathologist  [address]      Electronically signed by LENIN Post on 1/25/2018  9:15 AM   Attribution Key    EV. 1 - LENIN Post on 1/25/2018  9:11 AM

## (undated) NOTE — MR AVS SNAPSHOT
Mingo Gerber [address] [place] [address]               Thank you for choosing us for your health care visit with PACO Moreira.   We are glad to serve you and happy to provide you develop chest pain, lightheadedness, or significant shortness of breath.     -Please compare your home medications to the medication list attached, call with questions or differences.                       Allergies as of Jan 04, 2017     Liberata Commonly known as:  MIRALAX           Potassium Chloride ER 10 MEQ Tbcr   Take 20 mEq by mouth 2 (two) times daily. Commonly known as:  K-DUR           Pravastatin Sodium 20 MG Tabs   Take 20 mg by mouth nightly.    Commonly known as:  PRAVACHOL Bing.tn

## (undated) NOTE — LETTER
Miesha Feliciano [address], [address]  [address]  INFORMED CONSENT FOR TRANSFUSION OF BLOOD OR BLOOD PRODUCTS  My physician has informed me of the nature, purpose, benefits and risks of transfusion for blood and blood components that ______________________________________________  (Signature of Patient)                                                            (Responsible party in case of Minor,

## (undated) NOTE — LETTER
[address] [place] [address]  Authorization for Invasive Procedures  1.  I hereby authorize Dr       , my physician and whomever may be designated as the doctor's assistant, to perform the following operation and/or procedure: performed for the purposes of advancing medicine, science, and/or education, provided my identity is not revealed. If the procedure has been videotaped, the physician/surgeon will obtain the original videotape.  The hospital will not be responsible for stor My signature below affirms that prior to the time of the procedure, I have explained to the patient and/or her legal representative, the risks and benefits involved in the proposed treatment and any reasonable alternative to the proposed treatment.  I have

## (undated) NOTE — LETTER
[facility]  Administration of Anesthesia  1. Jennifer Horton, or _________________________________ acting on her behalf, (Patient) (Dependent/Representative) request to receive anesthesia for my pending procedure/operation/treatment.   PHILLIP p infections, high spinal block, spinal bleeding, seizure, cardiac arrest and death. 7. AWARENESS: I understand that it is possible (but unlikely) to have explicit memory of events from the operating room while under general anesthesia.   8. ELECTROCONVULSIV unconscious pt /Relationship    My signature below affirms that prior to the time of the procedure, I have explained to the patient and/or his/her guardian, the risks and benefits of undergoing anesthesia, as well as any reasonable alternatives.     _______

## (undated) NOTE — LETTER
[address], [place], [place]     AUTHORIZATION FOR SURGICAL OPERATION OR PROCEDURE    I hereby authorize Dr. Agus Donnelly MD, my Physician(s) and whomever may be designated as the doctor's Assistant, to perform the fol 4. I consent to the photographing of procedure(s) to be performed for the purposes of advancing medicine, science and/or education, provided my identity is not revealed.  If the procedure has been videotaped, the physician/surgeon will obtain the original v (Witness signature)                                                                                                  (Date)                                (Time)  STATEMENT OF PHYSICIAN My signature below affirms that prior to the time of the procedure;  I

## (undated) NOTE — LETTER
[facility]  Administration of Anesthesia  1. Brigido Meraz, or _________________________________ acting on her behalf, (Patient) (Dependent/Representative) request to receive anesthesia for my pending procedure/operation/treatment.   PHILLIP mccrary infections, high spinal block, spinal bleeding, seizure, cardiac arrest and death. 7. AWARENESS: I understand that it is possible (but unlikely) to have explicit memory of events from the operating room while under general anesthesia.   8. ELECTROCONVULSIV unconscious pt /Relationship    My signature below affirms that prior to the time of the procedure, I have explained to the patient and/or his/her guardian, the risks and benefits of undergoing anesthesia, as well as any reasonable alternatives.     _______

## (undated) NOTE — LETTER
[address] [place] [address]  Authorization for Invasive Procedures  1.  I hereby authorize  _______________________ , my physician and whomever may be designated as the doctor's assistant, to perform the following operation and/or proc 5. I consent to the photographing of the operations or procedures to be performed for the purposes of advancing medicine, science, and/or education, provided my identity is not revealed.  If the procedure has been videotaped, the physician/surgeon will obta __________ Time: ___________    Statement of Physician  My signature below affirms that prior to the time of the procedure, I have explained to the patient and/or her legal representative, the risks and benefits involved in the proposed treatment and any r

## (undated) NOTE — LETTER
Miesha Feliciano [address], [place]  [address]  INFORMED CONSENT FOR TRANSFUSION OF BLOOD OR BLOOD PRODUCTS  My physician has informed me of the nature, purpose, benefits and risks of transfusion for blood and blood components that ______________________________________________  (Signature of Patient)                                                            (Responsible party in case of Minor,

## (undated) NOTE — IP AVS SNAPSHOT
[facility]            (For Outpatient Use Only) Initial Admit Date: 1/21/2018   Inpt/Obs Admit Date: Inpt: 1/21/18 / Obs: N/A   Discharge Date:    Hipolito Ponce:  [de-identified]   MRN: [de-identified]   CSN: [id number]        ENCOUNTER  Patient Class Subscriber ID:  Pt Rel to Subscriber:    Hospital Account Financial Class: Medicare    January 26, 2018

## (undated) NOTE — LETTER
[address] [place] [address] Rd, [place], [place]     AUTHORIZATION FOR SURGICAL OPERATION OR PROCEDURE    1.  I hereby authorize Dr. India santos, my Physician(s) and whomever may be designated as the doctor's Assistant, to perform 5. I consent to the photographing of procedure(s) to be performed for the purposes of advancing medicine, science and/or education, provided my identity is not revealed.  If the procedure has been videotaped, the physician/surgeon will obtain the original v (Witness signature)                                                                                                  (Date)                                (Time)  STATEMENT OF PHYSICIAN My signature below affirms that prior to the time of the procedure;  I

## (undated) NOTE — LETTER
[facility]  Administration of Anesthesia  1. Keerthi Mendez, or _________________________________ acting on her behalf, (Patient) (Dependent/Representative) request to receive anesthesia for my pending procedure/operation/treatment.   PHILLIP p infections, high spinal block, spinal bleeding, seizure, cardiac arrest and death. 7. AWARENESS: I understand that it is possible (but unlikely) to have explicit memory of events from the operating room while under general anesthesia.   8. ELECTROCONVULSIV unconscious pt /Relationship    My signature below affirms that prior to the time of the procedure, I have explained to the patient and/or his/her guardian, the risks and benefits of undergoing anesthesia, as well as any reasonable alternatives.     _______

## (undated) NOTE — IP AVS SNAPSHOT
[facility]            (For Outpatient Use Only) Initial Admit Date: 2/7/2018   Inpt/Obs Admit Date: Inpt: 2/7/18 / Obs: N/A   Discharge Date:    John Joseph:  [de-identified]   MRN: [de-identified]   CSN: [id number]        ENCOUNTER  Patient Class: Subscriber Name:  Shiva Norton :    Subscriber ID:  Pt Rel to Subscriber:    Hospital Account Financial Class: Medicare    2018

## (undated) NOTE — LETTER
[address], [place]  Authorization for Invasive Procedures  1.  I hereby authorize Dr. Camila Olson , my physician and whomever may be designated as the doctor's assistant, to perform the following operation and/or procedure:  Esophagoga performed for the purposes of advancing medicine, science, and/or education, provided my identity is not revealed. If the procedure has been videotaped, the physician/surgeon will obtain the original videotape.  The hospital will not be responsible for stor My signature below affirms that prior to the time of the procedure, I have explained to the patient and/or her legal representative, the risks and benefits involved in the proposed treatment and any reasonable alternative to the proposed treatment.  I have

## (undated) NOTE — IP AVS SNAPSHOT
[address]  [address] [phone]                Discharge Summary   3/14/2017    Ms. Roque Ethel           Admission Information        Provider Department    3/14/2017 Guicho Morrow MD [facility] 4w/S INSERT ONE SUPPOSITORY DAILY  AS NEEDED FOR CONSTIPATION        Gentamicin Sulfate 0.3 % Soln   Last time this was given:  1 drop on 3/17/2017  9:31 AM   Commonly known as:  GARAMYCIN   Next dose due:  START THIS EVENING        Place 1 drop into both eyes DilTIAZem HCl ER Coated Beads 240 MG Cp24   Last time this was given:  240 mg on 3/17/2017  9:31 AM   Commonly known as:  CARDIZEM CD   Next dose due:  START IN THE MORNING        Take 240 mg by mouth daily.          9 AM                   Ferrous Sulfate Next dose due:  START THIS EVENING        Take 20 mg by mouth nightly.                  9 PM           rOPINIRole HCl 1 MG Tabs   Last time this was given:  1 mg on 3/16/2017  9:44 PM   Commonly known as:  REQUIP   Next dose due:  START THIS EVENING HEART FAILURE: BEING ACTIVE (ENGLISH)    HEART FAILURE: EVALUATING YOUR HEART (ENGLISH)    HEART FAILURE: MAKING CHANGES TO YOUR DIET (ENGLISH)    HEART FAILURE: MEDICINES TO HELP YOUR HEART (ENGLISH)    HEART FAILURE: TAKING MEDICATION TO CONTROL  (ENGLI Metabolic Lab Results  (Last result in the past 90 days)    ALT Bilirubin,Total Total Protein Albumin Sodium Potassium Chloride    -- -- -- -- (03/16/17)  139 (03/16/17)  3.9 (03/16/17)  95      Radiology Exams     None      Patient Belongings       Most R provide you with additional printed information. Not all patients will experience these side effects or respond to medications the same. Please call your provider or healthcare team if you have any questions regarding your medications while at home. Ferrous Sulfate 325 (65 FE) MG Oral Tab       Use: Increase blood cell counts   Most common side effects: Pain, fever, rash, fatigue, joint pain, blood clots, high blood pressure   What to report to your healthcare team: Pain, swelling, rash, fever Most common side effects:  Dizziness, drowsiness, headache, nausea/vomiting, somnolence   What to report to your healthcare team: Dizziness, Somnolence, Weakness, Headache, Nausea/vomiting           All Other Medications     nystatin [id number] UNIT/GM Externa

## (undated) NOTE — IP AVS SNAPSHOT
Patient Demographics     Address  [address] [place] [address] Phone  [phone] (Home) *Preferred* E-mail Address  [email or web address]      Emergency Contact(s)     Name Relation Home Work [facility] [place] [phone] Take 3 mL by nebulization every 6 (six) hours as needed.    Jamil Rodgers MD         Levothyroxine Sodium 88 MCG Tabs  Commonly known as:  SYNTHROID, LEVOTHROID  Next dose due:  2/13/18 AM      Take 88 mcg by mouth before breakfast.          loratadine 10 Bring a paper prescription for each of these medications  MINERIN Crea  TraMADol HCl 50 MG Tabs           [id number] - MAR ACTION REPORT  (last 24 hrs)    ** SITE UNKNOWN **     Order ID Medication Name Action Time Action Reason Comments    [id number] Cholec URINALYSIS WITH CULTURE REFLEX [[id number]]  Resulted: 02/12/18 0724, Result status: Final result   Ordering provider:  Christin Saenz MD  02/11/18 1173 Resulting lab:  [facility]    Specimen Information    Type Source Collected On   Urine — 02/12/18 0528 Total Protein 5.3 5.9 - 8.4 g/dL [facility]   Albumin 2.7 3.5 - 4.8 g/dL [facility]   Globulin 2.6 2.5 - 3.7 g/dL — [facility]   A/G Ratio 1.0 1.0 - 2.0 — [facility]   Anion Gap 8 0 - 18 mmol/L — [facility]   BUN/CREA Ratio 14.4 10.0 - 20.0 — Author:  Nicole Flynn MD Service:  (none) Author Type:  Physician    Filed:  2/8/2018  8:00 AM Date of Service:  2/8/2018  7:53 AM Status:  Signed    :  Nicole Flynn MD (Physician)       [address] [place] [address] Home Medications:    Prescriptions Prior to Admission:  docusate sodium 100 MG Oral Cap Take 100 mg by mouth 2 (two) times daily.  Disp:  Rfl:  Past Month at Unknown time   ipratropium-albuterol 0.5-2.5 (3) MG/3ML Inhalation Solution Take 3 mL by nebuliza rOPINIRole HCl (REQUIP) 1 MG Oral Tab Take 1 mg by mouth nightly.    Disp:  Rfl:  2/6/2018 at 2100   gabapentin (NEURONTIN) 100 MG Oral Cap TAKE 2 CAPSULES BY MOUTH AT BEDTIME Disp: 60 capsule Rfl: 0 2/6/2018 at 2100   Magnesium Hydroxide (MILK OF MAGNESIA Acute kidney injury ([facility] [address]  Renal function is stable. 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 Will transfuse 2 units PRBC. Discussed with Anu Navaaugusta( patient's son), he agrees with the plan.         Yanely Rose MD mg twice daily since January 30. Last dose of Eliquis was given last night. At the nursing home she was noted to have melena and hemoglobin was drawn and was 5.8. She was therefore sent to the ED were confirmatory hemoglobin was 6.4.   The patient denies Shellfish-Derived P*[MS.2]        Review of Systems:   GENERAL HEALTH: feels well otherwise, denies fever or weight loss  SKIN: denies any unusual skin lesions or rashes  EYES: no visual complaints or deficits  HEENT: denies mouth sores  RESPIRATORY: no sh [facility], X CHEST PA LAT ROUTINE, 9/28/2009, 16:02. [facility], X CHEST PORTABLE, 9/26/2016, 19:21. [facility], X CHEST PA LAT ROUTINE, 9/29/2016, 8:53.   [address] [place] [address] would prefer to wait until tomorrow as long as her vital signs remained stable. Hold Eliquis and give PPI for now. Serial hemoglobins. Thank you for allowing me to participate in the care of your patient. Yaz Martinez MD [facility]    2/7/2018[MS. 1] Hyperinflation. 4. Scarring/atelectasis. 5. Prominent markings at the left base and small pneumonia not excluded. 6. Blunted posterior costophrenic angles. 7. Kyphoscoliosis. 8. Demineralization. 9. Osteoarthritis.  10. Chronic appearing wedging of multiple arteries: PA peak pressure: 37mm Hg (S). 5. Impressions: The right ventricular systolic pressure was increased    consistent with mild pulmonary hypertension.  Compared to the prior study there has been no significant interval change. ---------------------- dilated. Right ventricle: The cavity size was normal. Systolic function was normal. Pulmonic valve: The valve appears to be grossly normal.    Doppler: There was no evidence for stenosis. No regurgitation.  Tricuspid valve:   Structurally normal va veins Estimated CVP                                     10 mm Hg  ------ Right ventricle RV pressure, S                                   *37 mm Hg  <30 Legend: Mean values are shown as u=mean value.  Asterisk (*) marks values outside specified normal range PT Discharge Recommendations: Sub-acute rehabilitation; Cont skilled therapy in a supervised setting[JM.2]    PLAN[JM.1]  PT Treatment Plan: Bed mobility;Gait training;Strengthening;Transfer training;Balance training;Patient education  Rehab Potential : Goo RANGE OF MOTION AND STRENGTH ASSESSMENT  Upper extremity ROM and strength are within functional limits except left shoulder limited from old injury    Lower extremity ROM is within functional limits     Lower extremity strength is within functional limits Patient Goal Patient's self-stated goal is: go back home   Goal #1 Patient is able to demonstrate supine - sit EOB @ level: CGA x 1     Goal #1   Current Status    Goal #2 Patient is able to demonstrate transfers with RW and min - CGA  x1     Goal #2  Curr swallow of hard solid trial due to reduced bolus formation. Residue cleared with sips of thin liquid. Timely oral transit and trigger of pharyngeal swallow observed. Pharyngeal phase appears to be within functional limits.  No overt clinical signs of aspira BSSE (clinical evaluation) including Slow rate, Small bites, Small sips, Multiple swallows, Alternate liquids/solids, No straws, Upright 90 degrees, Eliminate distractions with mild assistance 100 % of the time across 2 sessions.     Pt demonstrated use of

## (undated) NOTE — IP AVS SNAPSHOT
Patient Demographics     Address Phone    [phone] (Home) *Preferred*      Emergency Contact(s)     Name Relation Home Work [facility] Son [phone]      Allergies as of 3/17/2017  Rev Next dose due:  START THIS EVENING   Notes to Patient:  ALERT: take this medication as prescribed, to prevent blood clots and pulmonary embolism. If you are unable to afford-contact your DOCTOR        Take 5 mg by mouth 2 (two) times daily.          9 AM Commonly known as:  SYNTHROID, LEVOTHROID   Next dose due:  START IN THE MORNING        Take 88 mcg by mouth before breakfast.         TAKE PRIOR TO BREAKFAST ON AN EMPTY STOMACH DAILY                   loratadine 10 MG Tabs   Commonly known as:  CLARITIN Next dose due:  START THIS EVENING        Take 1 mg by mouth nightly.                  9 PM           Sennosides-Docusate Sodium 8.6-50 MG Tabs   Commonly known as:  PERICOLACE   Next dose due:  AS NEEDED        Take 2 tablets by mouth nightly as needed for [id number] acetaminophen (TYLENOL) tab 650 mg 03/17/17 0313 Given      [id number] apixaban (ELIQUIS) tab 5 mg 03/16/17 2143 Given      [id number] apixaban (ELIQUIS) tab 5 mg 03/17/17 0931 Given      [id number] aspirin EC tab 81 mg 03/17/17 0931 Given      [address] [facility] Patient Status:  Inpatient    3/1/1925 MRN [id number]   Location [facility] 4W/SW/SE Attending Twyla Mendoza MD   Hosp Day # 0 PCP Laine Schneider MD     Date:  3/14/2017  Date of A furosemide 40 MG Oral Tab Take 40 mg by mouth 2 (two) times daily. Disp:  Rfl:  Taking   Potassium Chloride ER 10 MEQ Oral Tab CR Take 20 mEq by mouth 2 (two) times daily.  Disp:  Rfl:  Taking   Levothyroxine Sodium 88 MCG Oral Tab Take 88 mcg by mouth belilibeth HENT: Negative for mouth sores, sore throat and trouble swallowing. Respiratory: Negative for cough, shortness of breath and wheezing. Cardiovascular: Positive for palpitations and leg swelling. Negative for chest pain.    Gastrointestinal: Positive f Peripheral edema  Continue Lasix. Inability to ambulate due to knee  Start therapy. Diastolic CHF, chronic ([address]  Will ask Cardiology to see. Hypothyroidism  Stable on Levothyroxine.           Bharath Amin MD  3/14/2017  7:56 AM     Electr reports that she quit smoking about 47 years ago. Her smoking use included Cigarettes. She does not have any smokeless tobacco history on file. She reports that she drinks alcohol. She reports that she does not use illicit drugs.     Allergies:    Corn •  PEG 3350 (MIRALAX) powder packet 17 g, 17 g, Oral, Daily  •  bisacodyl (DULCOLAX) rectal suppository 10 mg, 10 mg, Rectal, Daily PRN    Review of Systems:  10 point review of symptoms found to be Non Contributory    Physical Exam:    General: Alert, kaylen Congenital spondylolisthesis     Degeneration of lumbar or lumbosacral intervertebral disc     Myalgia and myositis, unspecified     Lumbago     Carpal tunnel syndrome     Body mass index 30.0-30.9, adult     Other osteoporosis     Sciatica     Chondroc Lumbago    Atrial fibrillation, persistent (HCC)    Accidental fall    Peripheral edema    Inability to ambulate due to knee    Diastolic CHF, chronic (HCC)    Hypothyroidism      ASSESSMENT   Max a for bed mobility,positioning for pt comfort and there e Distance (ft): 4-5 side steps  Assistive Device: Rolling walker  Pattern: Shuffle  Stoop/Curb Assistance: Not tested  Comment : second person required (decreased domenic)    Additional information:     THERAPEUTIC EXERCISES  Lower Extremity   AP,QS,GS,abd/ ASSESSMENT   Max a for bed mobility,transfeer and ambulation. Pt educated on deep breathing,relaxation and energy conservation technique. Cont with PT    DISCHARGE RECOMMENDATIONS  PT Discharge Recommendations: Cont skilled therapy in a supervised settin Comment : second person required (decreased domenic)    Additional information:     THERAPEUTIC EXERCISES  Lower Extremity   AP,QS,GS,abd/add,HS   Position   supine     Patient End of Session: Up in chair;Call light within reach;RN aware of session/finding 2 with max assist with rw, which are below the patient's pre-admission status. Pt with limited amb distance, max assist x 1 for bed mobility. Pt will benefit from rehab stay prior to return home.     Patient will benefit from continued IP PT services to add Prior Level of Thomas: Pt states she has assistance for adl, and meals. Pt indep with amb/transfers with rw household distances. SUBJECTIVE  I feel tired.      PHYSICAL THERAPY EXAMINATION     OBJECTIVE  Precautions: Bed/chair alarm  Fall Risk: PT Approx Degree of Impairment Score: 86.62%   Standardized Score (AM-PAC Scale): 28.58   CMS Modifier (G-Code): CM    FUNCTIONAL ABILITY STATUS  Gait Assessment   Gait Assistance: Maximum assistance (Pt requires assist x 2 for safety, assist with rw, cues Occupational Therapy Notes (last 72 hours) (Notes from 3/14/2017 12:45 PM through 3/17/2017 12:45 PM)      Occupational Therapy Note by Mario Quinn at 3/16/2017 11:16 AM  Version 1 of 1    Author:  Mario Quinn Service:  (none) Author Type:  Salo Don Precautions: Bed/chair alarm    WEIGHT BEARING RESTRICTION  Weight Bearing Restriction: None                PAIN ASSESSMENT  Rating: Unable to rate  Location:  (lupe knees)  Management Techniques: Relaxation;Repositioning     ACTIVITY TOLERANCE  Room air Occupational Therapy Note by Joey Paredes OT at 3/14/2017  3:51 PM  Version 1 of 1    Author:  Joey Paredes OT Service:  (none) Author Type:  Occupational Therapist    Filed:  3/14/2017  4:09 PM Note Time:  3/14/2017  3:51 PM Status:  Signed Past Medical History   Diagnosis Date   • Arthritis    • Unspecified essential hypertension    • Osteoporosis    • Rheumatoid arthritis(714.0)    • Sleep apnea    • Thyroid disease    • Wrist fracture      RT   • Diastolic CHF, chronic (HCC)    • Chronic a -   Putting on and taking off regular lower body clothing?: A Lot  -   Bathing (including washing, rinsing, drying)?: A Lot  -   Toileting, which includes using toilet, bedpan or urinal? : A Lot  -   Putting on and taking off regular upper body clothing?:

## (undated) NOTE — LETTER
[address], [place], [place]     AUTHORIZATION FOR SURGICAL OPERATION OR PROCEDURE    1.  I hereby authorize Dr. Bettina Hayes, my Physician(s) and whomever may be designated as the doctor's Assistant, to perform the following opera 5. I consent to the photographing of procedure(s) to be performed for the purposes of advancing medicine, science and/or education, provided my identity is not revealed.  If the procedure has been videotaped, the physician/surgeon will obtain the original v (Witness signature)                                                                                                  (Date)                                (Time)  STATEMENT OF PHYSICIAN My signature below affirms that prior to the time of the procedure;  I

## (undated) NOTE — IP AVS SNAPSHOT
[facility]            (For Outpatient Use Only) Initial Admit Date: 7/27/2017   Inpt/Obs Admit Date: Inpt: N/A / Obs: 07/27/17   Discharge Date:    Skip Rosanna:  [de-identified]   MRN: [de-identified]   CSN: [id number]        ENCOUNTER  Patient Clas Subscriber ID:  Pt Rel to Subscriber:    Hospital Account Financial Class: Medicare    July 29, 2017

## (undated) NOTE — LETTER
Miesha Feliciano [address], [address]  [address]  INFORMED CONSENT FOR TRANSFUSION OF BLOOD OR BLOOD PRODUCTS  My physician has informed me of the nature, purpose, benefits and risks of transfusion for blood and blood components that ______________________________________________  (Signature of Patient)                                                            (Responsible party in case of Minor,

## (undated) NOTE — ED AVS SNAPSHOT
Ms. Maldonado Age   MRN: [id number]    Department:  [facility] Emergency Department   Date of Visit:  3/31/2018           Disclosure     Insurance plans vary and the physician(s) referred by the ER may not be covered by your plan.  Please contac within the next three months to obtain basic health screening including reassessment of your blood pressure.     IF THERE IS ANY CHANGE OR WORSENING OF YOUR CONDITION, CALL YOUR PRIMARY CARE PHYSICIAN AT ONCE OR RETURN IMMEDIATELY TO THE EMERGENCY DEPARTMEN

## (undated) NOTE — ED AVS SNAPSHOT
Ms. Roque Ethel   MRN: [id number]    Department:  [facility] Emergency Department   Date of Visit:  12/7/2017           Disclosure     Insurance plans vary and the physician(s) referred by the ER may not be covered by your plan.  Please contac within the next three months to obtain basic health screening including reassessment of your blood pressure.     IF THERE IS ANY CHANGE OR WORSENING OF YOUR CONDITION, CALL YOUR PRIMARY CARE PHYSICIAN AT ONCE OR RETURN IMMEDIATELY TO THE EMERGENCY DEPARTMEN

## (undated) NOTE — IP AVS SNAPSHOT
[facility]            (For Outpatient Use Only) Initial Admit Date: 7/29/2017   Inpt/Obs Admit Date: Inpt: N/A / Obs: 07/30/17   Discharge Date:    Shane Vazquez:  [de-identified]   MRN: [de-identified]   CSN: [id number]        ENCOUNTER  Patient Clas Subscriber ID:  Pt Rel to Subscriber:    Hospital Account Financial Class: Medicare    August 1, 2017